# Patient Record
Sex: MALE | Race: WHITE | HISPANIC OR LATINO | Employment: FULL TIME | URBAN - METROPOLITAN AREA
[De-identification: names, ages, dates, MRNs, and addresses within clinical notes are randomized per-mention and may not be internally consistent; named-entity substitution may affect disease eponyms.]

---

## 2018-11-07 ENCOUNTER — NON-PROVIDER VISIT (OUTPATIENT)
Dept: URGENT CARE | Facility: PHYSICIAN GROUP | Age: 53
End: 2018-11-07

## 2018-11-07 DIAGNOSIS — Z02.1 PRE-EMPLOYMENT DRUG SCREENING: ICD-10-CM

## 2018-11-07 LAB
AMP AMPHETAMINE: NORMAL
COC COCAINE: NORMAL
INT CON NEG: NEGATIVE
INT CON POS: POSITIVE
MET METHAMPHETAMINES: NORMAL
OPI OPIATES: NORMAL
PCP PHENCYCLIDINE: NORMAL
POC DRUG COMMENT 753798-OCCUPATIONAL HEALTH: NEGATIVE
THC: NORMAL

## 2018-11-07 PROCEDURE — 80305 DRUG TEST PRSMV DIR OPT OBS: CPT | Performed by: PHYSICIAN ASSISTANT

## 2020-03-09 ENCOUNTER — HOSPITAL ENCOUNTER (EMERGENCY)
Facility: MEDICAL CENTER | Age: 55
End: 2020-03-10
Attending: EMERGENCY MEDICINE
Payer: COMMERCIAL

## 2020-03-09 ENCOUNTER — APPOINTMENT (OUTPATIENT)
Dept: RADIOLOGY | Facility: IMAGING CENTER | Age: 55
End: 2020-03-09
Attending: FAMILY MEDICINE
Payer: COMMERCIAL

## 2020-03-09 ENCOUNTER — OFFICE VISIT (OUTPATIENT)
Dept: URGENT CARE | Facility: CLINIC | Age: 55
End: 2020-03-09
Payer: COMMERCIAL

## 2020-03-09 ENCOUNTER — HOSPITAL ENCOUNTER (OUTPATIENT)
Facility: MEDICAL CENTER | Age: 55
End: 2020-03-09
Attending: FAMILY MEDICINE
Payer: COMMERCIAL

## 2020-03-09 VITALS — OXYGEN SATURATION: 95 % | TEMPERATURE: 104 F | HEART RATE: 120 BPM | RESPIRATION RATE: 15 BRPM

## 2020-03-09 DIAGNOSIS — J18.9 ATYPICAL PNEUMONIA: ICD-10-CM

## 2020-03-09 DIAGNOSIS — R50.9 FEVER AND CHILLS: ICD-10-CM

## 2020-03-09 DIAGNOSIS — E86.0 DEHYDRATION: ICD-10-CM

## 2020-03-09 LAB
APPEARANCE UR: CLEAR
BILIRUB UR STRIP-MCNC: NORMAL MG/DL
COLOR UR AUTO: YELLOW
FLUAV+FLUBV AG SPEC QL IA: NEGATIVE
GLUCOSE UR STRIP.AUTO-MCNC: NORMAL MG/DL
INT CON NEG: NEGATIVE
INT CON NEG: NEGATIVE
INT CON POS: POSITIVE
INT CON POS: POSITIVE
KETONES UR STRIP.AUTO-MCNC: NORMAL MG/DL
LEUKOCYTE ESTERASE UR QL STRIP.AUTO: NORMAL
NITRITE UR QL STRIP.AUTO: NORMAL
PH UR STRIP.AUTO: 5.5 [PH] (ref 5–8)
PROT UR QL STRIP: NORMAL MG/DL
RBC UR QL AUTO: NORMAL
S PYO AG THROAT QL: NEGATIVE
SP GR UR STRIP.AUTO: 1.02
UROBILINOGEN UR STRIP-MCNC: 0.2 MG/DL

## 2020-03-09 PROCEDURE — 87502 INFLUENZA DNA AMP PROBE: CPT

## 2020-03-09 PROCEDURE — 81002 URINALYSIS NONAUTO W/O SCOPE: CPT | Performed by: FAMILY MEDICINE

## 2020-03-09 PROCEDURE — 96374 THER/PROPH/DIAG INJ IV PUSH: CPT

## 2020-03-09 PROCEDURE — 87651 STREP A DNA AMP PROBE: CPT

## 2020-03-09 PROCEDURE — 87486 CHLMYD PNEUM DNA AMP PROBE: CPT

## 2020-03-09 PROCEDURE — 87581 M.PNEUMON DNA AMP PROBE: CPT

## 2020-03-09 PROCEDURE — 87633 RESP VIRUS 12-25 TARGETS: CPT

## 2020-03-09 PROCEDURE — 87804 INFLUENZA ASSAY W/OPTIC: CPT | Performed by: FAMILY MEDICINE

## 2020-03-09 PROCEDURE — 99205 OFFICE O/P NEW HI 60 MIN: CPT | Performed by: FAMILY MEDICINE

## 2020-03-09 PROCEDURE — 87086 URINE CULTURE/COLONY COUNT: CPT

## 2020-03-09 PROCEDURE — 99285 EMERGENCY DEPT VISIT HI MDM: CPT

## 2020-03-09 PROCEDURE — 87880 STREP A ASSAY W/OPTIC: CPT | Performed by: FAMILY MEDICINE

## 2020-03-09 PROCEDURE — 71046 X-RAY EXAM CHEST 2 VIEWS: CPT | Mod: TC | Performed by: FAMILY MEDICINE

## 2020-03-10 ENCOUNTER — HOSPITAL ENCOUNTER (INPATIENT)
Facility: MEDICAL CENTER | Age: 55
LOS: 7 days | DRG: 871 | End: 2020-03-17
Attending: EMERGENCY MEDICINE | Admitting: HOSPITALIST
Payer: COMMERCIAL

## 2020-03-10 ENCOUNTER — APPOINTMENT (OUTPATIENT)
Dept: RADIOLOGY | Facility: MEDICAL CENTER | Age: 55
DRG: 871 | End: 2020-03-10
Attending: EMERGENCY MEDICINE
Payer: COMMERCIAL

## 2020-03-10 ENCOUNTER — APPOINTMENT (OUTPATIENT)
Dept: CARDIOLOGY | Facility: MEDICAL CENTER | Age: 55
DRG: 871 | End: 2020-03-10
Attending: HOSPITALIST
Payer: COMMERCIAL

## 2020-03-10 VITALS
OXYGEN SATURATION: 95 % | WEIGHT: 203 LBS | HEIGHT: 69 IN | SYSTOLIC BLOOD PRESSURE: 132 MMHG | TEMPERATURE: 100.4 F | RESPIRATION RATE: 24 BRPM | HEART RATE: 107 BPM | DIASTOLIC BLOOD PRESSURE: 61 MMHG | BODY MASS INDEX: 30.07 KG/M2

## 2020-03-10 DIAGNOSIS — R50.9 FEVER AND CHILLS: ICD-10-CM

## 2020-03-10 DIAGNOSIS — I35.0 NONRHEUMATIC AORTIC VALVE STENOSIS: ICD-10-CM

## 2020-03-10 DIAGNOSIS — R78.81 BACTEREMIA: ICD-10-CM

## 2020-03-10 DIAGNOSIS — A41.9 SEPSIS WITHOUT ACUTE ORGAN DYSFUNCTION, DUE TO UNSPECIFIED ORGANISM (HCC): ICD-10-CM

## 2020-03-10 PROBLEM — I10 HYPERTENSION: Status: ACTIVE | Noted: 2020-03-10

## 2020-03-10 PROBLEM — K02.9 DENTAL CARIES: Status: ACTIVE | Noted: 2020-03-10

## 2020-03-10 PROBLEM — M10.9 GOUT: Status: ACTIVE | Noted: 2020-03-10

## 2020-03-10 PROBLEM — I33.0 ACUTE BACTERIAL ENDOCARDITIS: Status: ACTIVE | Noted: 2020-03-10

## 2020-03-10 LAB
ALBUMIN SERPL BCP-MCNC: 3.7 G/DL (ref 3.2–4.9)
ALBUMIN SERPL BCP-MCNC: 3.9 G/DL (ref 3.2–4.9)
ALBUMIN/GLOB SERPL: 1 G/DL
ALBUMIN/GLOB SERPL: 1.1 G/DL
ALP SERPL-CCNC: 126 U/L (ref 30–99)
ALP SERPL-CCNC: 131 U/L (ref 30–99)
ALT SERPL-CCNC: 43 U/L (ref 2–50)
ALT SERPL-CCNC: 50 U/L (ref 2–50)
ANION GAP SERPL CALC-SCNC: 9 MMOL/L (ref 0–11.9)
ANION GAP SERPL CALC-SCNC: 9 MMOL/L (ref 0–11.9)
APPEARANCE UR: CLEAR
APPEARANCE UR: CLEAR
AST SERPL-CCNC: 38 U/L (ref 12–45)
AST SERPL-CCNC: 49 U/L (ref 12–45)
BASOPHILS # BLD AUTO: 0.4 % (ref 0–1.8)
BASOPHILS # BLD AUTO: 0.5 % (ref 0–1.8)
BASOPHILS # BLD: 0.05 K/UL (ref 0–0.12)
BASOPHILS # BLD: 0.06 K/UL (ref 0–0.12)
BILIRUB SERPL-MCNC: 0.5 MG/DL (ref 0.1–1.5)
BILIRUB SERPL-MCNC: 0.5 MG/DL (ref 0.1–1.5)
BILIRUB UR QL STRIP.AUTO: NEGATIVE
BILIRUB UR QL STRIP.AUTO: NEGATIVE
BUN SERPL-MCNC: 15 MG/DL (ref 8–22)
BUN SERPL-MCNC: 16 MG/DL (ref 8–22)
C PNEUM DNA SPEC QL NAA+PROBE: NOT DETECTED
CALCIUM SERPL-MCNC: 9.3 MG/DL (ref 8.5–10.5)
CALCIUM SERPL-MCNC: 9.5 MG/DL (ref 8.5–10.5)
CHLORIDE SERPL-SCNC: 107 MMOL/L (ref 96–112)
CHLORIDE SERPL-SCNC: 107 MMOL/L (ref 96–112)
CO2 SERPL-SCNC: 20 MMOL/L (ref 20–33)
CO2 SERPL-SCNC: 21 MMOL/L (ref 20–33)
COLOR UR: YELLOW
COLOR UR: YELLOW
CREAT SERPL-MCNC: 0.83 MG/DL (ref 0.5–1.4)
CREAT SERPL-MCNC: 0.84 MG/DL (ref 0.5–1.4)
EKG IMPRESSION: NORMAL
EOSINOPHIL # BLD AUTO: 0.14 K/UL (ref 0–0.51)
EOSINOPHIL # BLD AUTO: 0.25 K/UL (ref 0–0.51)
EOSINOPHIL NFR BLD: 1.2 % (ref 0–6.9)
EOSINOPHIL NFR BLD: 2.2 % (ref 0–6.9)
ERYTHROCYTE [DISTWIDTH] IN BLOOD BY AUTOMATED COUNT: 40.8 FL (ref 35.9–50)
ERYTHROCYTE [DISTWIDTH] IN BLOOD BY AUTOMATED COUNT: 42 FL (ref 35.9–50)
FLUAV H1 2009 PAND RNA SPEC QL NAA+PROBE: NOT DETECTED
FLUAV H1 RNA SPEC QL NAA+PROBE: NOT DETECTED
FLUAV H3 RNA SPEC QL NAA+PROBE: NOT DETECTED
FLUAV RNA SPEC QL NAA+PROBE: NEGATIVE
FLUAV RNA SPEC QL NAA+PROBE: NOT DETECTED
FLUBV RNA SPEC QL NAA+PROBE: NEGATIVE
FLUBV RNA SPEC QL NAA+PROBE: NOT DETECTED
GLOBULIN SER CALC-MCNC: 3.5 G/DL (ref 1.9–3.5)
GLOBULIN SER CALC-MCNC: 3.6 G/DL (ref 1.9–3.5)
GLUCOSE SERPL-MCNC: 116 MG/DL (ref 65–99)
GLUCOSE SERPL-MCNC: 117 MG/DL (ref 65–99)
GLUCOSE UR STRIP.AUTO-MCNC: NEGATIVE MG/DL
GLUCOSE UR STRIP.AUTO-MCNC: NEGATIVE MG/DL
HADV DNA SPEC QL NAA+PROBE: NOT DETECTED
HCOV RNA SPEC QL NAA+PROBE: NOT DETECTED
HCT VFR BLD AUTO: 35 % (ref 42–52)
HCT VFR BLD AUTO: 36.3 % (ref 42–52)
HGB BLD-MCNC: 12.3 G/DL (ref 14–18)
HGB BLD-MCNC: 12.6 G/DL (ref 14–18)
HMPV RNA SPEC QL NAA+PROBE: NOT DETECTED
HPIV1 RNA SPEC QL NAA+PROBE: NOT DETECTED
HPIV2 RNA SPEC QL NAA+PROBE: NOT DETECTED
HPIV3 RNA SPEC QL NAA+PROBE: NOT DETECTED
HPIV4 RNA SPEC QL NAA+PROBE: NOT DETECTED
IMM GRANULOCYTES # BLD AUTO: 0.04 K/UL (ref 0–0.11)
IMM GRANULOCYTES # BLD AUTO: 0.04 K/UL (ref 0–0.11)
IMM GRANULOCYTES NFR BLD AUTO: 0.3 % (ref 0–0.9)
IMM GRANULOCYTES NFR BLD AUTO: 0.3 % (ref 0–0.9)
INR PPP: 1.13 (ref 0.87–1.13)
KETONES UR STRIP.AUTO-MCNC: NEGATIVE MG/DL
KETONES UR STRIP.AUTO-MCNC: NEGATIVE MG/DL
LACTATE BLD-SCNC: 0.8 MMOL/L (ref 0.5–2)
LACTATE BLD-SCNC: 1.4 MMOL/L (ref 0.5–2)
LEUKOCYTE ESTERASE UR QL STRIP.AUTO: NEGATIVE
LEUKOCYTE ESTERASE UR QL STRIP.AUTO: NEGATIVE
LV EJECT FRACT  99904: 70
LV EJECT FRACT MOD 2C 99903: 63.67
LV EJECT FRACT MOD 4C 99902: 76.87
LV EJECT FRACT MOD BP 99901: 70.56
LYMPHOCYTES # BLD AUTO: 1.66 K/UL (ref 1–4.8)
LYMPHOCYTES # BLD AUTO: 1.71 K/UL (ref 1–4.8)
LYMPHOCYTES NFR BLD: 14.4 % (ref 22–41)
LYMPHOCYTES NFR BLD: 14.8 % (ref 22–41)
M PNEUMO DNA SPEC QL NAA+PROBE: NOT DETECTED
MAGNESIUM SERPL-MCNC: 1.8 MG/DL (ref 1.5–2.5)
MCH RBC QN AUTO: 32.4 PG (ref 27–33)
MCH RBC QN AUTO: 32.8 PG (ref 27–33)
MCHC RBC AUTO-ENTMCNC: 34.7 G/DL (ref 33.7–35.3)
MCHC RBC AUTO-ENTMCNC: 35.1 G/DL (ref 33.7–35.3)
MCV RBC AUTO: 92.1 FL (ref 81.4–97.8)
MCV RBC AUTO: 94.5 FL (ref 81.4–97.8)
MICRO URNS: NORMAL
MICRO URNS: NORMAL
MONOCYTES # BLD AUTO: 1.17 K/UL (ref 0–0.85)
MONOCYTES # BLD AUTO: 1.22 K/UL (ref 0–0.85)
MONOCYTES NFR BLD AUTO: 10.1 % (ref 0–13.4)
MONOCYTES NFR BLD AUTO: 10.6 % (ref 0–13.4)
NEUTROPHILS # BLD AUTO: 8.32 K/UL (ref 1.82–7.42)
NEUTROPHILS # BLD AUTO: 8.46 K/UL (ref 1.82–7.42)
NEUTROPHILS NFR BLD: 72 % (ref 44–72)
NEUTROPHILS NFR BLD: 73.2 % (ref 44–72)
NITRITE UR QL STRIP.AUTO: NEGATIVE
NITRITE UR QL STRIP.AUTO: NEGATIVE
NRBC # BLD AUTO: 0 K/UL
NRBC # BLD AUTO: 0 K/UL
NRBC BLD-RTO: 0 /100 WBC
NRBC BLD-RTO: 0 /100 WBC
PH UR STRIP.AUTO: 5 [PH] (ref 5–8)
PH UR STRIP.AUTO: 7 [PH] (ref 5–8)
PLATELET # BLD AUTO: 234 K/UL (ref 164–446)
PLATELET # BLD AUTO: 246 K/UL (ref 164–446)
PMV BLD AUTO: 9.3 FL (ref 9–12.9)
PMV BLD AUTO: 9.4 FL (ref 9–12.9)
POTASSIUM SERPL-SCNC: 4.2 MMOL/L (ref 3.6–5.5)
POTASSIUM SERPL-SCNC: 4.4 MMOL/L (ref 3.6–5.5)
PROT SERPL-MCNC: 7.3 G/DL (ref 6–8.2)
PROT SERPL-MCNC: 7.4 G/DL (ref 6–8.2)
PROT UR QL STRIP: NEGATIVE MG/DL
PROT UR QL STRIP: NEGATIVE MG/DL
PROTHROMBIN TIME: 14.7 SEC (ref 12–14.6)
RBC # BLD AUTO: 3.8 M/UL (ref 4.7–6.1)
RBC # BLD AUTO: 3.84 M/UL (ref 4.7–6.1)
RBC UR QL AUTO: NEGATIVE
RBC UR QL AUTO: NEGATIVE
RSV A RNA SPEC QL NAA+PROBE: NOT DETECTED
RSV B RNA SPEC QL NAA+PROBE: NOT DETECTED
RV+EV RNA SPEC QL NAA+PROBE: NOT DETECTED
S PYO DNA SPEC NAA+PROBE: NOT DETECTED
SODIUM SERPL-SCNC: 136 MMOL/L (ref 135–145)
SODIUM SERPL-SCNC: 137 MMOL/L (ref 135–145)
SP GR UR STRIP.AUTO: 1.01
SP GR UR STRIP.AUTO: 1.01
TROPONIN T SERPL-MCNC: 16 NG/L (ref 6–19)
UROBILINOGEN UR STRIP.AUTO-MCNC: 0.2 MG/DL
UROBILINOGEN UR STRIP.AUTO-MCNC: 1 MG/DL
WBC # BLD AUTO: 11.6 K/UL (ref 4.8–10.8)
WBC # BLD AUTO: 11.6 K/UL (ref 4.8–10.8)

## 2020-03-10 PROCEDURE — 85025 COMPLETE CBC W/AUTO DIFF WBC: CPT

## 2020-03-10 PROCEDURE — 700111 HCHG RX REV CODE 636 W/ 250 OVERRIDE (IP): Performed by: EMERGENCY MEDICINE

## 2020-03-10 PROCEDURE — 93005 ELECTROCARDIOGRAM TRACING: CPT

## 2020-03-10 PROCEDURE — 84484 ASSAY OF TROPONIN QUANT: CPT

## 2020-03-10 PROCEDURE — 93005 ELECTROCARDIOGRAM TRACING: CPT | Performed by: EMERGENCY MEDICINE

## 2020-03-10 PROCEDURE — 700102 HCHG RX REV CODE 250 W/ 637 OVERRIDE(OP): Performed by: EMERGENCY MEDICINE

## 2020-03-10 PROCEDURE — 93306 TTE W/DOPPLER COMPLETE: CPT | Mod: 26 | Performed by: INTERNAL MEDICINE

## 2020-03-10 PROCEDURE — 700105 HCHG RX REV CODE 258: Performed by: EMERGENCY MEDICINE

## 2020-03-10 PROCEDURE — 87040 BLOOD CULTURE FOR BACTERIA: CPT | Mod: 91

## 2020-03-10 PROCEDURE — 80053 COMPREHEN METABOLIC PANEL: CPT

## 2020-03-10 PROCEDURE — 81003 URINALYSIS AUTO W/O SCOPE: CPT

## 2020-03-10 PROCEDURE — 87181 SC STD AGAR DILUTION PER AGT: CPT

## 2020-03-10 PROCEDURE — 85610 PROTHROMBIN TIME: CPT

## 2020-03-10 PROCEDURE — 80053 COMPREHEN METABOLIC PANEL: CPT | Mod: 91

## 2020-03-10 PROCEDURE — 87040 BLOOD CULTURE FOR BACTERIA: CPT

## 2020-03-10 PROCEDURE — 81003 URINALYSIS AUTO W/O SCOPE: CPT | Mod: 91

## 2020-03-10 PROCEDURE — 770020 HCHG ROOM/CARE - TELE (206)

## 2020-03-10 PROCEDURE — 83605 ASSAY OF LACTIC ACID: CPT

## 2020-03-10 PROCEDURE — 83605 ASSAY OF LACTIC ACID: CPT | Mod: 91

## 2020-03-10 PROCEDURE — 83735 ASSAY OF MAGNESIUM: CPT

## 2020-03-10 PROCEDURE — 93306 TTE W/DOPPLER COMPLETE: CPT

## 2020-03-10 PROCEDURE — 85025 COMPLETE CBC W/AUTO DIFF WBC: CPT | Mod: 91

## 2020-03-10 PROCEDURE — A9270 NON-COVERED ITEM OR SERVICE: HCPCS | Performed by: EMERGENCY MEDICINE

## 2020-03-10 PROCEDURE — 71045 X-RAY EXAM CHEST 1 VIEW: CPT

## 2020-03-10 PROCEDURE — 99223 1ST HOSP IP/OBS HIGH 75: CPT | Performed by: HOSPITALIST

## 2020-03-10 PROCEDURE — 87077 CULTURE AEROBIC IDENTIFY: CPT

## 2020-03-10 PROCEDURE — 87086 URINE CULTURE/COLONY COUNT: CPT

## 2020-03-10 PROCEDURE — 96365 THER/PROPH/DIAG IV INF INIT: CPT

## 2020-03-10 PROCEDURE — 99285 EMERGENCY DEPT VISIT HI MDM: CPT

## 2020-03-10 RX ORDER — POLYETHYLENE GLYCOL 3350 17 G/17G
1 POWDER, FOR SOLUTION ORAL
Status: DISCONTINUED | OUTPATIENT
Start: 2020-03-10 | End: 2020-03-17 | Stop reason: HOSPADM

## 2020-03-10 RX ORDER — LISINOPRIL 10 MG/1
10 TABLET ORAL
Status: DISCONTINUED | OUTPATIENT
Start: 2020-03-11 | End: 2020-03-15

## 2020-03-10 RX ORDER — PROMETHAZINE HYDROCHLORIDE 25 MG/1
12.5-25 SUPPOSITORY RECTAL EVERY 4 HOURS PRN
Status: DISCONTINUED | OUTPATIENT
Start: 2020-03-10 | End: 2020-03-17 | Stop reason: HOSPADM

## 2020-03-10 RX ORDER — SODIUM CHLORIDE, SODIUM LACTATE, POTASSIUM CHLORIDE, AND CALCIUM CHLORIDE .6; .31; .03; .02 G/100ML; G/100ML; G/100ML; G/100ML
30 INJECTION, SOLUTION INTRAVENOUS
Status: DISCONTINUED | OUTPATIENT
Start: 2020-03-10 | End: 2020-03-17 | Stop reason: HOSPADM

## 2020-03-10 RX ORDER — OXYCODONE HYDROCHLORIDE 5 MG/1
5 TABLET ORAL
Status: DISCONTINUED | OUTPATIENT
Start: 2020-03-10 | End: 2020-03-13

## 2020-03-10 RX ORDER — PROCHLORPERAZINE EDISYLATE 5 MG/ML
5-10 INJECTION INTRAMUSCULAR; INTRAVENOUS EVERY 4 HOURS PRN
Status: DISCONTINUED | OUTPATIENT
Start: 2020-03-10 | End: 2020-03-17 | Stop reason: HOSPADM

## 2020-03-10 RX ORDER — OXYCODONE HYDROCHLORIDE 10 MG/1
10 TABLET ORAL
Status: DISCONTINUED | OUTPATIENT
Start: 2020-03-10 | End: 2020-03-13

## 2020-03-10 RX ORDER — ACETAMINOPHEN 500 MG
1000 TABLET ORAL EVERY 6 HOURS PRN
Status: DISCONTINUED | OUTPATIENT
Start: 2020-03-10 | End: 2020-03-17 | Stop reason: HOSPADM

## 2020-03-10 RX ORDER — AZITHROMYCIN 500 MG/1
250 TABLET, FILM COATED ORAL DAILY
Qty: 4 TAB | Refills: 0 | Status: ON HOLD | OUTPATIENT
Start: 2020-03-10 | End: 2020-03-16

## 2020-03-10 RX ORDER — ONDANSETRON 4 MG/1
4 TABLET, ORALLY DISINTEGRATING ORAL EVERY 4 HOURS PRN
Status: DISCONTINUED | OUTPATIENT
Start: 2020-03-10 | End: 2020-03-17 | Stop reason: HOSPADM

## 2020-03-10 RX ORDER — ONDANSETRON 2 MG/ML
4 INJECTION INTRAMUSCULAR; INTRAVENOUS EVERY 4 HOURS PRN
Status: DISCONTINUED | OUTPATIENT
Start: 2020-03-10 | End: 2020-03-17 | Stop reason: HOSPADM

## 2020-03-10 RX ORDER — AMOXICILLIN 250 MG
2 CAPSULE ORAL 2 TIMES DAILY
Status: DISCONTINUED | OUTPATIENT
Start: 2020-03-10 | End: 2020-03-17 | Stop reason: HOSPADM

## 2020-03-10 RX ORDER — SODIUM CHLORIDE, SODIUM LACTATE, POTASSIUM CHLORIDE, CALCIUM CHLORIDE 600; 310; 30; 20 MG/100ML; MG/100ML; MG/100ML; MG/100ML
INJECTION, SOLUTION INTRAVENOUS CONTINUOUS
Status: DISCONTINUED | OUTPATIENT
Start: 2020-03-10 | End: 2020-03-13

## 2020-03-10 RX ORDER — SODIUM CHLORIDE 9 MG/ML
1000 INJECTION, SOLUTION INTRAVENOUS ONCE
Status: COMPLETED | OUTPATIENT
Start: 2020-03-10 | End: 2020-03-10

## 2020-03-10 RX ORDER — LISINOPRIL 10 MG/1
10 TABLET ORAL
Status: ON HOLD | COMMUNITY
Start: 2020-01-22 | End: 2020-03-16

## 2020-03-10 RX ORDER — ALLOPURINOL 100 MG/1
200 TABLET ORAL 2 TIMES DAILY
COMMUNITY

## 2020-03-10 RX ORDER — HYDROMORPHONE HYDROCHLORIDE 1 MG/ML
0.5 INJECTION, SOLUTION INTRAMUSCULAR; INTRAVENOUS; SUBCUTANEOUS
Status: DISCONTINUED | OUTPATIENT
Start: 2020-03-10 | End: 2020-03-10 | Stop reason: HOSPADM

## 2020-03-10 RX ORDER — CHLORAL HYDRATE 500 MG
1000 CAPSULE ORAL DAILY
Status: ON HOLD | COMMUNITY
End: 2020-05-07

## 2020-03-10 RX ORDER — SODIUM CHLORIDE, SODIUM LACTATE, POTASSIUM CHLORIDE, AND CALCIUM CHLORIDE .6; .31; .03; .02 G/100ML; G/100ML; G/100ML; G/100ML
1000 INJECTION, SOLUTION INTRAVENOUS
Status: DISCONTINUED | OUTPATIENT
Start: 2020-03-10 | End: 2020-03-17 | Stop reason: HOSPADM

## 2020-03-10 RX ORDER — PROMETHAZINE HYDROCHLORIDE 25 MG/1
12.5-25 TABLET ORAL EVERY 4 HOURS PRN
Status: DISCONTINUED | OUTPATIENT
Start: 2020-03-10 | End: 2020-03-17 | Stop reason: HOSPADM

## 2020-03-10 RX ORDER — BISACODYL 10 MG
10 SUPPOSITORY, RECTAL RECTAL
Status: DISCONTINUED | OUTPATIENT
Start: 2020-03-10 | End: 2020-03-17 | Stop reason: HOSPADM

## 2020-03-10 RX ORDER — ALLOPURINOL 100 MG/1
200 TABLET ORAL 2 TIMES DAILY
Status: DISCONTINUED | OUTPATIENT
Start: 2020-03-10 | End: 2020-03-17 | Stop reason: HOSPADM

## 2020-03-10 RX ORDER — AZITHROMYCIN 250 MG/1
500 TABLET, FILM COATED ORAL ONCE
Status: COMPLETED | OUTPATIENT
Start: 2020-03-10 | End: 2020-03-10

## 2020-03-10 RX ORDER — HYDROMORPHONE HYDROCHLORIDE 1 MG/ML
0.5 INJECTION, SOLUTION INTRAMUSCULAR; INTRAVENOUS; SUBCUTANEOUS
Status: DISCONTINUED | OUTPATIENT
Start: 2020-03-10 | End: 2020-03-13

## 2020-03-10 RX ORDER — IBUPROFEN 200 MG
800 TABLET ORAL
Status: ON HOLD | COMMUNITY
End: 2020-05-14

## 2020-03-10 RX ADMIN — AZITHROMYCIN MONOHYDRATE 500 MG: 250 TABLET ORAL at 03:09

## 2020-03-10 RX ADMIN — SODIUM CHLORIDE 1000 ML: 9 INJECTION, SOLUTION INTRAVENOUS at 00:59

## 2020-03-10 RX ADMIN — HYDROMORPHONE HYDROCHLORIDE 0.5 MG: 1 INJECTION, SOLUTION INTRAMUSCULAR; INTRAVENOUS; SUBCUTANEOUS at 00:59

## 2020-03-10 RX ADMIN — CEFTRIAXONE SODIUM 2 G: 2 INJECTION, POWDER, FOR SOLUTION INTRAMUSCULAR; INTRAVENOUS at 19:18

## 2020-03-10 SDOH — HEALTH STABILITY: MENTAL HEALTH: HOW OFTEN DO YOU HAVE A DRINK CONTAINING ALCOHOL?: NEVER

## 2020-03-10 ASSESSMENT — ENCOUNTER SYMPTOMS
MYALGIAS: 0
BRUISES/BLEEDS EASILY: 0
CHILLS: 1
NAUSEA: 0
DEPRESSION: 0
COUGH: 0
DIZZINESS: 0
NERVOUS/ANXIOUS: 0
TINGLING: 0
GASTROINTESTINAL NEGATIVE: 1
WEAKNESS: 0
SENSORY CHANGE: 0
SHORTNESS OF BREATH: 0
RESPIRATORY NEGATIVE: 1
CARDIOVASCULAR NEGATIVE: 1
WEIGHT LOSS: 0
PSYCHIATRIC NEGATIVE: 1
VOMITING: 0
LOSS OF CONSCIOUSNESS: 0
FEVER: 1
BACK PAIN: 1
FLANK PAIN: 0
ABDOMINAL PAIN: 0
NEUROLOGICAL NEGATIVE: 1

## 2020-03-10 ASSESSMENT — PATIENT HEALTH QUESTIONNAIRE - PHQ9
1. LITTLE INTEREST OR PLEASURE IN DOING THINGS: NOT AT ALL
2. FEELING DOWN, DEPRESSED, IRRITABLE, OR HOPELESS: NOT AT ALL
SUM OF ALL RESPONSES TO PHQ9 QUESTIONS 1 AND 2: 0

## 2020-03-10 ASSESSMENT — PAIN DESCRIPTION - DESCRIPTORS: DESCRIPTORS: PRESSURE

## 2020-03-10 ASSESSMENT — FIBROSIS 4 INDEX
FIB4 SCORE: 1.31
FIB4 SCORE: 1.49

## 2020-03-10 NOTE — ED NOTES
Pt resting in bed.  Pt appears comfortable in room.  No visible signs or symptoms of distress noted at this time.  Pt denies needs or concerns at this time.   Rounding ongoing.   PT given a hospital phone to call his wife. Pt does not have cell reception in room

## 2020-03-10 NOTE — ED TRIAGE NOTES
"Chief Complaint   Patient presents with   • Flu Like Symptoms    /79   Pulse (!) 121   Temp (!) 39.4 °C (103 °F) (Oral)   Resp (!) 24   Ht 1.753 m (5' 9\")   Wt 92.1 kg (203 lb)   SpO2 92%     Pt is a transfer from urgent care. Pt p[resents w flu -like symptoms. Pt had recent travel to [facility]. Pt anxious and aggravated in triage. Pt alert and oriented.    "

## 2020-03-10 NOTE — DISCHARGE INSTRUCTIONS
Please go to the CDC website regarding the COVID19 virus home isolation protocol.  Return if your symptoms worsen or change in any way.

## 2020-03-10 NOTE — ED NOTES
Assist RN: Spoke with pt's wife in lobby regarding no visitor policy per [facility] and ER Manager.  Provided with department contact information.

## 2020-03-10 NOTE — PROGRESS NOTES
CC:  cough        ** 2/21-23 - was in [place], [place]         he c/o cough, intermittent fevers and chills, joint pains, myalgias, fatigue and slight cough x 4 wks.   States that the joint and muscle pains are better with motrin.   He also feels that he has been voiding more frequently over past couple of weeks      Pertinent negatives include no   headaches, nausea, vomiting, diarrhea,  , weight loss or wheezing. Nothing aggravates the symptoms.   There is no history of asthma.        Past Medical History:   Diagnosis Date   • Hyperlipidemia    • Hypertension      Family history was reviewed and not pertinent           Social History     Tobacco Use   • Smoking status: Never Smoker   Substance Use Topics   • Alcohol use: Not on file   • Drug use: Not on file         Current Outpatient Medications on File Prior to Visit   Medication Sig Dispense Refill   • diphenoxylate-atropine (LOMOTIL) 2.5-0.025 MG TABS Take 2 Tabs by mouth 4 times a day as needed for Diarrhea. 30 Tab 0   • ranitidine (ZANTAC) 300 MG tablet Take 1 Tab by mouth every day. 30 Each 0   • promethazine-codeine (PHENERGAN-CODEINE) 6.25-10 MG/5ML SYRP Take 5 mL by mouth 3 times a day as needed for Cough (take with food). 120 mL 0     No current facility-administered medications on file prior to visit.                 Review of Systems   Constitutional: + for fever, chills and malaise/fatigue.   Eyes: Negative for vision changes, d/c.    Respiratory: + for cough .   Denies sputum production.    Cardiovascular: Negative for chest pain and palpitations.   Gastrointestinal: Negative for nausea, vomiting, abdominal pain, diarrhea and constipation.   Genitourinary: Negative for dysuria.   +  urgency and frequency.   Skin: Negative for rash or  itching.   Neurological: Negative for dizziness and tingling.    Psychiatric/Behavioral: Negative for depression.   Hematologic/lymphatic - denies bruising or excessive bleeding  All other systems reviewed and are  negative.             Objective:      Pulse (!) 120   Temp (!) 40 °C (104 °F)   Resp 15   SpO2 95%     Physical Exam   Constitutional: patient is oriented to person, place, and time. Patient appears well-developed and well-nourished. No distress.   HENT:   Head: Normocephalic and atraumatic.   Right Ear: External ear normal.   Left Ear: External ear normal.   Nose: Mucosal edema  present. Right sinus exhibits no maxillary sinus tenderness. Left sinus exhibits no maxillary sinus tenderness.   Mouth/Throat: Mucous membranes are normal. No oral lesions.  No posterior pharyngeal erythema.  No oropharyngeal exudate or posterior oropharyngeal edema.   Eyes: Conjunctivae and EOM are normal. Pupils are equal, round, and reactive to light. Right eye exhibits no discharge. Left eye exhibits no discharge. No scleral icterus.   Neck: Normal range of motion. Neck supple. No tracheal deviation present.   Cardiovascular: Normal rate, regular rhythm and normal heart sounds.  Exam reveals no friction rub.    Pulmonary/Chest: Effort normal. No respiratory distress. Patient has no wheezes or rhonchi. Patient has no rales.    Musculoskeletal:  exhibits no edema.   Lymphadenopathy:     Patient has no cervical adenopathy.      Neurological: patient is alert and oriented to person, place, and time.   Skin: Skin is warm and dry. No rash noted. No erythema.   Psychiatric: patient  has a normal mood and affect.  behavior is normal.   Nursing note and vitals reviewed.         Reading Physician Reading Date Result Priority   Jackie Richardson M.D.  [phone] 3/9/2020 Urgent Care      Narrative & Impression       3/9/2020 10:57 PM     HISTORY/REASON FOR EXAM: COUGH; Cough     TECHNIQUE/EXAM DESCRIPTION:  PA and lateral views of the chest.     COMPARISON: None     FINDINGS:     The cardiac silhouette appears within normal limits.     The mediastinal contour appears within normal limits.  The central pulmonary vasculature appears  normal.     The lungs appear well expanded bilaterally.  Bilateral lungs are clear.     No significant pleural effusions are identified.     The bony structures appear age-appropriate.     IMPRESSION:        1.  No acute cardiopulmonary disease.     No results found for: POCCOLOR, POCAPPEAR, POCLEUKEST, POCNITRITE, POCUROBILIGE, POCPROTEIN, POCURPH, POCBLOOD, POCSPGRV, POCKETONES, POCBILIRUBIN, POCGLUCUA      Chest x-ray was personally interpreted and reviewed. No acute cardiopulmonary findings. No pulmonary infiltrates or densities. Cardiac silhouette is normal. No hemidiaphragm elevation. No bony abnormalities.    Assessment/Plan:        1. Fever and chills    cxr was unremarkable.     Rapid strep, influenza were negative.         UA was unremarkable and was also sent for culture      Temp 104, temporally and was given 500mg tylenol    He will require higher level of care.  Differential is broad at this point.    will go to ED via private vehicle.     Currently PUI for COVID-19 - reported to [facility]    Report given to ERP at [facility]    - DX-CHEST-2 VIEWS; Future  - Influenza A/B By PCR (Adult - Flu Only); Future  - Group A Strep by PCR; Future  - POCT Rapid Strep A  - POCT Influenza A/B  - POCT Urinalysis  - URINE CULTURE(NEW); Future

## 2020-03-10 NOTE — ED NOTES
Break RN: antibiotic given. Patient discharged with prescription and instruction. [facility] will contact him tomorrow/ [facility] phone number provided. Mask applied prior to leave the room.

## 2020-03-10 NOTE — ED NOTES
Patient given a urinal to obtain a Urine Specimen. Pt was given instruction on proper collection- Pt verbalized understanding.   UA collected and sent to lab per protocol

## 2020-03-10 NOTE — ED PROVIDER NOTES
ED Provider Note    CHIEF COMPLAINT  Chief Complaint   Patient presents with   • Flu Like Symptoms       HPI  Sanjeev Malagon is a 54 y.o. male who presents for evaluation of fever which has been present for several weeks and associated with waxing and waning body aches, cough which has been dry and nonproductive, but no shortness of breath or difficulty breathing.  Patient notes that he has had intermittent flank pain as well and urinary frequency with no dysuria or hematuria.  He notes he has been taking anywhere from 9 to 12 tablets of 200 mg Motrin per day for at least the past week to control the symptoms.  He has not had any hemoptysis, sore throat, or runny nose.  Patient notes he has been having diffuse muscle and joint aches and also notes that he has gout.  He has no active flareups at this time however.    REVIEW OF SYSTEMS  Constitutional: Fevers, chills, fatigue  Skin: No rashes abrasions, lacerations, or pruritus  HEENT: No ear pain, ringing in ears, or decreased hearing. No sore throat, runny nose, sores, trouble swallowing, trouble speaking.  Neck: No neck pain, stiffness, or masses.  Chest: No pain or rashes  Pulm: No shortness of breath, wheezing, stridor, or pain with inspiration/expiration  Gastrointestinal: No nausea, vomiting, diarrhea, constipation, bloating, melena, hematochezia or pain.  Genitourinary: No dysuria or hematuria  Musculoskeletal: No recent trauma,swelling, weakness.  Diffuse muscle and joint aches.    Neurologic: No sensory or motor changes. No confusion or disorientation.  Heme: No bleeding or bruising problems.   Immuno: No hx of recurrent infections      PAST MEDICAL HISTORY   has a past medical history of Hyperlipidemia and Hypertension.    SOCIAL HISTORY  Social History     Tobacco Use   • Smoking status: Never Smoker   • Smokeless tobacco: Never Used   Substance and Sexual Activity   • Alcohol use: Never     Frequency: Never   • Drug use: Never   • Sexual activity: Not on  "file       SURGICAL HISTORY  patient denies any surgical history    CURRENT MEDICATIONS  Home Medications     Reviewed by Keny Reynolds R.N. (Registered Nurse) on 03/10/20 at 0001  Med List Status: Partial   Medication Last Dose Status   diphenoxylate-atropine (LOMOTIL) 2.5-0.025 MG TABS  Active   promethazine-codeine (PHENERGAN-CODEINE) 6.25-10 MG/5ML SYRP  Active   ranitidine (ZANTAC) 300 MG tablet  Active                ALLERGIES  No Known Allergies    PHYSICAL EXAM  VITAL SIGNS: /79   Pulse (!) 121   Temp (!) 39.4 °C (103 °F) (Oral)   Resp (!) 24   Ht 1.753 m (5' 9\")   Wt 92.1 kg (203 lb)   SpO2 92%   BMI 29.98 kg/m²    Gen: Alert in no apparent distress.  Calm, conversant  HEENT: No signs of trauma, Bilateral external ears normal, Nose normal. Conjunctiva normal, Non-icteric.   Neck:  No tenderness, Supple, No masses  Lymphatic: No cervical lymphadenopathy noted.   Cardiovascular: Mildly tachycardic, regular rhythm.  Prominent systolic murmur.  Capillary refill less than 3 seconds all extremities, 2+ distal pulses  Thorax & Lungs: Normal breath sounds, No respiratory distress, No wheezing bilateral chest rise  Abdomen: Bowel sounds normal, Soft, No tenderness, No masses, No pulsatile masses. No Guarding or rebound  Skin: Warm, Dry, No erythema, No rash.   Extremities: Intact distal pulses, No edema  Neurologic: Alert , no facial droop, grossly normal coordination and strength  Psychiatric: Affect normal, Judgment normal, Mood normal.     LABS  Results for orders placed or performed during the hospital encounter of 03/09/20   CBC WITH DIFFERENTIAL   Result Value Ref Range    WBC 11.6 (H) 4.8 - 10.8 K/uL    RBC 3.84 (L) 4.70 - 6.10 M/uL    Hemoglobin 12.6 (L) 14.0 - 18.0 g/dL    Hematocrit 36.3 (L) 42.0 - 52.0 %    MCV 94.5 81.4 - 97.8 fL    MCH 32.8 27.0 - 33.0 pg    MCHC 34.7 33.7 - 35.3 g/dL    RDW 42.0 35.9 - 50.0 fL    Platelet Count 234 164 - 446 K/uL    MPV 9.4 9.0 - 12.9 fL    " Neutrophils-Polys 73.20 (H) 44.00 - 72.00 %    Lymphocytes 14.80 (L) 22.00 - 41.00 %    Monocytes 10.10 0.00 - 13.40 %    Eosinophils 1.20 0.00 - 6.90 %    Basophils 0.40 0.00 - 1.80 %    Immature Granulocytes 0.30 0.00 - 0.90 %    Nucleated RBC 0.00 /100 WBC    Neutrophils (Absolute) 8.46 (H) 1.82 - 7.42 K/uL    Lymphs (Absolute) 1.71 1.00 - 4.80 K/uL    Monos (Absolute) 1.17 (H) 0.00 - 0.85 K/uL    Eos (Absolute) 0.14 0.00 - 0.51 K/uL    Baso (Absolute) 0.05 0.00 - 0.12 K/uL    Immature Granulocytes (abs) 0.04 0.00 - 0.11 K/uL    NRBC (Absolute) 0.00 K/uL   COMP METABOLIC PANEL   Result Value Ref Range    Sodium 136 135 - 145 mmol/L    Potassium 4.2 3.6 - 5.5 mmol/L    Chloride 107 96 - 112 mmol/L    Co2 20 20 - 33 mmol/L    Anion Gap 9.0 0.0 - 11.9    Glucose 117 (H) 65 - 99 mg/dL    Bun 16 8 - 22 mg/dL    Creatinine 0.84 0.50 - 1.40 mg/dL    Calcium 9.3 8.5 - 10.5 mg/dL    AST(SGOT) 38 12 - 45 U/L    ALT(SGPT) 43 2 - 50 U/L    Alkaline Phosphatase 126 (H) 30 - 99 U/L    Total Bilirubin 0.5 0.1 - 1.5 mg/dL    Albumin 3.7 3.2 - 4.9 g/dL    Total Protein 7.3 6.0 - 8.2 g/dL    Globulin 3.6 (H) 1.9 - 3.5 g/dL    A-G Ratio 1.0 g/dL   LACTIC ACID   Result Value Ref Range    Lactic Acid 0.8 0.5 - 2.0 mmol/L   MAGNESIUM   Result Value Ref Range    Magnesium 1.8 1.5 - 2.5 mg/dL   ESTIMATED GFR   Result Value Ref Range    GFR If African American >60 >60 mL/min/1.73 m 2    GFR If Non African American >60 >60 mL/min/1.73 m 2       RADIOLOGY  No orders to display     In [place] 2/21-2/23      HYDRATION: Based on the patient's presentation of Dehydration the patient was given IV fluids. IV Hydration was used because oral hydration was not adequate alone. Upon recheck following hydration, the patient was stable, improved.    COURSE & MEDICAL DECISION MAKING  Patient arrives from urgent care for possible exposure to COVID19 virus and the possible need for inpatient treatment.  Patient arrives somewhat tachycardic and febrile.   He otherwise has no respiratory symptoms and appears nontoxic.  I feel he is likely dehydrated from the fever although it is notable he has had the fever for almost a month.  This is curious as most viral processes do not last this long.  It is possible he is been having back to back infective processes.  It was notable that patient was in [place] February 21-23 playing softball.  Will initiate laboratory evaluation however it is already notable that the influenza and urinalysis are normal.  He does not have any clinical signs of strep pharyngitis.  I suspect that if the patient's tachycardia resolves to an acceptable degree and he does not experience any deterioration in his hemodynamics or respiratory status he will be dischargeable with watchful waiting and follow-up with the [facility] per protocol.    2:27 AM  Patient in no distress, heart rate 101, regular rhythm.  He has no chest pain or shortness of breath.  States understanding of the findings however I feel that as the cough is been present for the better part of a month, he may be suffering from an atypical type of pneumonia and I feel empiric treatment with azithromycin is reasonable while awaiting follow-up with the [facility] department as an outpatient.  I do not feel he requires inpatient admission at this point despite his mild tachycardia.  I suspect that his tachycardia is related to dehydration and the fact that he is febrile.  I do not suspect early or impending sepsis/cardiorespiratory failure.  Patient has no comorbid respiratory conditions and is otherwise healthy.  Per the CDC guidelines I feel he is safe for outpatient self-isolation until the health department follows up with him.  He will return if his symptoms worsen or change in any way.  In the meantime I feel it is reasonable to continue empiric azithromycin for community-acquired atypical pneumonia such as chlamydia and mycoplasma pneumonias.  Patient is understanding  of this and will be directed to the CDC website for guidelines of home care.  Of note, I did not order respiratory viral panel as I did not feel  as an outpatient.    FINAL IMPRESSION  1. Atypical pneumonia    2. Dehydration        Electronically signed by: Jose G Willoughby M.D., 3/10/2020 12:31 AM

## 2020-03-11 ENCOUNTER — APPOINTMENT (OUTPATIENT)
Dept: RADIOLOGY | Facility: MEDICAL CENTER | Age: 55
DRG: 871 | End: 2020-03-11
Attending: HOSPITALIST
Payer: COMMERCIAL

## 2020-03-11 LAB
ANION GAP SERPL CALC-SCNC: 7 MMOL/L (ref 0–11.9)
BASOPHILS # BLD AUTO: 0.3 % (ref 0–1.8)
BASOPHILS # BLD: 0.03 K/UL (ref 0–0.12)
BUN SERPL-MCNC: 12 MG/DL (ref 8–22)
CALCIUM SERPL-MCNC: 9.1 MG/DL (ref 8.5–10.5)
CHLORIDE SERPL-SCNC: 103 MMOL/L (ref 96–112)
CO2 SERPL-SCNC: 24 MMOL/L (ref 20–33)
CREAT SERPL-MCNC: 0.8 MG/DL (ref 0.5–1.4)
EOSINOPHIL # BLD AUTO: 0.17 K/UL (ref 0–0.51)
EOSINOPHIL NFR BLD: 1.5 % (ref 0–6.9)
ERYTHROCYTE [DISTWIDTH] IN BLOOD BY AUTOMATED COUNT: 41.3 FL (ref 35.9–50)
GLUCOSE SERPL-MCNC: 117 MG/DL (ref 65–99)
HCT VFR BLD AUTO: 35.5 % (ref 42–52)
HGB BLD-MCNC: 12.5 G/DL (ref 14–18)
IMM GRANULOCYTES # BLD AUTO: 0.04 K/UL (ref 0–0.11)
IMM GRANULOCYTES NFR BLD AUTO: 0.3 % (ref 0–0.9)
LACTATE BLD-SCNC: 0.8 MMOL/L (ref 0.5–2)
LACTATE BLD-SCNC: 1 MMOL/L (ref 0.5–2)
LYMPHOCYTES # BLD AUTO: 1.51 K/UL (ref 1–4.8)
LYMPHOCYTES NFR BLD: 13.1 % (ref 22–41)
MCH RBC QN AUTO: 32.7 PG (ref 27–33)
MCHC RBC AUTO-ENTMCNC: 35.2 G/DL (ref 33.7–35.3)
MCV RBC AUTO: 92.9 FL (ref 81.4–97.8)
MONOCYTES # BLD AUTO: 0.86 K/UL (ref 0–0.85)
MONOCYTES NFR BLD AUTO: 7.5 % (ref 0–13.4)
NEUTROPHILS # BLD AUTO: 8.9 K/UL (ref 1.82–7.42)
NEUTROPHILS NFR BLD: 77.3 % (ref 44–72)
NRBC # BLD AUTO: 0 K/UL
NRBC BLD-RTO: 0 /100 WBC
PLATELET # BLD AUTO: 232 K/UL (ref 164–446)
PMV BLD AUTO: 9.5 FL (ref 9–12.9)
POTASSIUM SERPL-SCNC: 4.3 MMOL/L (ref 3.6–5.5)
RBC # BLD AUTO: 3.82 M/UL (ref 4.7–6.1)
SODIUM SERPL-SCNC: 134 MMOL/L (ref 135–145)
WBC # BLD AUTO: 11.5 K/UL (ref 4.8–10.8)

## 2020-03-11 PROCEDURE — 99222 1ST HOSP IP/OBS MODERATE 55: CPT | Performed by: INTERNAL MEDICINE

## 2020-03-11 PROCEDURE — 99233 SBSQ HOSP IP/OBS HIGH 50: CPT | Performed by: HOSPITALIST

## 2020-03-11 PROCEDURE — 72157 MRI CHEST SPINE W/O & W/DYE: CPT

## 2020-03-11 PROCEDURE — 700111 HCHG RX REV CODE 636 W/ 250 OVERRIDE (IP): Performed by: HOSPITALIST

## 2020-03-11 PROCEDURE — 700102 HCHG RX REV CODE 250 W/ 637 OVERRIDE(OP): Performed by: HOSPITALIST

## 2020-03-11 PROCEDURE — 700117 HCHG RX CONTRAST REV CODE 255: Performed by: HOSPITALIST

## 2020-03-11 PROCEDURE — 72158 MRI LUMBAR SPINE W/O & W/DYE: CPT

## 2020-03-11 PROCEDURE — 700105 HCHG RX REV CODE 258: Performed by: HOSPITALIST

## 2020-03-11 PROCEDURE — 770020 HCHG ROOM/CARE - TELE (206)

## 2020-03-11 PROCEDURE — 85025 COMPLETE CBC W/AUTO DIFF WBC: CPT

## 2020-03-11 PROCEDURE — A9270 NON-COVERED ITEM OR SERVICE: HCPCS | Performed by: HOSPITALIST

## 2020-03-11 PROCEDURE — 36415 COLL VENOUS BLD VENIPUNCTURE: CPT

## 2020-03-11 PROCEDURE — 80048 BASIC METABOLIC PNL TOTAL CA: CPT

## 2020-03-11 PROCEDURE — A9576 INJ PROHANCE MULTIPACK: HCPCS | Performed by: HOSPITALIST

## 2020-03-11 PROCEDURE — 83605 ASSAY OF LACTIC ACID: CPT | Mod: 91,QW

## 2020-03-11 RX ADMIN — OXYCODONE HYDROCHLORIDE 10 MG: 10 TABLET ORAL at 17:05

## 2020-03-11 RX ADMIN — ACETAMINOPHEN 1000 MG: 500 TABLET ORAL at 13:50

## 2020-03-11 RX ADMIN — HYDROMORPHONE HYDROCHLORIDE 0.5 MG: 1 INJECTION, SOLUTION INTRAMUSCULAR; INTRAVENOUS; SUBCUTANEOUS at 04:34

## 2020-03-11 RX ADMIN — CEFTRIAXONE SODIUM 2 G: 2 INJECTION, POWDER, FOR SOLUTION INTRAMUSCULAR; INTRAVENOUS at 16:55

## 2020-03-11 RX ADMIN — HYDROMORPHONE HYDROCHLORIDE 0.5 MG: 1 INJECTION, SOLUTION INTRAMUSCULAR; INTRAVENOUS; SUBCUTANEOUS at 01:27

## 2020-03-11 RX ADMIN — SODIUM CHLORIDE, POTASSIUM CHLORIDE, SODIUM LACTATE AND CALCIUM CHLORIDE: 600; 310; 30; 20 INJECTION, SOLUTION INTRAVENOUS at 23:02

## 2020-03-11 RX ADMIN — VANCOMYCIN HYDROCHLORIDE 1500 MG: 500 INJECTION, POWDER, LYOPHILIZED, FOR SOLUTION INTRAVENOUS at 11:03

## 2020-03-11 RX ADMIN — HYDROMORPHONE HYDROCHLORIDE 0.5 MG: 1 INJECTION, SOLUTION INTRAMUSCULAR; INTRAVENOUS; SUBCUTANEOUS at 09:16

## 2020-03-11 RX ADMIN — GADOTERIDOL 20 ML: 279.3 INJECTION, SOLUTION INTRAVENOUS at 20:06

## 2020-03-11 RX ADMIN — OXYCODONE HYDROCHLORIDE 10 MG: 10 TABLET ORAL at 13:50

## 2020-03-11 RX ADMIN — SENNOSIDES AND DOCUSATE SODIUM 2 TABLET: 8.6; 5 TABLET ORAL at 16:54

## 2020-03-11 RX ADMIN — OXYCODONE HYDROCHLORIDE 10 MG: 10 TABLET ORAL at 20:35

## 2020-03-11 RX ADMIN — ALLOPURINOL 200 MG: 100 TABLET ORAL at 05:39

## 2020-03-11 RX ADMIN — ACETAMINOPHEN 1000 MG: 500 TABLET ORAL at 04:35

## 2020-03-11 RX ADMIN — ACETAMINOPHEN 1000 MG: 500 TABLET ORAL at 20:36

## 2020-03-11 RX ADMIN — ALLOPURINOL 200 MG: 100 TABLET ORAL at 16:54

## 2020-03-11 RX ADMIN — LISINOPRIL 10 MG: 10 TABLET ORAL at 05:39

## 2020-03-11 RX ADMIN — ALLOPURINOL 200 MG: 100 TABLET ORAL at 00:20

## 2020-03-11 RX ADMIN — SODIUM CHLORIDE, POTASSIUM CHLORIDE, SODIUM LACTATE AND CALCIUM CHLORIDE: 600; 310; 30; 20 INJECTION, SOLUTION INTRAVENOUS at 09:16

## 2020-03-11 RX ADMIN — VANCOMYCIN HYDROCHLORIDE 1500 MG: 500 INJECTION, POWDER, LYOPHILIZED, FOR SOLUTION INTRAVENOUS at 23:31

## 2020-03-11 RX ADMIN — SODIUM CHLORIDE, POTASSIUM CHLORIDE, SODIUM LACTATE AND CALCIUM CHLORIDE: 600; 310; 30; 20 INJECTION, SOLUTION INTRAVENOUS at 00:24

## 2020-03-11 RX ADMIN — OXYCODONE HYDROCHLORIDE 10 MG: 10 TABLET ORAL at 23:30

## 2020-03-11 RX ADMIN — VANCOMYCIN HYDROCHLORIDE 2300 MG: 500 INJECTION, POWDER, LYOPHILIZED, FOR SOLUTION INTRAVENOUS at 00:41

## 2020-03-11 RX ADMIN — OXYCODONE HYDROCHLORIDE 5 MG: 5 TABLET ORAL at 00:20

## 2020-03-11 ASSESSMENT — ENCOUNTER SYMPTOMS
FOCAL WEAKNESS: 0
EYE REDNESS: 0
ABDOMINAL PAIN: 0
WHEEZING: 0
SHORTNESS OF BREATH: 0
SPEECH CHANGE: 0
EYE DISCHARGE: 0
SHORTNESS OF BREATH: 1
NECK PAIN: 0
STRIDOR: 0
COUGH: 0
CHILLS: 1
DIZZINESS: 0
FLANK PAIN: 1
TREMORS: 0
WEAKNESS: 0
NAUSEA: 0
BACK PAIN: 1
PALPITATIONS: 0
VOMITING: 0
DIAPHORESIS: 0
HEADACHES: 0
SENSORY CHANGE: 0
DIARRHEA: 0
HALLUCINATIONS: 0
FEVER: 1

## 2020-03-11 ASSESSMENT — COGNITIVE AND FUNCTIONAL STATUS - GENERAL
DAILY ACTIVITIY SCORE: 24
SUGGESTED CMS G CODE MODIFIER MOBILITY: CH
MOBILITY SCORE: 24
SUGGESTED CMS G CODE MODIFIER DAILY ACTIVITY: CH

## 2020-03-11 ASSESSMENT — LIFESTYLE VARIABLES
ON A TYPICAL DAY WHEN YOU DRINK ALCOHOL HOW MANY DRINKS DO YOU HAVE: 1
TOTAL SCORE: 0
EVER FELT BAD OR GUILTY ABOUT YOUR DRINKING: NO
DOES PATIENT WANT TO STOP DRINKING: NO
HAVE PEOPLE ANNOYED YOU BY CRITICIZING YOUR DRINKING: NO
TOTAL SCORE: 0
CONSUMPTION TOTAL: NEGATIVE
HOW MANY TIMES IN THE PAST YEAR HAVE YOU HAD 5 OR MORE DRINKS IN A DAY: 0
AVERAGE NUMBER OF DAYS PER WEEK YOU HAVE A DRINK CONTAINING ALCOHOL: 1
SUBSTANCE_ABUSE: 0
TOTAL SCORE: 0
EVER HAD A DRINK FIRST THING IN THE MORNING TO STEADY YOUR NERVES TO GET RID OF A HANGOVER: NO
EVER_SMOKED: NEVER
HAVE YOU EVER FELT YOU SHOULD CUT DOWN ON YOUR DRINKING: NO
ALCOHOL_USE: YES

## 2020-03-11 ASSESSMENT — FIBROSIS 4 INDEX: FIB4 SCORE: 1.58

## 2020-03-11 NOTE — CONSULTS
INFECTIOUS DISEASES INPATIENT CONSULT NOTE     Date of Service: 3/11/2020    Consult Requested By: Eben Mendieta M.D.    Reason for Consultation: Gram-positive bacteremia    History of Present Illness:   Sanjeev Malagon is a 54 y.o. man with history of hyperlipidemia, gout, known murmur and hypertension admitted 3/10/2020 for fevers.  Patient was in usual state of health until approximately 1 month prior to admission when he started to experience fevers.  His fevers have gotten progressively worse.  He has also noted subjective chills and night sweats.  No unintentional weight loss.  Over the last week he is also noted worsening lower back pain.  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  However with his fevers, he noted increasing back pain associated with diffuse joint pain.  He denies any cough, but endorsed chest tightness and shortness of breath prior to admission.  He denies any recent procedures.  No skin lesions or skin breakdown.  Approximately 8 to 9 months ago, he underwent upper teeth dental extractions and had upper dentures placed.  His dentist at that time recommended that his lower teeth be extracted secondary to significant infection and dental caries however the patient refused.  He has not noted any abnormal taste in his mouth or oral drainage.  He recently had multiple teeth extracted and dentures placed.  On 3/9, he had presented to urgent care where he was noted to have a fever.  As such she was referred to the ED for further evaluation and management.  Patient was felt to have a viral syndrome, he was also noted to have a mildly elevated white count of 11.6 and a normal lactic acid.  Blood cultures were obtained at that time.  He was discharged home.  Blood cultures however returned positive for possible Streptococcus species in both sets and as a result, he was contacted and asked to return to the hospital for  further evaluation and management.  On presentation, he was initially afebrile however he did spike a low-grade temperature of 100.1.  Patient had a leukocytosis of 11.6. Patient is currently on ceftriaxone and vancomycin.  TTE reveals a mobile vegetation on the mitral valve.  MRI of the thoracic and lumbar spine have been ordered secondary to back pain.  Infectious disease service consulted for further antibiotic recommendations and management.    Review of Systems   Constitutional: Positive for chills and fever.   Respiratory: Positive for shortness of breath. Negative for cough.    Cardiovascular: Positive for chest pain.        Chest tightness   Gastrointestinal: Negative for abdominal pain, diarrhea, nausea and vomiting.   Genitourinary: Negative for dysuria.   Musculoskeletal: Positive for back pain and joint pain.   Neurological: Negative for dizziness and headaches.   All other systems reviewed and are negative.      PMH:   Past Medical History:   Diagnosis Date   • Hyperlipidemia    • Hypertension    Gout    PSH:  History reviewed. No pertinent surgical history.    FAMILY HX:  Sister with breast cancer    SOCIAL HX:  Social History     Socioeconomic History   • Marital status:      Spouse name: Not on file   • Number of children: Not on file   • Years of education: Not on file   • Highest education level: Not on file   Occupational History   • Not on file   Social Needs   • Financial resource strain: Not on file   • Food insecurity     Worry: Not on file     Inability: Not on file   • Transportation needs     Medical: Not on file     Non-medical: Not on file   Tobacco Use   • Smoking status: Never Smoker   • Smokeless tobacco: Never Used   Substance and Sexual Activity   • Alcohol use: Never     Frequency: Never   • Drug use: Never   • Sexual activity: Not on file   Lifestyle   • Physical activity     Days per week: Not on file     Minutes per session: Not on file   • Stress: Not on file    Relationships   • Social connections     Talks on phone: Not on file     Gets together: Not on file     Attends [employer] service: Not on file     Active member of club or organization: Not on file     Attends meetings of clubs or organizations: Not on file     Relationship status: Not on file   • Intimate partner violence     Fear of current or ex partner: Not on file     Emotionally abused: Not on file     Physically abused: Not on file     Forced sexual activity: Not on file   Other Topics Concern   • Not on file   Social History Narrative   • Not on file     Social History     Tobacco Use   Smoking Status Never Smoker   Smokeless Tobacco Never Used     Social History     Substance and Sexual Activity   Alcohol Use Never   • Frequency: Never       Allergies/Intolerances:  Allergies   Allergen Reactions   • Morphine Hives       History reviewed with the patient     Other Current Medications:    Current Facility-Administered Medications:   •  allopurinol (ZYLOPRIM) tablet 200 mg, 200 mg, Oral, BID, Dorota Rob M.D., 200 mg at 03/11/20 0539  •  lisinopril (PRINIVIL) tablet 10 mg, 10 mg, Oral, QDAY, Dorota Rob M.D., 10 mg at 03/11/20 0539  •  senna-docusate (PERICOLACE or SENOKOT S) 8.6-50 MG per tablet 2 Tab, 2 Tab, Oral, BID **AND** polyethylene glycol/lytes (MIRALAX) PACKET 1 Packet, 1 Packet, Oral, QDAY PRN **AND** magnesium hydroxide (MILK OF MAGNESIA) suspension 30 mL, 30 mL, Oral, QDAY PRN **AND** bisacodyl (DULCOLAX) suppository 10 mg, 10 mg, Rectal, QDAY PRN, Dorota Rob M.D.  •  lactated ringers infusion (BOLUS), 1,000 mL, Intravenous, Once PRNDorota M.D.  •  lactated ringers infusion, , Intravenous, Continuous, Dorota Rob M.D., Last Rate: 125 mL/hr at 03/11/20 0024  •  lactated ringers infusion (BOLUS): BMI less than or equal to 30, 30 mL/kg, Intravenous, Once PRNDorota M.D.  •  acetaminophen (TYLENOL) tablet 1,000 mg, 1,000 mg, Oral, Q6HRS PRN, Dorota Rob M.D.,  "1,000 mg at 20 0435  •  ondansetron (ZOFRAN) syringe/vial injection 4 mg, 4 mg, Intravenous, Q4HRS PRN, Dorota Rob M.D.  •  ondansetron (ZOFRAN ODT) dispertab 4 mg, 4 mg, Oral, Q4HRS PRN, Dorota Rob M.D.  •  promethazine (PHENERGAN) tablet 12.5-25 mg, 12.5-25 mg, Oral, Q4HRS PRNDorota M.D.  •  promethazine (PHENERGAN) suppository 12.5-25 mg, 12.5-25 mg, Rectal, Q4HRS PRN, Dorota Rob M.D.  •  prochlorperazine (COMPAZINE) injection 5-10 mg, 5-10 mg, Intravenous, Q4HRS PRN, Dorota Rob M.D.  •  Notify provider if pain remains uncontrolled, , , CONTINUOUS **AND** Use the numeric rating scale (NRS-11) on regular floors and Critical-Care Pain Observation Tool (CPOT) on ICUs/Trauma to assess pain, , , CONTINUOUS **AND** Pulse Ox (Oximetry), , , CONTINUOUS **AND** Pharmacy Consult Request ...Pain Management Review 1 Each, 1 Each, Other, PHARMACY TO DOSE **AND** If patient difficult to arouse and/or has respiratory depression, stop any opiates that are currently infusing and call a Rapid Response., , , CONTINUOUS **AND** oxyCODONE immediate-release (ROXICODONE) tablet 5 mg, 5 mg, Oral, Q3HRS PRN, 5 mg at 20 0020 **AND** oxyCODONE immediate release (ROXICODONE) tablet 10 mg, 10 mg, Oral, Q3HRS PRN **AND** HYDROmorphone pf (DILAUDID) injection 0.5 mg, 0.5 mg, Intravenous, Q3HRS PRN, Dorota Rob M.D., 0.5 mg at 20 0434  •  cefTRIAXone (ROCEPHIN) 2 g in  mL IVPB, 2 g, Intravenous, Q24HRS, Dorota Rob M.D.  •  MD ALERT... vancomycin per MD, , Other, Physician to Dose, Dorota Rob M.D.  •  vancomycin (VANCOCIN) 1,500 mg in  mL IVPB, 1,500 mg, Intravenous, Q12HR, Dorota Rob M.D.  [unfilled]    Most Recent Vital Signs:  /80   Pulse (!) 108   Temp 37.8 °C (100.1 °F) (Oral) Comment: RN notifiee  Resp 20   Ht 1.753 m (5' 9\")   Wt 90.6 kg (199 lb 11.8 oz)   SpO2 94%   BMI 29.50 kg/m²   Temp  Av.2 °C (98.9 °F)  Min: 36.8 °C (98.2 °F)  Max: 37.8 " °C (100.1 °F)    Physical Exam:  General: well nourished, no diaphoresis, well-appearing, no acute distress, nontoxic  HEENT: sclera anicteric, PERRL, extraocular muscles intact, mucous membranes moist, oropharynx clear and moist, no oral lesions or exudate.  Upper dentures.  Dental caries and lower teeth  Neck: supple, no lymphadenopathy  Chest: CTAB, no rales, rhonchi or wheezes, normal work of breathing.  Cardiac: regular rate, normal S1 S2, holosystolic murmur, but no rubs or gallops  Abdomen: + bowel sounds, soft, non-tender, non-distended, no hepatosplenomegaly  Back: Bilateral lumbar paraspinal tenderness on palpation  Extremities: WWP, no edema, 2+ pedal pulses  Skin: warm and dry, no rashes or worrisome lesions  Neuro: Alert and oriented times 3, non-focal exam, speech fluent, full range of motion to bilateral upper and lower extremities  Psych: normal mood and behavior, pleasant; memory intact, normal judgement    Pertinent Lab Results:  Recent Labs     03/10/20  0100 03/10/20  1904 03/11/20  0516   WBC 11.6* 11.6* 11.5*      Recent Labs     03/10/20  0100 03/10/20  1904 03/11/20  0516   HEMOGLOBIN 12.6* 12.3* 12.5*   HEMATOCRIT 36.3* 35.0* 35.5*   MCV 94.5 92.1 92.9   MCH 32.8 32.4 32.7   PLATELETCT 234 246 232         Recent Labs     03/10/20  0100 03/10/20  1904 03/11/20  0516   SODIUM 136 137 134*   POTASSIUM 4.2 4.4 4.3   CHLORIDE 107 107 103   CO2 20 21 24   CREATININE 0.84 0.83 0.80        Recent Labs     03/10/20  0100 03/10/20  1904   ALBUMIN 3.7 3.9        Pertinent Micro:  Results     Procedure Component Value Units Date/Time    BLOOD CULTURE [[id number]] Collected:  03/10/20 1904    Order Status:  Completed Specimen:  Blood from Peripheral Updated:  03/11/20 0737     Significant Indicator NEG     Source BLD     Site PERIPHERAL     Culture Result No Growth  Note: Blood cultures are incubated for 5 days and  are monitored continuously.Positive blood cultures  are called to the RN and reported as  "soon as  they are identified.      Narrative:       Per Hospital Policy: Only change Specimen Src: to \"Line\" if  specified by physician order.  Right Forearm/Arm    Blood Culture [[id number]]     Order Status:  Sent Specimen:  Blood from Peripheral     Blood Culture [[id number]]     Order Status:  Sent Specimen:  Blood from Peripheral     Urinalysis [[id number]]     Order Status:  Sent Specimen:  Urine     URINALYSIS [[id number]] Collected:  03/10/20 1904    Order Status:  Completed Specimen:  Urine Updated:  03/10/20 2025     Color Yellow     Character Clear     Specific Gravity 1.014     Ph 7.0     Glucose Negative mg/dL      Ketones Negative mg/dL      Protein Negative mg/dL      Bilirubin Negative     Urobilinogen, Urine 1.0     Nitrite Negative     Leukocyte Esterase Negative     Occult Blood Negative     Micro Urine Req see below     Comment: Microscopic examination not performed when specimen is clear  and chemically negative for protein, blood, leukocyte esterase  and nitrite.         Narrative:       Indication for culture:->Septic Shock: Persistent  hypotension,  Lactic acid > 4, vasopressors/inotropes started    URINE CULTURE(NEW) [[id number]] Collected:  03/10/20 1904    Order Status:  Completed Specimen:  Urine Updated:  03/10/20 2020    Narrative:       Indication for culture:->Septic Shock: Persistent  hypotension,  Lactic acid > 4, vasopressors/inotropes started    BLOOD CULTURE [[id number]]     Order Status:  Sent Specimen:  Blood from Peripheral         No results found for: BLOODCULTU, BLDCULT, BCHOLD     Studies:  Dx-chest-2 Views    Result Date: 3/9/2020    3/9/2020 10:57 PM HISTORY/REASON FOR EXAM: COUGH; Cough TECHNIQUE/EXAM DESCRIPTION:  PA and lateral views of the chest. COMPARISON: None FINDINGS: The cardiac silhouette appears within normal limits. The mediastinal contour appears within normal limits.  The central pulmonary vasculature appears normal. The lungs appear well expanded bilaterally.  " Bilateral lungs are clear. No significant pleural effusions are identified. The bony structures appear age-appropriate.     1.  No acute cardiopulmonary disease.    Dx-chest-portable (1 View)    Result Date: 3/10/2020  3/10/2020 6:45 PM HISTORY/REASON FOR EXAM:  Sepsis and shortness of breath TECHNIQUE/EXAM DESCRIPTION AND NUMBER OF VIEWS: Single portable view of the chest. COMPARISON: 2020 FINDINGS: Heart size is within normal limits. No focal infiltrates or consolidations are identified in the lungs. No pleural fluid collections are identified. No pneumothorax is appreciated.     No acute cardiac or pulmonary abnormality is identified.    Ec-echocardiogram Complete W/o Cont    Result Date: 3/10/2020  Transthoracic Echo Report Echocardiography Laboratory CONCLUSIONS No prior study is available for comparison. Normal left ventricular chamber size. Moderate concentric left ventricular hypertrophy. Left ventricular ejection fraction is visually estimated to be 70%. Mobile vegetation seen on the anterior mitral valve leaflet. Mild mitral regurgitation. Severe aortic stenosis. Transvalvular gradients are - Peak: 104 mmHg, Mean: 59 mmHg. DEBBIE NIEVES Exam Date:         03/10/2020                    20:57 Exam Location:     Inpatient Priority:          Stat Ordering Physician:        JEFF DAVEY Referring Physician:       [id number] Sonographer:               Ana Ho RDCS Age:    54     Gender:    M MRN:    [id number] :    1965 BSA:    2.07   Ht (in):    69     Wt (lb):    202 Exam Type:     Complete Indications:     Acute rheumatic endocarditis, Endocarditis ICD Codes:       I01.1  421 CPT Codes:       00714 BP:   105    /   65     HR:   97 Technical Quality:       Fair MEASUREMENTS  (Male / Female) Normal Values 2D ECHO LV Diastolic Diameter PLAX        3.5 cm                4.2 - 5.9 / 3.9 - 5.3 cm LV Systolic Diameter PLAX         1.7 cm                2.1 - 4.0 cm IVS Diastolic  Thickness           1.4 cm                LVPW Diastolic Thickness          1.5 cm                LVOT Diameter                     2.1 cm                Estimated LV Ejection Fraction    70 %                  LV Ejection Fraction MOD BP       70.6 %                >= 55  % LV Ejection Fraction MOD 4C       76.9 %                LV Ejection Fraction MOD 2C       63.7 %                DOPPLER AV Peak Velocity                  4.8 m/s               AV Peak Gradient                  93.6 mmHg             AV Mean Gradient                  52.8 mmHg             LVOT Peak Velocity                1.3 m/s               AV Area Cont Eq vti               1 cm2                 Mitral E Point Velocity           1.1 m/s               Mitral E to A Ratio               1.2                   MV Pressure Half Time             56 ms                 MV Area PHT                       3.9 cm2               MV Deceleration Time              193 ms                MR ERO PISA                       0.045 cm2             MR Regurgitant Volume PISA        6.2 cm3               TR Peak Velocity                  292 cm/s              PV Peak Velocity                  1.4 m/s               PV Peak Gradient                  7.3 mmHg              RVOT Peak Velocity                0.76 m/s              * Indicates values subject to auto-interpretation LV EF:  70    % FINDINGS Left Ventricle Normal left ventricular chamber size. Moderate concentric left ventricular hypertrophy. Normal left ventricular systolic function. Left ventricular ejection fraction is visually estimated to be 70%. Normal diastolic function. Right Ventricle The right ventricle was normal in size and function. Right Atrium The right atrium is normal in size.  Normal inferior vena cava size and inspiratory collapse. Left Atrium The left atrium is normal in size.  Left atrial volume index is 32 mL/sq m. Mitral Valve Mobile vegetation seen on the anterior mitral valve leaflet. Mild  mitral regurgitation. No mitral stenosis. Aortic Valve Severe aortic stenosis. Vmax is 5.0 m/s. Transvalvular gradients are - Peak: 104 mmHg, Mean: 59 mmHg. Tricuspid Valve Structurally normal tricuspid valve without significant stenosis. Mild tricuspid regurgitation. Estimated right ventricular systolic pressure  is 38 mmHg. Right atrial pressure is estimated to be 3 mmHg. Pulmonic Valve Structurally normal pulmonic valve without significant stenosis or regurgitation. Pericardium Normal pericardium without effusion. Aorta The aortic root is normal. Sebastian Capps M.D. (Electronically Signed) Final Date:     10 March 2020                 22:25      IMPRESSION:   1.  Gram-positive bacteremia    2.  Mitral valve infective endocarditis  3.  Leukocytosis      PLAN:   Sanjeev Malagon is a 54 y.o. man with a history of gout, known heart murmur, and hypertension admitted for positive blood cultures in the setting of fevers for 1 month.  Patient with gram-positive bacteremia with possible Streptococcus species and now infective endocarditis with a vegetation on his mitral valve.  Source of his bacteremia is likely from his mouth given known dental caries.  His dentist did advise lower teeth extraction many months ago however the patient refused at that time.  Agree with continuing ceftriaxone and vancomycin (to cover for possible enterococcus)  pending further speciation.  Monitor renal function and Vanco trough.  Repeat blood cultures have been ordered for tomorrow morning.  Agree with MRI of the lumbar and thoracic spine given acute worsening of lower back pain.  Patient will ultimately require a PICC line and 6 weeks of IV antibiotics from the date of the first negative blood culture.  Further recommendations per hospital course and culture results.      Plan of care discussed with FELICITY Mendieta M.D.. Will continue to follow    Katia Talavera M.D.      Please note that this dictation was created using voice  recognition software. I have worked with technical experts from [facility] to optimize the interface.  I have made every reasonable attempt to correct obvious errors, but there may be errors of grammar and possibly content that I did not discover before finalizing the note.

## 2020-03-11 NOTE — ASSESSMENT & PLAN NOTE
This is Sepsis Present on admission  SIRS criteria identified on my evaluation include: Fever, with temperature greater than 101 deg F, Tachycardia, with heart rate greater than 90 BPM and Leukocyosis, with WBC greater than 12,000  Source is endocarditis.  Sepsis protocol initiated  Fluid resuscitation ordered per protocol  IV antibiotics as appropriate for source of sepsis  While organ dysfunction may be noted elsewhere in this problem list or in the chart, degree of organ dysfunction does not meet CMS criteria for severe sepsis  S Viridans bacteremia-  Suspect odontogenic source.  Continue IV Rocephin . Midline placed.  Will arrange for home infusion.   Prn tylenol for fevers  Additional workup of MV lesion with BEATRIZ tomorrow.  WBC increased- will monitor.   ID input.

## 2020-03-11 NOTE — ED NOTES
Med rec completed per pt and wife at bedside  Allergies reviewed  Pt was given a 4 day course of azithromycin but did not start yet due to being called back into ER

## 2020-03-11 NOTE — ED NOTES
Assumed care from KEILA Harris  Patient resting in bed comfortably  No visible signs of distress  Patient updated on plan of care   Awaiting results

## 2020-03-11 NOTE — CARE PLAN
Problem: Safety  Goal: Will remain free from injury  Outcome: PROGRESSING AS EXPECTED  Goal: Will remain free from falls  Outcome: PROGRESSING AS EXPECTED     Fall precautions in place. Treaded socks on pt. Appropriate signs on doorway. IV pole on same side as bathroom. Bedrails up. Bed in lowest position and locked.  Call light and phone within reach. Patient educated on importance of calling nurses before getting out of bed, verbalizes understanding.    Problem: Infection  Goal: Will remain free from infection  Outcome: PROGRESSING AS EXPECTED     Assess for signs and symptoms of infection. Monitor vital signs and lab values. Implement standard precautions and hand washing before and after pt contact.

## 2020-03-11 NOTE — PROGRESS NOTES
"Pharmacy Kinetics 54 y.o. male on vancomycin day # 1 3/10/2020    Received loading dose: vancomycin 2300 mg iv once  Provider specified end date: none    Indication for Treatment: bacteremia    Pertinent history per medical record: Admitted on 3/10/2020 after being contacted with positive blood cultures. The patient was here in the emergency department yesterday, has been complaining of fever intermittently for a month but really ramping up over the past 2 weeks.  Pt went to urgent care yesterday, negative flu, negative strep and negative viral respiratory panel, contacted health department and was deemed not a candidate for coronavirus testing.  Here in the emergency department yesterday had a minimally elevated WBC, had the blood cultures drawn which both came back positive for gram-positive cocci likely strep.    Other antibiotics: ceftriaxone 2 g q24h    Allergies: Morphine     Concerns for renal function:  obesity    Pertinent cultures to date:   3/10: blood cultures x2 - gram positive cocci, possible strep sp.      Recent Labs     03/10/20  0100 03/10/20  1904   WBC 11.6* 11.6*   NEUTSPOLYS 73.20* 72.00     Recent Labs     03/10/20  0100 03/10/20  1904   BUN 16 15   CREATININE 0.84 0.83   ALBUMIN 3.7 3.9       /78   Pulse 98   Temp 36.9 °C (98.4 °F) (Temporal)   Resp 18   Ht 1.753 m (5' 9\")   Wt 90.6 kg (199 lb 11.8 oz)   SpO2 94%  Temp (24hrs), Av.8 °C (98.3 °F), Min:36.8 °C (98.2 °F), Max:36.9 °C (98.4 °F)    A/P   1. Vancomycin dose change: 1500 mg q12h (17 mg/kg)  2. Next vancomycin level: draw trough prior to 4th dose (not yet ordered)  3. Goal trough: 12-16 mcg/ml  4. Comments: some concern for accumulation with obesity, renal function appears to be at baseline currently. No historical vancomycin dosing for reference. Will continue to follow for final blood cultures and de-escalation as appropriate.    Radha Lemon, PharmD      "

## 2020-03-11 NOTE — ASSESSMENT & PLAN NOTE
Likely source of bacteremia.  Advised patient he needs to have the remainder of his teeth removed.

## 2020-03-11 NOTE — ED NOTES
Pt reports recently being ruled out and released from quarantine for COVID-19 today by the [facility]. Pt denies any respiratory compromise/symptoms

## 2020-03-11 NOTE — PROGRESS NOTES
2 RN Skin Check    2 RN skin check complete.   Devices in place: Patient has telemetry box in place .  Skin assessed under devices: YES  Confirmed pressure ulcers found on: No, pressure ulcers were found on patient.  New potential pressure ulcers noted on: patient is not at risk for skin breakdown.    Patient skin was assessed from head to toe. I assessed behind both ears, elbows, and heels and soles of both feet. I also assessed patient back and sacrum and all skin areas are intact.

## 2020-03-11 NOTE — ED NOTES
Pt reports CP x15 min that started while walking to the ED from parking lot. described as pressure like in nature, non-radiating, 9/10 intensity. Hx of HTN and gout.

## 2020-03-11 NOTE — ED TRIAGE NOTES
Patient seen here yesterday had multiple tests including flu and co-vid 19, called today and told to come in for positive blood cultures, fevers up to 104.6 last night, patient is tachy currently.  No noted cough, no recent travel.  Charge nurse notified, scored sepsis of 3.

## 2020-03-11 NOTE — PROGRESS NOTES
Patient is a new admission from the ER. Patient is stable and Ax4, R.A. Patient is ambulatory and steady on feet.  Bed in lowest position. Bedside table within reach. Call bell at bedside.

## 2020-03-11 NOTE — ED NOTES
"ED Positive Culture Follow-up/Notification Note:    Date: 3/10/20     Patient seen in the ED on 3/9/2020 for fever that has been present for several weeks, waxing and waning body aches, intermittent flank pain, urinary frequency, and a nonproductive dry cough. The pt denied any SOB, difficulty breathing, hemoptysis, sore throat, dysuria, hematuria, or runny nose. The pt was found to have a prominent systolic murmur on physical exam.     1. Atypical pneumonia    2. Dehydration       Discharge Medication List as of 3/10/2020  2:52 AM      START taking these medications    Details   azithromycin (ZITHROMAX) 500 MG tablet Take 0.5 Tabs by mouth every day for 4 days.First dose of 500 mg given at 2:30 AM on 10 March.Disp-4 Tab, R-0, Print Rx Paper           Medications given in the ED:  -Azithromycin 500mg PO once  -NS 1L IV    Allergies: Patient has no known allergies.     Vitals:    03/10/20 0130 03/10/20 0201 03/10/20 0231 03/10/20 0300   BP: (!) 97/62 138/72 109/61 132/61   Pulse: (!) 108 (!) 111 (!) 107    Resp:       Temp:    38 °C (100.4 °F)   TempSrc:       SpO2: 92% 95% 95%    Weight:       Height:           Final cultures:   Results     Procedure Component Value Units Date/Time    BLOOD CULTURE [[id number]]  (Abnormal) Collected:  03/10/20 0100    Order Status:  Completed Specimen:  Blood from Peripheral Updated:  03/10/20 1539     Significant Indicator POS     Source BLD     Site PERIPHERAL     Culture Result Growth detected by Bactec instrument. 03/10/2020  15:39  Gram Stain: Gram positive cocci: Possible Streptococcus sp.      Narrative:       Airborne,Droplet  Per Hospital Policy: Only change Specimen Src: to \"Line\" if  specified by physician order.  Right Hand    BLOOD CULTURE [[id number]]  (Abnormal) Collected:  03/10/20 0105    Order Status:  Completed Specimen:  Blood from Peripheral Updated:  03/10/20 1539     Significant Indicator POS     Source BLD     Site PERIPHERAL     Culture Result Growth detected " "by Bactec instrument. 03/10/2020  15:38  Gram Stain: Gram positive cocci: Possible Streptococcus sp.      Narrative:       Airborne,Droplet  Per Hospital Policy: Only change Specimen Src: to \"Line\" if  specified by physician order.  Right AC    URINALYSIS CULTURE, IF INDICATED [[id number]] Collected:  03/10/20 0213    Order Status:  Completed Specimen:  Urine Updated:  03/10/20 0250     Color Yellow     Character Clear     Specific Gravity 1.013     Ph 5.0     Glucose Negative mg/dL      Ketones Negative mg/dL      Protein Negative mg/dL      Bilirubin Negative     Urobilinogen, Urine 0.2     Nitrite Negative     Leukocyte Esterase Negative     Occult Blood Negative     Micro Urine Req see below     Comment: Microscopic examination not performed when specimen is clear  and chemically negative for protein, blood, leukocyte esterase  and nitrite.         Narrative:       Airborne,Droplet          Plan:   The pt was discharged with azithromycin for possible atypical pneumonia, and the health department was notified given negative respiratory panel, negative flu, and negative pharyngitis tests.     I called and informed the pt of the results. He stated that he spoke to the [facility] department who \"lifted the self-quarantine\", and that he had a fever up to 104 degrees last night. I recommended to the pt that he return to the ED ASAP given two positive blood cultures and persistent high fevers. The pt stated that he would return right away.    Given two positive blood cultures with possible strep sp. and murmur heard on physical exam, the pt should be worked up for possible endocarditis. He denied any prosthetic valves, but stated that he had most of his top row of teeth removed d/t infections and poor dentition ~1 year ago. He also stated that he has poor dentition on his bottom row of teeth and was advised that they should be removed, however the pt does not want to have them removed. The pt's chest X-ray in the ED " was negative for any consolidations.     When the pt returns, I recommend working him up for endocarditis and starting him on IV ampicillin 2 grams Q4H until final culture results are obtained (will cover for strep and enterococcus). Given that the source is most likely oral, ceftriaxone 2 grams Q24H would be a reasonable option as well given that it will provide good coverage for oral strep sp (however will not cover enterococcus).    ED charge RN and ED PharmD notified       Tereso Ayers, PharmD

## 2020-03-11 NOTE — ASSESSMENT & PLAN NOTE
Blood cultures growing strep species. Anterior Mitral valve vegetation.  S. Virdans from dental infection.   Patient with back pain however MRI of the thoracic and lumbar spine without acute inflammatory changes or signs of infection.  Recommend removal of the remainder of his teeth as an outpatient.  Monitor for signs of acute heart failure.  Telemetry monitoring  Follow-up repeat blood cultures negative - midline placed.  Given persistent fevers- concern for complicated MV lesion, abscess- plan for BEATRIZ today

## 2020-03-11 NOTE — PROGRESS NOTES
[facility] Medicine Daily Progress Note    Date of Service  3/11/2020    Chief Complaint  54 y.o. male admitted 3/10/2020 with fevers.     Hospital Course    Patient with history of upper teeth extractions with dentures placed, lower tooth dental decay with future dental work planned admitted with fever over a month.  Patient was seen in ER on 3/9/2020 and thought to be viral illness.  Admitted following positive blood cultures and findings consistent with endocarditis.    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  Blood cultures positive Streptococcus species 3/3 bottles.  States sometimes feeling chills with medications wear off.  Also mid to lower back pain.     Consultants/Specialty  Dr Talavera ID    Code Status  Full     Disposition    Plan continue antibiotics, to be determined.  Will need prolonged IV antibiotics    Review of Systems  Review of Systems   Constitutional: Positive for chills, fever and malaise/fatigue. Negative for diaphoresis.   HENT: Negative for congestion.    Eyes: Negative for discharge and redness.   Respiratory: Negative for cough, shortness of breath, wheezing and stridor.    Cardiovascular: Negative for chest pain, palpitations and leg swelling.   Gastrointestinal: Negative for abdominal pain, diarrhea and vomiting.   Genitourinary: Positive for flank pain. Negative for hematuria.   Musculoskeletal: Positive for back pain. Negative for joint pain and neck pain.   Neurological: Negative for tremors, sensory change, speech change, focal weakness and weakness.   Psychiatric/Behavioral: Negative for hallucinations and substance abuse.        Physical Exam  Temp:  [36.8 °C (98.2 °F)-37.8 °C (100.1 °F)] 37.4 °C (99.3 °F)  Pulse:  [] 115  Resp:  [18-22] 18  BP: ()/(59-89) 92/59  SpO2:  [92 %-97 %] 92 %    Physical Exam  Constitutional:       General: He is not in acute distress.     Appearance: He is  obese. He is not diaphoretic.   HENT:      Head: Normocephalic and atraumatic.      Right Ear: External ear normal.      Left Ear: External ear normal.      Nose: Nose normal.   Eyes:      General: No scleral icterus.     Extraocular Movements: Extraocular movements intact.      Conjunctiva/sclera: Conjunctivae normal.   Neck:      Musculoskeletal: Normal range of motion. No neck rigidity.   Cardiovascular:      Rate and Rhythm: Normal rate and regular rhythm.      Heart sounds: Murmur (3/6 high-pitched systolic ejection murmur) present.   Pulmonary:      Breath sounds: No stridor. No wheezing, rhonchi or rales.   Abdominal:      General: There is no distension.      Palpations: Abdomen is soft.      Tenderness: There is no abdominal tenderness.   Musculoskeletal:         General: No swelling.   Skin:     General: Skin is dry.   Neurological:      General: No focal deficit present.      Mental Status: He is alert and oriented to person, place, and time.   Psychiatric:         Mood and Affect: Mood normal.         Behavior: Behavior normal.         Fluids    Intake/Output Summary (Last 24 hours) at 3/11/2020 1655  Last data filed at 3/11/2020 0800  Gross per 24 hour   Intake 340 ml   Output 1050 ml   Net -710 ml       Laboratory  Recent Labs     03/10/20  0100 03/10/20  1904 03/11/20  0516   WBC 11.6* 11.6* 11.5*   RBC 3.84* 3.80* 3.82*   HEMOGLOBIN 12.6* 12.3* 12.5*   HEMATOCRIT 36.3* 35.0* 35.5*   MCV 94.5 92.1 92.9   MCH 32.8 32.4 32.7   MCHC 34.7 35.1 35.2   RDW 42.0 40.8 41.3   PLATELETCT 234 246 232   MPV 9.4 9.3 9.5     Recent Labs     03/10/20  0100 03/10/20  1904 03/11/20  0516   SODIUM 136 137 134*   POTASSIUM 4.2 4.4 4.3   CHLORIDE 107 107 103   CO2 20 21 24   GLUCOSE 117* 116* 117*   BUN 16 15 12   CREATININE 0.84 0.83 0.80   CALCIUM 9.3 9.5 9.1     Recent Labs     03/10/20  1904   INR 1.13               Imaging  EC-ECHOCARDIOGRAM COMPLETE W/O CONT   Final Result      DX-CHEST-PORTABLE (1 VIEW)   Final  Result         No acute cardiac or pulmonary abnormality is identified.      MR-THORACIC SPINE-WITH    (Results Pending)   MR-LUMBAR SPINE-WITH    (Results Pending)        Assessment/Plan  * Acute bacterial endocarditis- (present on admission)  Assessment & Plan  Blood cultures growing strep species. Anterior  Mitral valve vegetation. Very likely S. Virdans from dental infection. Cxs pending.   Continue IV ceftriaxone and vancomycin  Patient is also having back pain, MRI with contrast of spine to evaluate for septic emboli/discitis/abscess  Recommend removal of the remainder of his teeth as an outpatient.  Monitor for signs of acute heart failure.  Telemetry monitoring  Further assessment with ? BEATRIZ  I discussed case with infectious disease, Dr. Talavera.    Sepsis (HCC)- (present on admission)  Assessment & Plan  This is Sepsis Present on admission  SIRS criteria identified on my evaluation include: Fever, with temperature greater than 101 deg F, Tachycardia, with heart rate greater than 90 BPM and Leukocyosis, with WBC greater than 12,000  Source is endocarditis.  Sepsis protocol initiated  Fluid resuscitation ordered per protocol  IV antibiotics as appropriate for source of sepsis  While organ dysfunction may be noted elsewhere in this problem list or in the chart, degree of organ dysfunction does not meet CMS criteria for severe sepsis  Fu final culture ID  Prn tylenol for fevers.   Decrease IVFs  Plan repeat BCx in am           Dental caries  Assessment & Plan  Likely source of bacteremia.  Advised patient he needs to have the remainder of his teeth removed.    Gout- (present on admission)  Assessment & Plan  Continue allopurinol.    Hypertension- (present on admission)  Assessment & Plan  Continue lisinopril.       VTE prophylaxis: SCDs

## 2020-03-11 NOTE — PROGRESS NOTES
Bedside report received, pt care assumed, tele box on.  Pt is asleep in bed, does not appear to be in distress, breathing s even and unlabored. Bed in lowest position and call light within reach.

## 2020-03-12 PROBLEM — M54.9 BACK PAIN: Status: ACTIVE | Noted: 2020-03-12

## 2020-03-12 LAB
ANION GAP SERPL CALC-SCNC: 16 MMOL/L (ref 7–16)
BACTERIA BLD CULT: ABNORMAL
BACTERIA UR CULT: NORMAL
BASOPHILS # BLD AUTO: 0.4 % (ref 0–1.8)
BASOPHILS # BLD: 0.05 K/UL (ref 0–0.12)
BUN SERPL-MCNC: 12 MG/DL (ref 8–22)
CALCIUM SERPL-MCNC: 8.7 MG/DL (ref 8.4–10.2)
CHLORIDE SERPL-SCNC: 102 MMOL/L (ref 96–112)
CO2 SERPL-SCNC: 21 MMOL/L (ref 20–33)
CREAT SERPL-MCNC: 0.84 MG/DL (ref 0.5–1.4)
EOSINOPHIL # BLD AUTO: 0.19 K/UL (ref 0–0.51)
EOSINOPHIL NFR BLD: 1.6 % (ref 0–6.9)
ERYTHROCYTE [DISTWIDTH] IN BLOOD BY AUTOMATED COUNT: 41.6 FL (ref 35.9–50)
ETEST SENSITIVITY ETEST: NORMAL
GLUCOSE SERPL-MCNC: 108 MG/DL (ref 65–99)
HCT VFR BLD AUTO: 35 % (ref 42–52)
HGB BLD-MCNC: 12 G/DL (ref 14–18)
IMM GRANULOCYTES # BLD AUTO: 0.04 K/UL (ref 0–0.11)
IMM GRANULOCYTES NFR BLD AUTO: 0.3 % (ref 0–0.9)
LYMPHOCYTES # BLD AUTO: 1.99 K/UL (ref 1–4.8)
LYMPHOCYTES NFR BLD: 17.1 % (ref 22–41)
MCH RBC QN AUTO: 32.3 PG (ref 27–33)
MCHC RBC AUTO-ENTMCNC: 34.3 G/DL (ref 33.7–35.3)
MCV RBC AUTO: 94.1 FL (ref 81.4–97.8)
MONOCYTES # BLD AUTO: 1.17 K/UL (ref 0–0.85)
MONOCYTES NFR BLD AUTO: 10.1 % (ref 0–13.4)
NEUTROPHILS # BLD AUTO: 8.2 K/UL (ref 1.82–7.42)
NEUTROPHILS NFR BLD: 70.5 % (ref 44–72)
NRBC # BLD AUTO: 0 K/UL
NRBC BLD-RTO: 0 /100 WBC
PLATELET # BLD AUTO: 262 K/UL (ref 164–446)
PMV BLD AUTO: 9.4 FL (ref 9–12.9)
POTASSIUM SERPL-SCNC: 4.4 MMOL/L (ref 3.6–5.5)
RBC # BLD AUTO: 3.72 M/UL (ref 4.7–6.1)
SIGNIFICANT IND 70042: ABNORMAL
SIGNIFICANT IND 70042: NORMAL
SITE SITE: ABNORMAL
SITE SITE: NORMAL
SODIUM SERPL-SCNC: 139 MMOL/L (ref 135–145)
SOURCE SOURCE: ABNORMAL
SOURCE SOURCE: NORMAL
WBC # BLD AUTO: 11.6 K/UL (ref 4.8–10.8)

## 2020-03-12 PROCEDURE — 700101 HCHG RX REV CODE 250: Performed by: HOSPITALIST

## 2020-03-12 PROCEDURE — 99233 SBSQ HOSP IP/OBS HIGH 50: CPT | Performed by: INTERNAL MEDICINE

## 2020-03-12 PROCEDURE — 700102 HCHG RX REV CODE 250 W/ 637 OVERRIDE(OP): Performed by: HOSPITALIST

## 2020-03-12 PROCEDURE — 87040 BLOOD CULTURE FOR BACTERIA: CPT | Mod: 91

## 2020-03-12 PROCEDURE — 80048 BASIC METABOLIC PNL TOTAL CA: CPT

## 2020-03-12 PROCEDURE — 700105 HCHG RX REV CODE 258: Performed by: HOSPITALIST

## 2020-03-12 PROCEDURE — 85025 COMPLETE CBC W/AUTO DIFF WBC: CPT

## 2020-03-12 PROCEDURE — A9270 NON-COVERED ITEM OR SERVICE: HCPCS | Performed by: HOSPITALIST

## 2020-03-12 PROCEDURE — 36415 COLL VENOUS BLD VENIPUNCTURE: CPT

## 2020-03-12 PROCEDURE — 99233 SBSQ HOSP IP/OBS HIGH 50: CPT | Performed by: HOSPITALIST

## 2020-03-12 PROCEDURE — 700111 HCHG RX REV CODE 636 W/ 250 OVERRIDE (IP): Performed by: HOSPITALIST

## 2020-03-12 PROCEDURE — 770020 HCHG ROOM/CARE - TELE (206)

## 2020-03-12 RX ORDER — LIDOCAINE 50 MG/G
1-2 PATCH TOPICAL EVERY 24 HOURS
Status: DISCONTINUED | OUTPATIENT
Start: 2020-03-12 | End: 2020-03-17 | Stop reason: HOSPADM

## 2020-03-12 RX ORDER — METHOCARBAMOL 500 MG/1
500 TABLET, FILM COATED ORAL 3 TIMES DAILY
Status: DISCONTINUED | OUTPATIENT
Start: 2020-03-12 | End: 2020-03-15

## 2020-03-12 RX ADMIN — CEFTRIAXONE SODIUM 2 G: 2 INJECTION, POWDER, FOR SOLUTION INTRAMUSCULAR; INTRAVENOUS at 17:15

## 2020-03-12 RX ADMIN — ACETAMINOPHEN 1000 MG: 500 TABLET ORAL at 19:24

## 2020-03-12 RX ADMIN — ALLOPURINOL 200 MG: 100 TABLET ORAL at 05:53

## 2020-03-12 RX ADMIN — LISINOPRIL 10 MG: 10 TABLET ORAL at 05:53

## 2020-03-12 RX ADMIN — ACETAMINOPHEN 1000 MG: 500 TABLET ORAL at 11:50

## 2020-03-12 RX ADMIN — SODIUM CHLORIDE, POTASSIUM CHLORIDE, SODIUM LACTATE AND CALCIUM CHLORIDE: 600; 310; 30; 20 INJECTION, SOLUTION INTRAVENOUS at 11:50

## 2020-03-12 RX ADMIN — OXYCODONE HYDROCHLORIDE 10 MG: 10 TABLET ORAL at 16:21

## 2020-03-12 RX ADMIN — OXYCODONE HYDROCHLORIDE 10 MG: 10 TABLET ORAL at 05:53

## 2020-03-12 RX ADMIN — ONDANSETRON 4 MG: 2 INJECTION INTRAMUSCULAR; INTRAVENOUS at 08:13

## 2020-03-12 RX ADMIN — OXYCODONE HYDROCHLORIDE 10 MG: 10 TABLET ORAL at 11:47

## 2020-03-12 RX ADMIN — ALLOPURINOL 200 MG: 100 TABLET ORAL at 17:15

## 2020-03-12 RX ADMIN — ACETAMINOPHEN 1000 MG: 500 TABLET ORAL at 05:53

## 2020-03-12 RX ADMIN — LIDOCAINE 2 PATCH: 50 PATCH TOPICAL at 19:25

## 2020-03-12 RX ADMIN — SENNOSIDES AND DOCUSATE SODIUM 2 TABLET: 8.6; 5 TABLET ORAL at 05:53

## 2020-03-12 RX ADMIN — POLYETHYLENE GLYCOL 3350 1 PACKET: 17 POWDER, FOR SOLUTION ORAL at 17:22

## 2020-03-12 RX ADMIN — METHOCARBAMOL TABLETS 500 MG: 500 TABLET, COATED ORAL at 17:14

## 2020-03-12 ASSESSMENT — ENCOUNTER SYMPTOMS
VOMITING: 0
HEADACHES: 0
BACK PAIN: 1
COUGH: 0
SPEECH CHANGE: 0
WEAKNESS: 0
DIAPHORESIS: 0
FEVER: 1
DIARRHEA: 0
EYE REDNESS: 0
DIZZINESS: 0
ABDOMINAL PAIN: 0
NAUSEA: 0
FLANK PAIN: 1
EYE DISCHARGE: 0
HALLUCINATIONS: 0
WHEEZING: 0
NECK PAIN: 0
SHORTNESS OF BREATH: 0
CONSTIPATION: 1
CHILLS: 1
PALPITATIONS: 0
FOCAL WEAKNESS: 0
STRIDOR: 0

## 2020-03-12 ASSESSMENT — LIFESTYLE VARIABLES: SUBSTANCE_ABUSE: 0

## 2020-03-12 ASSESSMENT — FIBROSIS 4 INDEX: FIB4 SCORE: 1.4

## 2020-03-12 NOTE — PROGRESS NOTES
"Pharmacy Kinetics 54 y.o. male on vancomycin day # 1 3/11/2020    Currently on Vancomycin 1500 mg iv q12hr  Provider specified end date: TBD    Indication for Treatment: Endocarditis/bacteremia    Pertinent history per medical record: Admitted on 3/10/2020 for fevers. Patient has recent history of teeth removal secondary to dental caries - had presented to ED and then discharged, subsequently asked to return due to positive blood cultures. Empiric antibiotic therapy initiated and ID consulted for management.    Other antibiotics: Ceftriaxone 2 g IV q24h    Allergies: Morphine     List concerns for renal function: None    Pertinent cultures to date:     3/9/20 Urine    NGTD  3/10/20 PBC x 2   Preliminary - Streptococcus species  3/10/20 PBC x 2   Preliminary - possible Streptococcus species      Recent Labs     03/10/20  0100 03/10/20  19020  0516   WBC 11.6* 11.6* 11.5*   NEUTSPOLYS 73.20* 72.00 77.30*     Recent Labs     03/10/20  0100 03/10/20  1904 03/11/20  0516   BUN 16 15 12   CREATININE 0.84 0.83 0.80   ALBUMIN 3.7 3.9  --        Intake/Output Summary (Last 24 hours) at 3/11/2020 1748  Last data filed at 3/11/2020 1725  Gross per 24 hour   Intake 840 ml   Output 1050 ml   Net -210 ml      BP (!) 92/59   Pulse (!) 115   Temp 37.4 °C (99.3 °F) (Temporal)   Resp 18   Ht 1.753 m (5' 9\")   Wt 90.6 kg (199 lb 11.8 oz)   SpO2 92%  Temp (24hrs), Av.3 °C (99.1 °F), Min:36.8 °C (98.2 °F), Max:37.8 °C (100.1 °F)      A/P   1. Vancomycin dose change: Continue 1500 mg IV q12h scheduled dosing  2. Next vancomycin level: Trough tomorrow at 1130   3. Goal trough: 16-20 mcg/mL  4. Comments: Therapy with vancomycin continues empirically for bacteremia/endocarditis per ID recommendations. Scheduled dosing started overnight due to limited concerns for accumulation. Renal indices stable. Will continue current scheduled dosing and follow up on trough level when patient closer to steady state tomorrow to ensure " appropriate clearance and drug levels. Pharmacy will continue to follow.     Wellington SantoyoD, BCPS

## 2020-03-12 NOTE — PROGRESS NOTES
Bedside report received, pt care assumed, tele box on.  Pt is A&Ox4, resting in bed, complaining of mild nausea, PRN medication given. Pt denies any additional needs at this time. Bed in lowest position, pt educated on fall risk and verbalized understanding, call light within reach.

## 2020-03-12 NOTE — PROGRESS NOTES
Infectious Disease Progress Note    Author: Katia Talavera M.D. Date & Time of service: 3/12/2020  8:47 AM    Chief Complaint:  Follow-up for gram-positive bacteremia/infective endocarditis    Interval History:  3/12 T-max 101.9 WBC 11. 6 repeat blood cultures pending.  Blood cultures on 3/10 growing Streptococcus.  Patient feels better today.  He states his back pain is also improved.  MRI of the thoracic and lumbar spine without any evidence of infection.      Labs Reviewed, Medications Reviewed and Radiology Reviewed.    Review of Systems:  Review of Systems   Constitutional: Positive for fever.   Respiratory: Negative for cough and shortness of breath.    Gastrointestinal: Positive for constipation. Negative for abdominal pain, diarrhea, nausea and vomiting.   Musculoskeletal: Positive for back pain.        Decreased   Neurological: Negative for dizziness and headaches.   All other systems reviewed and are negative.      Hemodynamics:  Temp (24hrs), Av.7 °C (99.9 °F), Min:36.8 °C (98.3 °F), Max:38.8 °C (101.9 °F)  Temperature: (!) 38.8 °C (101.9 °F)(rn notified)  Pulse  Av.9  Min: 90  Max: 121   Blood Pressure: 119/59       Physical Exam:  Physical Exam  Constitutional:       Appearance: Normal appearance. He is not ill-appearing.   HENT:      Mouth/Throat:      Mouth: Mucous membranes are moist.      Pharynx: No oropharyngeal exudate.      Comments: Upper dentures    Lower teeth dental caries  Eyes:      Extraocular Movements: Extraocular movements intact.      Conjunctiva/sclera: Conjunctivae normal.      Pupils: Pupils are equal, round, and reactive to light.   Neck:      Musculoskeletal: Normal range of motion and neck supple.   Cardiovascular:      Rate and Rhythm: Normal rate.      Heart sounds: Murmur present.   Abdominal:      Palpations: Abdomen is soft.      Tenderness: There is no abdominal tenderness.   Musculoskeletal: Normal range of motion.         General: No swelling.      Right  lower leg: No edema.      Left lower leg: No edema.   Skin:     General: Skin is warm and dry.   Neurological:      General: No focal deficit present.      Mental Status: He is alert and oriented to person, place, and time.      Cranial Nerves: No cranial nerve deficit.   Psychiatric:         Mood and Affect: Mood normal.         Behavior: Behavior normal.      Comments: Pleasant         Meds:    Current Facility-Administered Medications:   •  MD Alert...Vancomycin per Pharmacy  •  allopurinol  •  lisinopril  •  senna-docusate **AND** polyethylene glycol/lytes **AND** magnesium hydroxide **AND** bisacodyl  •  LR  •  LR  •  LR  •  acetaminophen  •  ondansetron  •  ondansetron  •  promethazine  •  promethazine  •  prochlorperazine  •  Notify provider if pain remains uncontrolled **AND** Use the numeric rating scale (NRS-11) on regular floors and Critical-Care Pain Observation Tool (CPOT) on ICUs/Trauma to assess pain **AND** Pulse Ox (Oximetry) **AND** Pharmacy Consult Request **AND** If patient difficult to arouse and/or has respiratory depression, stop any opiates that are currently infusing and call a Rapid Response. **AND** oxyCODONE immediate-release **AND** oxyCODONE immediate-release **AND** HYDROmorphone  •  cefTRIAXone (ROCEPHIN) IV  •  vancomycin    Labs:  Recent Labs     03/10/20  1904 03/11/20  0516 03/12/20  0424   WBC 11.6* 11.5* 11.6*   RBC 3.80* 3.82* 3.72*   HEMOGLOBIN 12.3* 12.5* 12.0*   HEMATOCRIT 35.0* 35.5* 35.0*   MCV 92.1 92.9 94.1   MCH 32.4 32.7 32.3   RDW 40.8 41.3 41.6   PLATELETCT 246 232 262   MPV 9.3 9.5 9.4   NEUTSPOLYS 72.00 77.30* 70.50   LYMPHOCYTES 14.40* 13.10* 17.10*   MONOCYTES 10.60 7.50 10.10   EOSINOPHILS 2.20 1.50 1.60   BASOPHILS 0.50 0.30 0.40     Recent Labs     03/10/20  0100 03/10/20  1904 03/11/20  0516   SODIUM 136 137 134*   POTASSIUM 4.2 4.4 4.3   CHLORIDE 107 107 103   CO2 20 21 24   GLUCOSE 117* 116* 117*   BUN 16 15 12     Recent Labs     03/10/20  0100  03/10/20  1904 03/11/20  0516   ALBUMIN 3.7 3.9  --    TBILIRUBIN 0.5 0.5  --    ALKPHOSPHAT 126* 131*  --    TOTPROTEIN 7.3 7.4  --    ALTSGPT 43 50  --    ASTSGOT 38 49*  --    CREATININE 0.84 0.83 0.80       Imaging:  Dx-chest-2 Views    Result Date: 3/9/2020    3/9/2020 10:57 PM HISTORY/REASON FOR EXAM: COUGH; Cough TECHNIQUE/EXAM DESCRIPTION:  PA and lateral views of the chest. COMPARISON: None FINDINGS: The cardiac silhouette appears within normal limits. The mediastinal contour appears within normal limits.  The central pulmonary vasculature appears normal. The lungs appear well expanded bilaterally.  Bilateral lungs are clear. No significant pleural effusions are identified. The bony structures appear age-appropriate.     1.  No acute cardiopulmonary disease.    Dx-chest-portable (1 View)    Result Date: 3/10/2020  3/10/2020 6:45 PM HISTORY/REASON FOR EXAM:  Sepsis and shortness of breath TECHNIQUE/EXAM DESCRIPTION AND NUMBER OF VIEWS: Single portable view of the chest. COMPARISON: 03/09/2020 FINDINGS: Heart size is within normal limits. No focal infiltrates or consolidations are identified in the lungs. No pleural fluid collections are identified. No pneumothorax is appreciated.     No acute cardiac or pulmonary abnormality is identified.    Mr-lumbar Spine-with & W/o    Result Date: 3/11/2020  3/11/2020 7:41 PM HISTORY/REASON FOR EXAM:  septic emboli. Low back pain. Fevers. Diffuse joint pain. TECHNIQUE/EXAM DESCRIPTION: MRI of the lumbar spine without and with contrast. The study was performed on a [employer] 1.5 Martha MRI scanner. T1 sagittal, T2 fast spin-echo sagittal, and T2 axial images were obtained of the lumbar spine. T1 post-contrast fat-suppressed sagittal images were obtained. Optional T2 fat-suppressed sagittal and T1 post-contrast axial images may be obtained. 20 mL ProHance gadolinium contrast was administered intravenously. COMPARISON:  MRI of thoracic spine from today's date FINDINGS:  Alignment in the lumbar spine shows grade 1-2 spondylolisthesis of L5 on S1. There is bilateral L5 spondylolysis. There is mild degenerative retrolisthesis of L4 on L5. Marrow signal the vertebral bodies shows chronic fatty signal discogenic endplate marrow changes associated with Schmorl's node endplate irregularities at L4-5 and L5-S1. There is no pathologic osseous enhancement. There is no evidence of discitis or osteomyelitis. No evidence of epidural phlegmon or epidural abscess. There is mild degenerative disc space narrowing at L4-5 primarily at the posterior disc space. There is advanced degenerative disc space narrowing at L5-S1. The prevertebral and paraspinous soft tissues are unremarkable. The conus is normal in position and signal with its tip at the L1 level.  There is no abnormal cord enhancement. There is no abnormal intradural extra medullary enhancement. At T12-L1, no abnormality. At L1-2, no abnormality. At L2-3, no significant disc bulge or protrusion. No central or foraminal stenosis. Mild facet prominence. At L3-4, no significant disc bulge or protrusion. No central or foraminal stenosis. At L4-5, minimal disc bulging. Moderate hypertrophic facet arthropathy. No central stenosis. Moderate bilateral foraminal stenosis. At L5-S1, mild pseudo-disc bulging associated with spondylolisthesis. Severe bilateral foraminal stenosis with horizontal deformity of the neural foramina due to spondylolisthesis. Marked flattening of the exiting L5 nerve roots within the neural foramina.     1.  L5-S1 grade 1-2 spondylolisthesis with bilateral L5 spondylolysis. L4-5 slight degenerative retrolisthesis. 2.  L4-5 minimal disc bulging. Moderate hypertrophic facet arthropathy. Moderate bilateral foraminal stenosis. 3.  L5-S1 mild pseudo-disc bulging associated with spondylolisthesis. Severe bilateral foraminal stenosis due to foraminal deformity associated with spondylolisthesis. 4.  No evidence of discitis,  osteomyelitis, or epidural phlegmon/epidural abscess.    Mr-thoracic Spine-with & W/o    Result Date: 3/11/2020  3/11/2020 7:40 PM HISTORY/REASON FOR EXAM:  septic emboli. Worsening low back pain. Fevers. Diffuse joint pain. TECHNIQUE/EXAM DESCRIPTION: MRI of the thoracic spine without and with contrast. The study was performed on a [employer] 1.5 Martha MRI scanner. T1 sagittal, T2 fast spin-echo sagittal, and T2 axial images were obtained of the thoracic spine. T1 post-contrast fat suppressed sagittal images were obtained. Optional T1 post-contrast axial images may be obtained. 20 mL ProHance gadolinium contrast was administered intravenously. COMPARISON:  MRI lumbar spine from today's date FINDINGS: Alignment in the thoracic spine is normal. Marrow signal in the vertebral bodies is normal. There is no abnormal osseous enhancement. No evidence of discitis or osteomyelitis or epidural phlegmon/abscess. The thoracic spinal cord is normal in caliber and signal throughout its course. There is no abnormal cord enhancement. There is no abnormal intradural extra medullary enhancement. At T9-T10, there is a small central disc protrusion with a slight component of cephalad extrusion (T2 axial image 4, series 11, T2 sagittal image 9, series 10). Partial effacement of ventral subarachnoid space without cord deformity or javon central stenosis. Dorsal subarachnoid space remains generous. The neural foramina are widely patent at all thoracic levels.     1.  T9-T10 small central disc protrusion with slight component of cephalad extrusion. No javon central stenosis. 2.  No evidence of discitis, osteomyelitis, or epidural phlegmon/abscess.    Ec-echocardiogram Complete W/o Cont    Result Date: 3/10/2020  Transthoracic Echo Report Echocardiography Laboratory CONCLUSIONS No prior study is available for comparison. Normal left ventricular chamber size. Moderate concentric left ventricular hypertrophy. Left ventricular ejection  fraction is visually estimated to be 70%. Mobile vegetation seen on the anterior mitral valve leaflet. Mild mitral regurgitation. Severe aortic stenosis. Transvalvular gradients are - Peak: 104 mmHg, Mean: 59 mmHg. DEBBIE NIEVES Exam Date:         03/10/2020                    20:57 Exam Location:     Inpatient Priority:          Stat Ordering Physician:        JEFF DAVEY Referring Physician:       [id number] Hasnen Sonographer:               Ana Ho RDCS Age:    54     Gender:    M MRN:    [id number] :    1965 BSA:    2.07   Ht (in):    69     Wt (lb):    202 Exam Type:     Complete Indications:     Acute rheumatic endocarditis, Endocarditis ICD Codes:       I01.1  421 CPT Codes:       94301 BP:   105    /   65     HR:   97 Technical Quality:       Fair MEASUREMENTS  (Male / Female) Normal Values 2D ECHO LV Diastolic Diameter PLAX        3.5 cm                4.2 - 5.9 / 3.9 - 5.3 cm LV Systolic Diameter PLAX         1.7 cm                2.1 - 4.0 cm IVS Diastolic Thickness           1.4 cm                LVPW Diastolic Thickness          1.5 cm                LVOT Diameter                     2.1 cm                Estimated LV Ejection Fraction    70 %                  LV Ejection Fraction MOD BP       70.6 %                >= 55  % LV Ejection Fraction MOD 4C       76.9 %                LV Ejection Fraction MOD 2C       63.7 %                DOPPLER AV Peak Velocity                  4.8 m/s               AV Peak Gradient                  93.6 mmHg             AV Mean Gradient                  52.8 mmHg             LVOT Peak Velocity                1.3 m/s               AV Area Cont Eq vti               1 cm2                 Mitral E Point Velocity           1.1 m/s               Mitral E to A Ratio               1.2                   MV Pressure Half Time             56 ms                 MV Area PHT                       3.9 cm2               MV Deceleration Time              193 ms           "      MR ERO PISA                       0.045 cm2             MR Regurgitant Volume PISA        6.2 cm3               TR Peak Velocity                  292 cm/s              PV Peak Velocity                  1.4 m/s               PV Peak Gradient                  7.3 mmHg              RVOT Peak Velocity                0.76 m/s              * Indicates values subject to auto-interpretation LV EF:  70    % FINDINGS Left Ventricle Normal left ventricular chamber size. Moderate concentric left ventricular hypertrophy. Normal left ventricular systolic function. Left ventricular ejection fraction is visually estimated to be 70%. Normal diastolic function. Right Ventricle The right ventricle was normal in size and function. Right Atrium The right atrium is normal in size.  Normal inferior vena cava size and inspiratory collapse. Left Atrium The left atrium is normal in size.  Left atrial volume index is 32 mL/sq m. Mitral Valve Mobile vegetation seen on the anterior mitral valve leaflet. Mild mitral regurgitation. No mitral stenosis. Aortic Valve Severe aortic stenosis. Vmax is 5.0 m/s. Transvalvular gradients are - Peak: 104 mmHg, Mean: 59 mmHg. Tricuspid Valve Structurally normal tricuspid valve without significant stenosis. Mild tricuspid regurgitation. Estimated right ventricular systolic pressure  is 38 mmHg. Right atrial pressure is estimated to be 3 mmHg. Pulmonic Valve Structurally normal pulmonic valve without significant stenosis or regurgitation. Pericardium Normal pericardium without effusion. Aorta The aortic root is normal. Sebastian Capps M.D. (Electronically Signed) Final Date:     10 March 2020                 22:25      Micro:  Results     Procedure Component Value Units Date/Time    BLOOD CULTURE [[id number]] Collected:  03/12/20 0424    Order Status:  Completed Specimen:  Blood from Peripheral Updated:  03/12/20 0444    Narrative:       Per Hospital Policy: Only change Specimen Src: to \"Line\" " "if  specified by physician order.    BLOOD CULTURE [[id number]] Collected:  03/12/20 0424    Order Status:  Completed Specimen:  Blood from Peripheral Updated:  03/12/20 0444    Narrative:       Per Hospital Policy: Only change Specimen Src: to \"Line\" if  specified by physician order.    BLOOD CULTURE [[id number]]  (Abnormal) Collected:  03/10/20 1904    Order Status:  Completed Specimen:  Blood from Peripheral Updated:  03/11/20 1000     Significant Indicator POS     Source BLD     Site PERIPHERAL     Culture Result Growth detected by Bactec instrument.  03/11/2020  09:55  Gram Stain: Gram positive cocci: Possible Streptococcus sp.      Narrative:       Per Hospital Policy: Only change Specimen Src: to \"Line\" if  specified by physician order.  Right Forearm/Arm    Blood Culture [[id number]]     Order Status:  Sent Specimen:  Blood from Peripheral     Blood Culture [[id number]]     Order Status:  Sent Specimen:  Blood from Peripheral     Urinalysis [[id number]]     Order Status:  Sent Specimen:  Urine     URINALYSIS [[id number]] Collected:  03/10/20 1904    Order Status:  Completed Specimen:  Urine Updated:  03/10/20 2025     Color Yellow     Character Clear     Specific Gravity 1.014     Ph 7.0     Glucose Negative mg/dL      Ketones Negative mg/dL      Protein Negative mg/dL      Bilirubin Negative     Urobilinogen, Urine 1.0     Nitrite Negative     Leukocyte Esterase Negative     Occult Blood Negative     Micro Urine Req see below     Comment: Microscopic examination not performed when specimen is clear  and chemically negative for protein, blood, leukocyte esterase  and nitrite.         Narrative:       Indication for culture:->Septic Shock: Persistent  hypotension,  Lactic acid > 4, vasopressors/inotropes started    URINE CULTURE(NEW) [[id number]] Collected:  03/10/20 1904    Order Status:  Completed Specimen:  Urine Updated:  03/10/20 2020    Narrative:       Indication for culture:->Septic Shock: " Persistent  hypotension,  Lactic acid > 4, vasopressors/inotropes started    BLOOD CULTURE [[id number]]     Order Status:  Sent Specimen:  Blood from Peripheral           Assessment:  Active Hospital Problems    Diagnosis   • *Acute bacterial endocarditis [I33.0]   • Sepsis (HCC) [A41.9]   • Hypertension [I10]   • Gout [M10.9]   • Dental caries [K02.9]       Plan:  Sepsis  Secondary to below  Remains febrile  Leukocytosis    Viridans streptococcal bacteremia-remains bacteremic  Mitral valve infective endocarditis  Oral source  TTE + veg on MV  Blood cultures on 3/10 - Viridans Streptococcus (penicillin intermediate)  Repeat blood cultures today  Discontinue vancomycin today  Continue ceftriaxone 2 g daily  Anticipate a 4-week course of IV antibiotics from date of first negative blood culture  Midline after repeat blood cultures are negative for 48 hours    Back pain, improved  MRI of the thoracic and lumbar spine-negative for infection    Dental caries  Upper teeth extraction 8 to 9 months prior to admission  Patient known to have lower teeth dental caries, however refused extraction at that time  Recommend dental extraction while patient is on antibiotic therapy-however it can be done in outpatient setting    Disposition: Patient prefers home IV antibiotics if feasible    Follow-up in ID clinic    Discussed with internal medicine/Dr Mendieta

## 2020-03-12 NOTE — ASSESSMENT & PLAN NOTE
MRI thoracic and lumbar spine with variable spondylolisthesis, disc protrusion, foraminal stenosis.  No signs of acute infection.  Continue PRN oxycodone, IV Dilaudid  Neuro stable. - Multimodality pain control-cw Lidoderm patch, Duloxetine, Robaxin.   Wean narcotics as corine

## 2020-03-12 NOTE — CARE PLAN
Problem: Safety  Goal: Will remain free from falls  Outcome: PROGRESSING AS EXPECTED  Intervention: Implement fall precautions  Flowsheets (Taken 3/11/2020 2035)  Environmental Precautions:   Treaded Slipper Socks on Patient   Personal Belongings, Wastebasket, Call Bell etc. in Easy Reach   Transferred to Stronger Side   Report Given to Other Health Care Providers Regarding Fall Risk   Bed in Low Position   Communication Sign for Patients & Families   Mobility Assessed & Appropriate Sign Placed     Problem: Infection  Goal: Will remain free from infection  Outcome: PROGRESSING AS EXPECTED  Intervention: Implement standard precautions and perform hand washing before and after patient contact  Note: Hand hygiene performed before and after all patient care.

## 2020-03-12 NOTE — PROGRESS NOTES
[facility] Medicine Daily Progress Note    Date of Service  3/12/2020    Chief Complaint  54 y.o. male admitted 3/10/2020 with fevers.     Hospital Course    Patient with history of upper teeth extractions with dentures placed, lower tooth dental decay with future dental work planned admitted with fever over a month.  Patient was seen in ER on 3/9/2020 and thought to be viral illness.  Admitted following positive blood cultures and findings consistent with endocarditis.    Interval Problem Update    Patient with recurrent fever, .9 .  Has been tachycardic.  Repeat blood cultures pending.  MRI of the thoracic and lumbar spine no acute inflammatory changes.  However, he does have multilevel spondylolisthesis, foraminal stenosis and disc protrusions.    Consultants/Specialty  Dr Talavera ID    Code Status  Full     Disposition    Plan continue antibiotics, to be determined.  Will need prolonged IV antibiotics    Review of Systems  Review of Systems   Constitutional: Positive for chills and fever. Negative for diaphoresis and malaise/fatigue.   HENT: Negative for congestion.    Eyes: Negative for discharge and redness.   Respiratory: Negative for cough, shortness of breath, wheezing and stridor.    Cardiovascular: Negative for chest pain and palpitations.   Gastrointestinal: Negative for abdominal pain, diarrhea and vomiting.   Genitourinary: Positive for flank pain. Negative for hematuria.   Musculoskeletal: Positive for back pain. Negative for joint pain and neck pain.   Neurological: Negative for speech change, focal weakness and weakness.   Psychiatric/Behavioral: Negative for hallucinations and substance abuse.        Physical Exam  Temp:  [36.7 °C (98 °F)-38.8 °C (101.9 °F)] 36.7 °C (98 °F)  Pulse:  [] 104  Resp:  [16-18] 17  BP: (106-141)/(59-80) 111/77  SpO2:  [91 %-95 %] 92 %    Physical Exam  Constitutional:       General: He is not in acute distress.     Appearance: He is obese. He is not diaphoretic.    HENT:      Head: Normocephalic and atraumatic.      Right Ear: External ear normal.      Left Ear: External ear normal.      Nose: Nose normal.   Eyes:      General: No scleral icterus.     Extraocular Movements: Extraocular movements intact.      Conjunctiva/sclera: Conjunctivae normal.   Neck:      Musculoskeletal: Normal range of motion. No neck rigidity.   Cardiovascular:      Rate and Rhythm: Normal rate and regular rhythm.      Heart sounds: Murmur (3/6 high-pitched systolic ejection murmur) present.   Pulmonary:      Breath sounds: No stridor. No wheezing, rhonchi or rales.   Abdominal:      General: There is no distension.      Palpations: Abdomen is soft.      Tenderness: There is no abdominal tenderness.   Musculoskeletal:         General: Tenderness (No tenderness to palpation spinal/paraspinal/flank region) present. No swelling.   Skin:     General: Skin is dry.   Neurological:      General: No focal deficit present.      Mental Status: He is alert and oriented to person, place, and time. Mental status is at baseline.   Psychiatric:         Mood and Affect: Mood normal.         Behavior: Behavior normal.         Fluids    Intake/Output Summary (Last 24 hours) at 3/12/2020 1653  Last data filed at 3/12/2020 1400  Gross per 24 hour   Intake 980 ml   Output --   Net 980 ml       Laboratory  Recent Labs     03/10/20  1904 03/11/20  0516 03/12/20  0424   WBC 11.6* 11.5* 11.6*   RBC 3.80* 3.82* 3.72*   HEMOGLOBIN 12.3* 12.5* 12.0*   HEMATOCRIT 35.0* 35.5* 35.0*   MCV 92.1 92.9 94.1   MCH 32.4 32.7 32.3   MCHC 35.1 35.2 34.3   RDW 40.8 41.3 41.6   PLATELETCT 246 232 262   MPV 9.3 9.5 9.4     Recent Labs     03/10/20  1904 03/11/20  0516 03/12/20  0424   SODIUM 137 134* 139   POTASSIUM 4.4 4.3 4.4   CHLORIDE 107 103 102   CO2 21 24 21   GLUCOSE 116* 117* 108*   BUN 15 12 12   CREATININE 0.83 0.80 0.84   CALCIUM 9.5 9.1 8.7     Recent Labs     03/10/20  1904   INR 1.13               Imaging  MR-LUMBAR  SPINE-WITH & W/O   Final Result      1.  L5-S1 grade 1-2 spondylolisthesis with bilateral L5 spondylolysis. L4-5 slight degenerative retrolisthesis.   2.  L4-5 minimal disc bulging. Moderate hypertrophic facet arthropathy. Moderate bilateral foraminal stenosis.   3.  L5-S1 mild pseudo-disc bulging associated with spondylolisthesis. Severe bilateral foraminal stenosis due to foraminal deformity associated with spondylolisthesis.   4.  No evidence of discitis, osteomyelitis, or epidural phlegmon/epidural abscess.      MR-THORACIC SPINE-WITH & W/O   Final Result      1.  T9-T10 small central disc protrusion with slight component of cephalad extrusion. No javon central stenosis.   2.  No evidence of discitis, osteomyelitis, or epidural phlegmon/abscess.      EC-ECHOCARDIOGRAM COMPLETE W/O CONT   Final Result      DX-CHEST-PORTABLE (1 VIEW)   Final Result         No acute cardiac or pulmonary abnormality is identified.           Assessment/Plan  * Acute bacterial endocarditis- (present on admission)  Assessment & Plan  Blood cultures growing strep species. Anterior  Mitral valve vegetation.  S. Virdans from dental infection.   Patient with back pain however MRI of the thoracic and lumbar spine without acute inflammatory changes or signs of infection.  Recommend removal of the remainder of his teeth as an outpatient.  Monitor for signs of acute heart failure.  Telemetry monitoring  Antibiotics de-escalate to IV Rocephin for streptococcal coverage.  Case discussed with ID .   Follow-up repeat blood cultures.  If remain negative plan for PICC line placement.  I have updated  regarding need for outpatient IV antibiotics set up    Sepsis (HCC)- (present on admission)  Assessment & Plan  This is Sepsis Present on admission  SIRS criteria identified on my evaluation include: Fever, with temperature greater than 101 deg F, Tachycardia, with heart rate greater than 90 BPM and Leukocyosis, with WBC greater  than 12,000  Source is endocarditis.  Sepsis protocol initiated  Fluid resuscitation ordered per protocol  IV antibiotics as appropriate for source of sepsis  While organ dysfunction may be noted elsewhere in this problem list or in the chart, degree of organ dysfunction does not meet CMS criteria for severe sepsis  Streptococcal infection.  Suspect odontogenic source.  Continue with Prn tylenol for fevers.   Follow-up repeat blood cultures.          Back pain  Assessment & Plan  MRI thoracic and lumbar spine with variable spondylolisthesis, disc protrusion, foraminal stenosis.  No signs of acute infection.  Continue PRN oxycodone, IV Dilaudid  Multimodality pain control-add Lidoderm patch, scheduled Robaxin, PRN NSAIDs.  Try to reduce reliance on opiates    Dental caries  Assessment & Plan  Likely source of bacteremia.  Advised patient he needs to have the remainder of his teeth removed.    Gout- (present on admission)  Assessment & Plan  Continue allopurinol.    Hypertension- (present on admission)  Assessment & Plan  Continue lisinopril.       VTE prophylaxis: SCDs    I discussed with multidisciplinary team plan of care.

## 2020-03-13 LAB
BACTERIA UR CULT: NORMAL
BASOPHILS # BLD AUTO: 0.6 % (ref 0–1.8)
BASOPHILS # BLD: 0.07 K/UL (ref 0–0.12)
EOSINOPHIL # BLD AUTO: 0.35 K/UL (ref 0–0.51)
EOSINOPHIL NFR BLD: 3 % (ref 0–6.9)
ERYTHROCYTE [DISTWIDTH] IN BLOOD BY AUTOMATED COUNT: 41.9 FL (ref 35.9–50)
HCT VFR BLD AUTO: 33.4 % (ref 42–52)
HGB BLD-MCNC: 11.2 G/DL (ref 14–18)
IMM GRANULOCYTES # BLD AUTO: 0.06 K/UL (ref 0–0.11)
IMM GRANULOCYTES NFR BLD AUTO: 0.5 % (ref 0–0.9)
LYMPHOCYTES # BLD AUTO: 2.33 K/UL (ref 1–4.8)
LYMPHOCYTES NFR BLD: 19.6 % (ref 22–41)
MCH RBC QN AUTO: 32.4 PG (ref 27–33)
MCHC RBC AUTO-ENTMCNC: 33.5 G/DL (ref 33.7–35.3)
MCV RBC AUTO: 96.5 FL (ref 81.4–97.8)
MONOCYTES # BLD AUTO: 1.15 K/UL (ref 0–0.85)
MONOCYTES NFR BLD AUTO: 9.7 % (ref 0–13.4)
NEUTROPHILS # BLD AUTO: 7.9 K/UL (ref 1.82–7.42)
NEUTROPHILS NFR BLD: 66.6 % (ref 44–72)
NRBC # BLD AUTO: 0 K/UL
NRBC BLD-RTO: 0 /100 WBC
PLATELET # BLD AUTO: 262 K/UL (ref 164–446)
PMV BLD AUTO: 9.4 FL (ref 9–12.9)
RBC # BLD AUTO: 3.46 M/UL (ref 4.7–6.1)
SIGNIFICANT IND 70042: NORMAL
SITE SITE: NORMAL
SOURCE SOURCE: NORMAL
WBC # BLD AUTO: 11.9 K/UL (ref 4.8–10.8)

## 2020-03-13 PROCEDURE — 85025 COMPLETE CBC W/AUTO DIFF WBC: CPT

## 2020-03-13 PROCEDURE — A9270 NON-COVERED ITEM OR SERVICE: HCPCS | Performed by: HOSPITALIST

## 2020-03-13 PROCEDURE — 700102 HCHG RX REV CODE 250 W/ 637 OVERRIDE(OP): Performed by: HOSPITALIST

## 2020-03-13 PROCEDURE — 99232 SBSQ HOSP IP/OBS MODERATE 35: CPT | Performed by: HOSPITALIST

## 2020-03-13 PROCEDURE — 700105 HCHG RX REV CODE 258: Performed by: HOSPITALIST

## 2020-03-13 PROCEDURE — 99233 SBSQ HOSP IP/OBS HIGH 50: CPT | Performed by: INTERNAL MEDICINE

## 2020-03-13 PROCEDURE — 770020 HCHG ROOM/CARE - TELE (206)

## 2020-03-13 PROCEDURE — 700111 HCHG RX REV CODE 636 W/ 250 OVERRIDE (IP): Performed by: HOSPITALIST

## 2020-03-13 PROCEDURE — 36415 COLL VENOUS BLD VENIPUNCTURE: CPT

## 2020-03-13 PROCEDURE — 700101 HCHG RX REV CODE 250: Performed by: HOSPITALIST

## 2020-03-13 RX ORDER — OXYCODONE HYDROCHLORIDE 5 MG/1
5-10 TABLET ORAL EVERY 6 HOURS PRN
Status: DISCONTINUED | OUTPATIENT
Start: 2020-03-13 | End: 2020-03-17 | Stop reason: HOSPADM

## 2020-03-13 RX ORDER — 0.9 % SODIUM CHLORIDE 0.9 %
10-20 VIAL (ML) INJECTION PRN
Status: CANCELLED | OUTPATIENT
Start: 2020-03-13

## 2020-03-13 RX ORDER — OXYCODONE HYDROCHLORIDE 5 MG/1
5-10 TABLET ORAL EVERY 6 HOURS PRN
Status: DISCONTINUED | OUTPATIENT
Start: 2020-03-13 | End: 2020-03-13

## 2020-03-13 RX ORDER — 0.9 % SODIUM CHLORIDE 0.9 %
5 VIAL (ML) INJECTION PRN
Status: CANCELLED | OUTPATIENT
Start: 2020-03-13

## 2020-03-13 RX ORDER — SODIUM CHLORIDE 9 MG/ML
INJECTION, SOLUTION INTRAVENOUS
Status: ACTIVE
Start: 2020-03-13 | End: 2020-03-14

## 2020-03-13 RX ADMIN — METHOCARBAMOL TABLETS 500 MG: 500 TABLET, COATED ORAL at 17:01

## 2020-03-13 RX ADMIN — ACETAMINOPHEN 1000 MG: 500 TABLET ORAL at 17:16

## 2020-03-13 RX ADMIN — CEFTRIAXONE SODIUM 2 G: 2 INJECTION, POWDER, FOR SOLUTION INTRAMUSCULAR; INTRAVENOUS at 17:01

## 2020-03-13 RX ADMIN — SENNOSIDES AND DOCUSATE SODIUM 2 TABLET: 8.6; 5 TABLET ORAL at 06:12

## 2020-03-13 RX ADMIN — OXYCODONE HYDROCHLORIDE 10 MG: 10 TABLET ORAL at 04:10

## 2020-03-13 RX ADMIN — OXYCODONE HYDROCHLORIDE 10 MG: 10 TABLET ORAL at 16:07

## 2020-03-13 RX ADMIN — OXYCODONE HYDROCHLORIDE 10 MG: 5 TABLET ORAL at 23:15

## 2020-03-13 RX ADMIN — OXYCODONE HYDROCHLORIDE 10 MG: 10 TABLET ORAL at 10:49

## 2020-03-13 RX ADMIN — SENNOSIDES AND DOCUSATE SODIUM 2 TABLET: 8.6; 5 TABLET ORAL at 17:01

## 2020-03-13 RX ADMIN — ALLOPURINOL 200 MG: 100 TABLET ORAL at 06:12

## 2020-03-13 RX ADMIN — LIDOCAINE 2 PATCH: 50 PATCH TOPICAL at 17:01

## 2020-03-13 RX ADMIN — ACETAMINOPHEN 1000 MG: 500 TABLET ORAL at 23:15

## 2020-03-13 RX ADMIN — ACETAMINOPHEN 1000 MG: 500 TABLET ORAL at 04:09

## 2020-03-13 RX ADMIN — SODIUM CHLORIDE, POTASSIUM CHLORIDE, SODIUM LACTATE AND CALCIUM CHLORIDE: 600; 310; 30; 20 INJECTION, SOLUTION INTRAVENOUS at 04:10

## 2020-03-13 RX ADMIN — ALLOPURINOL 200 MG: 100 TABLET ORAL at 17:01

## 2020-03-13 RX ADMIN — METHOCARBAMOL TABLETS 500 MG: 500 TABLET, COATED ORAL at 10:49

## 2020-03-13 RX ADMIN — SODIUM CHLORIDE, POTASSIUM CHLORIDE, SODIUM LACTATE AND CALCIUM CHLORIDE: 600; 310; 30; 20 INJECTION, SOLUTION INTRAVENOUS at 00:16

## 2020-03-13 RX ADMIN — POLYETHYLENE GLYCOL 3350 1 PACKET: 17 POWDER, FOR SOLUTION ORAL at 06:13

## 2020-03-13 RX ADMIN — MAGNESIUM HYDROXIDE 30 ML: 400 SUSPENSION ORAL at 10:49

## 2020-03-13 RX ADMIN — METHOCARBAMOL TABLETS 500 MG: 500 TABLET, COATED ORAL at 06:12

## 2020-03-13 RX ADMIN — ONDANSETRON 4 MG: 2 INJECTION INTRAMUSCULAR; INTRAVENOUS at 06:40

## 2020-03-13 ASSESSMENT — ENCOUNTER SYMPTOMS
HEADACHES: 0
CONSTIPATION: 1
PALPITATIONS: 0
ABDOMINAL PAIN: 0
COUGH: 0
DIAPHORESIS: 0
DIARRHEA: 0
DIZZINESS: 0
FOCAL WEAKNESS: 0
SPEECH CHANGE: 0
NAUSEA: 0
EYE REDNESS: 0
SHORTNESS OF BREATH: 0
BACK PAIN: 1
EYE DISCHARGE: 0
NECK PAIN: 0
WEAKNESS: 0
VOMITING: 0
HALLUCINATIONS: 0
FEVER: 1
WHEEZING: 0
STRIDOR: 0
FLANK PAIN: 0
CHILLS: 1
NERVOUS/ANXIOUS: 1

## 2020-03-13 ASSESSMENT — LIFESTYLE VARIABLES: SUBSTANCE_ABUSE: 0

## 2020-03-13 ASSESSMENT — FIBROSIS 4 INDEX: FIB4 SCORE: 1.4

## 2020-03-13 NOTE — CARE PLAN
Problem: Communication  Goal: The ability to communicate needs accurately and effectively will improve  Outcome: PROGRESSING AS EXPECTED     Problem: Knowledge Deficit  Goal: Knowledge of disease process/condition, treatment plan, diagnostic tests, and medications will improve  Outcome: PROGRESSING AS EXPECTED     Problem: Pain Management  Goal: Pain level will decrease to patient's comfort goal  Outcome: PROGRESSING AS EXPECTED

## 2020-03-13 NOTE — PROGRESS NOTES
[facility] Medicine Daily Progress Note    Date of Service  3/13/2020    Chief Complaint  54 y.o. male admitted 3/10/2020 with fevers.     Hospital Course    Patient with history of upper teeth extractions with dentures placed, lower tooth dental decay with future dental work planned admitted with fever over a month.  Patient was seen in ER on 3/9/2020 and thought to be viral illness.  Admitted following positive blood cultures and findings consistent with endocarditis.    Interval Problem Update    Repeat blood cultures from 3-12 NGTD.  Low grade temps Tm 100. Back pain symptoms improved.     Consultants/Specialty  Dr Talavera ID    Code Status  Full     Disposition    Plan continue antibiotics, to be determined.  Will need prolonged IV antibiotics    Review of Systems  Review of Systems   Constitutional: Positive for chills and fever. Negative for diaphoresis and malaise/fatigue.   HENT: Negative for congestion.    Eyes: Negative for discharge and redness.   Respiratory: Negative for cough, shortness of breath, wheezing and stridor.    Cardiovascular: Negative for chest pain and palpitations.   Gastrointestinal: Negative for abdominal pain, diarrhea and vomiting.   Genitourinary: Negative for flank pain and hematuria.   Musculoskeletal: Positive for back pain. Negative for joint pain and neck pain.        Improved    Neurological: Negative for speech change, focal weakness and weakness.   Psychiatric/Behavioral: Negative for hallucinations and substance abuse. The patient is nervous/anxious.         Physical Exam  Temp:  [36.7 °C (98 °F)-37.8 °C (100.1 °F)] 36.8 °C (98.2 °F)  Pulse:  [] 98  Resp:  [16-18] 18  BP: (108-159)/(63-84) 110/63  SpO2:  [90 %-92 %] 92 %    Physical Exam  Constitutional:       General: He is not in acute distress.     Appearance: He is obese. He is not diaphoretic.   HENT:      Head: Normocephalic and atraumatic.      Right Ear: External ear normal.      Left Ear: External ear normal.       Nose: Nose normal.   Eyes:      General: No scleral icterus.     Extraocular Movements: Extraocular movements intact.      Conjunctiva/sclera: Conjunctivae normal.   Neck:      Musculoskeletal: Normal range of motion. No neck rigidity.   Cardiovascular:      Rate and Rhythm: Normal rate and regular rhythm.      Heart sounds: Murmur (3/6 high-pitched systolic ejection murmur) present.   Pulmonary:      Breath sounds: No stridor. No wheezing, rhonchi or rales.   Abdominal:      General: There is no distension.      Palpations: Abdomen is soft.      Tenderness: There is no abdominal tenderness.   Musculoskeletal:         General: Tenderness (No tenderness to palpation spinal/paraspinal/flank region) present. No swelling.   Skin:     General: Skin is dry.   Neurological:      General: No focal deficit present.      Mental Status: He is alert and oriented to person, place, and time. Mental status is at baseline.      Sensory: No sensory deficit.      Comments: Gait nl .   Psychiatric:         Mood and Affect: Mood normal.         Behavior: Behavior normal.         Fluids    Intake/Output Summary (Last 24 hours) at 3/13/2020 0849  Last data filed at 3/12/2020 1800  Gross per 24 hour   Intake 480 ml   Output --   Net 480 ml       Laboratory  Recent Labs     03/11/20  0516 03/12/20  0424 03/13/20  0446   WBC 11.5* 11.6* 11.9*   RBC 3.82* 3.72* 3.46*   HEMOGLOBIN 12.5* 12.0* 11.2*   HEMATOCRIT 35.5* 35.0* 33.4*   MCV 92.9 94.1 96.5   MCH 32.7 32.3 32.4   MCHC 35.2 34.3 33.5*   RDW 41.3 41.6 41.9   PLATELETCT 232 262 262   MPV 9.5 9.4 9.4     Recent Labs     03/10/20  1904 03/11/20  0516 03/12/20  0424   SODIUM 137 134* 139   POTASSIUM 4.4 4.3 4.4   CHLORIDE 107 103 102   CO2 21 24 21   GLUCOSE 116* 117* 108*   BUN 15 12 12   CREATININE 0.83 0.80 0.84   CALCIUM 9.5 9.1 8.7     Recent Labs     03/10/20  1904   INR 1.13               Imaging  MR-LUMBAR SPINE-WITH & W/O   Final Result      1.  L5-S1 grade 1-2 spondylolisthesis  with bilateral L5 spondylolysis. L4-5 slight degenerative retrolisthesis.   2.  L4-5 minimal disc bulging. Moderate hypertrophic facet arthropathy. Moderate bilateral foraminal stenosis.   3.  L5-S1 mild pseudo-disc bulging associated with spondylolisthesis. Severe bilateral foraminal stenosis due to foraminal deformity associated with spondylolisthesis.   4.  No evidence of discitis, osteomyelitis, or epidural phlegmon/epidural abscess.      MR-THORACIC SPINE-WITH & W/O   Final Result      1.  T9-T10 small central disc protrusion with slight component of cephalad extrusion. No javon central stenosis.   2.  No evidence of discitis, osteomyelitis, or epidural phlegmon/abscess.      EC-ECHOCARDIOGRAM COMPLETE W/O CONT   Final Result      DX-CHEST-PORTABLE (1 VIEW)   Final Result         No acute cardiac or pulmonary abnormality is identified.           Assessment/Plan  * Acute bacterial endocarditis- (present on admission)  Assessment & Plan  Blood cultures growing strep species. Anterior  Mitral valve vegetation.  S. Virdans from dental infection.   Patient with back pain however MRI of the thoracic and lumbar spine without acute inflammatory changes or signs of infection.  Recommend removal of the remainder of his teeth as an outpatient.  Monitor for signs of acute heart failure.  Telemetry monitoring  Continue  IV Rocephin for S Viridans coverage. Plan 6 wks course.   Follow-up repeat blood culture final results Plan for PICC if negative  Pursue BEATRIZ if persistent fevers to eval for mitral valve abscess. Discussed with Dr. Talavera, ID    Sepsis ([facility])- (present on admission)  Assessment & Plan  This is Sepsis Present on admission  SIRS criteria identified on my evaluation include: Fever, with temperature greater than 101 deg F, Tachycardia, with heart rate greater than 90 BPM and Leukocyosis, with WBC greater than 12,000  Source is endocarditis.  Sepsis protocol initiated  Fluid resuscitation ordered per protocol  IV  antibiotics as appropriate for source of sepsis  While organ dysfunction may be noted elsewhere in this problem list or in the chart, degree of organ dysfunction does not meet CMS criteria for severe sepsis  S Viridans bacteremia-  Suspect odontogenic source.  Continue with Prn tylenol for fevers.   Follow-up repeat blood cultures.  IV Rocephin - plan PICC if BCx final results negative.             Back pain  Assessment & Plan  MRI thoracic and lumbar spine with variable spondylolisthesis, disc protrusion, foraminal stenosis.  No signs of acute infection.  Continue PRN oxycodone, IV Dilaudid  Multimodality pain control-cw Lidoderm patch, scheduled Robaxin, PRN NSAIDs.  Wean narcotics as corine    Dental caries  Assessment & Plan  Likely source of bacteremia.  Advised patient he needs to have the remainder of his teeth removed.    Gout- (present on admission)  Assessment & Plan  Continue allopurinol.    Hypertension- (present on admission)  Assessment & Plan  Continue lisinopril.       VTE prophylaxis: SCDs    I discussed with multidisciplinary team plan of care.

## 2020-03-13 NOTE — PROGRESS NOTES
Assumed care of patient. Pt is A+O x4 with 2/10 pain, tolerable. POC discussed with pt and verbalizes no questions. Pt denies any additional needs at this time. Bed in lowest position, call light within reach, hourly rounding initiated.

## 2020-03-13 NOTE — PROGRESS NOTES
Infectious Disease Progress Note    Author: Katia Talavera M.D. Date & Time of service: 3/13/2020  8:36 AM    Chief Complaint:  Follow-up for gram-positive bacteremia/infective endocarditis    Interval History:  3/12 T-max 101.9 WBC 11. 6 repeat blood cultures pending.  Blood cultures on 3/10 growing Streptococcus.  Patient feels better today.  He states his back pain is also improved.  MRI of the thoracic and lumbar spine without any evidence of infection.  3/13 T-max 100.1 WBC 11.9 continues to have low-grade fevers.  He did wake up with night sweats.  However he overall feels well with decreased back pain.  Remains constipated    Labs Reviewed, Medications Reviewed and Radiology Reviewed.    Review of Systems:  Review of Systems   Constitutional: Positive for fever.   Respiratory: Negative for cough and shortness of breath.    Gastrointestinal: Positive for constipation. Negative for abdominal pain, diarrhea, nausea and vomiting.   Musculoskeletal: Positive for back pain.        Decreased   Neurological: Negative for dizziness and headaches.   All other systems reviewed and are negative.      Hemodynamics:  Temp (24hrs), Av.2 °C (98.9 °F), Min:36.7 °C (98 °F), Max:37.8 °C (100.1 °F)  Temperature: 36.8 °C (98.2 °F)  Pulse  Av.7  Min: 90  Max: 121   Blood Pressure: 110/63       Physical Exam:  Physical Exam  Constitutional:       Appearance: Normal appearance. He is not ill-appearing.   HENT:      Mouth/Throat:      Mouth: Mucous membranes are moist.      Pharynx: No oropharyngeal exudate.      Comments: Upper dentures    Lower teeth dental caries  Eyes:      Extraocular Movements: Extraocular movements intact.      Conjunctiva/sclera: Conjunctivae normal.      Pupils: Pupils are equal, round, and reactive to light.   Neck:      Musculoskeletal: Normal range of motion and neck supple.   Cardiovascular:      Rate and Rhythm: Normal rate.      Heart sounds: Murmur present.   Abdominal:      Palpations:  Abdomen is soft.      Tenderness: There is no abdominal tenderness.   Musculoskeletal: Normal range of motion.         General: No swelling.      Right lower leg: No edema.      Left lower leg: No edema.   Skin:     General: Skin is warm and dry.   Neurological:      General: No focal deficit present.      Mental Status: He is alert and oriented to person, place, and time.      Cranial Nerves: No cranial nerve deficit.   Psychiatric:         Mood and Affect: Mood normal.         Behavior: Behavior normal.      Comments: Pleasant         Meds:    Current Facility-Administered Medications:   •  methocarbamol  •  lidocaine  •  allopurinol  •  lisinopril  •  senna-docusate **AND** polyethylene glycol/lytes **AND** magnesium hydroxide **AND** bisacodyl  •  LR  •  LR  •  LR  •  acetaminophen  •  ondansetron  •  ondansetron  •  promethazine  •  promethazine  •  prochlorperazine  •  Notify provider if pain remains uncontrolled **AND** Use the numeric rating scale (NRS-11) on regular floors and Critical-Care Pain Observation Tool (CPOT) on ICUs/Trauma to assess pain **AND** Pulse Ox (Oximetry) **AND** Pharmacy Consult Request **AND** If patient difficult to arouse and/or has respiratory depression, stop any opiates that are currently infusing and call a Rapid Response. **AND** oxyCODONE immediate-release **AND** oxyCODONE immediate-release **AND** HYDROmorphone  •  cefTRIAXone (ROCEPHIN) IV    Labs:  Recent Labs     03/11/20  0516 03/12/20  0424 03/13/20  0446   WBC 11.5* 11.6* 11.9*   RBC 3.82* 3.72* 3.46*   HEMOGLOBIN 12.5* 12.0* 11.2*   HEMATOCRIT 35.5* 35.0* 33.4*   MCV 92.9 94.1 96.5   MCH 32.7 32.3 32.4   RDW 41.3 41.6 41.9   PLATELETCT 232 262 262   MPV 9.5 9.4 9.4   NEUTSPOLYS 77.30* 70.50 66.60   LYMPHOCYTES 13.10* 17.10* 19.60*   MONOCYTES 7.50 10.10 9.70   EOSINOPHILS 1.50 1.60 3.00   BASOPHILS 0.30 0.40 0.60     Recent Labs     03/10/20  1904 03/11/20  0516 03/12/20  0424   SODIUM 137 134* 139   POTASSIUM 4.4 4.3  4.4   CHLORIDE 107 103 102   CO2 21 24 21   GLUCOSE 116* 117* 108*   BUN 15 12 12     Recent Labs     03/10/20  1904 03/11/20  0516 03/12/20  0424   ALBUMIN 3.9  --   --    TBILIRUBIN 0.5  --   --    ALKPHOSPHAT 131*  --   --    TOTPROTEIN 7.4  --   --    ALTSGPT 50  --   --    ASTSGOT 49*  --   --    CREATININE 0.83 0.80 0.84       Imaging:  Dx-chest-2 Views    Result Date: 3/9/2020    3/9/2020 10:57 PM HISTORY/REASON FOR EXAM: COUGH; Cough TECHNIQUE/EXAM DESCRIPTION:  PA and lateral views of the chest. COMPARISON: None FINDINGS: The cardiac silhouette appears within normal limits. The mediastinal contour appears within normal limits.  The central pulmonary vasculature appears normal. The lungs appear well expanded bilaterally.  Bilateral lungs are clear. No significant pleural effusions are identified. The bony structures appear age-appropriate.     1.  No acute cardiopulmonary disease.    Dx-chest-portable (1 View)    Result Date: 3/10/2020  3/10/2020 6:45 PM HISTORY/REASON FOR EXAM:  Sepsis and shortness of breath TECHNIQUE/EXAM DESCRIPTION AND NUMBER OF VIEWS: Single portable view of the chest. COMPARISON: 03/09/2020 FINDINGS: Heart size is within normal limits. No focal infiltrates or consolidations are identified in the lungs. No pleural fluid collections are identified. No pneumothorax is appreciated.     No acute cardiac or pulmonary abnormality is identified.    Mr-lumbar Spine-with & W/o    Result Date: 3/11/2020  3/11/2020 7:41 PM HISTORY/REASON FOR EXAM:  septic emboli. Low back pain. Fevers. Diffuse joint pain. TECHNIQUE/EXAM DESCRIPTION: MRI of the lumbar spine without and with contrast. The study was performed on a [employer] 1.5 Martha MRI scanner. T1 sagittal, T2 fast spin-echo sagittal, and T2 axial images were obtained of the lumbar spine. T1 post-contrast fat-suppressed sagittal images were obtained. Optional T2 fat-suppressed sagittal and T1 post-contrast axial images may be obtained. 20 mL  ProHance gadolinium contrast was administered intravenously. COMPARISON:  MRI of thoracic spine from today's date FINDINGS: Alignment in the lumbar spine shows grade 1-2 spondylolisthesis of L5 on S1. There is bilateral L5 spondylolysis. There is mild degenerative retrolisthesis of L4 on L5. Marrow signal the vertebral bodies shows chronic fatty signal discogenic endplate marrow changes associated with Schmorl's node endplate irregularities at L4-5 and L5-S1. There is no pathologic osseous enhancement. There is no evidence of discitis or osteomyelitis. No evidence of epidural phlegmon or epidural abscess. There is mild degenerative disc space narrowing at L4-5 primarily at the posterior disc space. There is advanced degenerative disc space narrowing at L5-S1. The prevertebral and paraspinous soft tissues are unremarkable. The conus is normal in position and signal with its tip at the L1 level.  There is no abnormal cord enhancement. There is no abnormal intradural extra medullary enhancement. At T12-L1, no abnormality. At L1-2, no abnormality. At L2-3, no significant disc bulge or protrusion. No central or foraminal stenosis. Mild facet prominence. At L3-4, no significant disc bulge or protrusion. No central or foraminal stenosis. At L4-5, minimal disc bulging. Moderate hypertrophic facet arthropathy. No central stenosis. Moderate bilateral foraminal stenosis. At L5-S1, mild pseudo-disc bulging associated with spondylolisthesis. Severe bilateral foraminal stenosis with horizontal deformity of the neural foramina due to spondylolisthesis. Marked flattening of the exiting L5 nerve roots within the neural foramina.     1.  L5-S1 grade 1-2 spondylolisthesis with bilateral L5 spondylolysis. L4-5 slight degenerative retrolisthesis. 2.  L4-5 minimal disc bulging. Moderate hypertrophic facet arthropathy. Moderate bilateral foraminal stenosis. 3.  L5-S1 mild pseudo-disc bulging associated with spondylolisthesis. Severe  bilateral foraminal stenosis due to foraminal deformity associated with spondylolisthesis. 4.  No evidence of discitis, osteomyelitis, or epidural phlegmon/epidural abscess.    Mr-thoracic Spine-with & W/o    Result Date: 3/11/2020  3/11/2020 7:40 PM HISTORY/REASON FOR EXAM:  septic emboli. Worsening low back pain. Fevers. Diffuse joint pain. TECHNIQUE/EXAM DESCRIPTION: MRI of the thoracic spine without and with contrast. The study was performed on a [employer] 1.5 Martha MRI scanner. T1 sagittal, T2 fast spin-echo sagittal, and T2 axial images were obtained of the thoracic spine. T1 post-contrast fat suppressed sagittal images were obtained. Optional T1 post-contrast axial images may be obtained. 20 mL ProHance gadolinium contrast was administered intravenously. COMPARISON:  MRI lumbar spine from today's date FINDINGS: Alignment in the thoracic spine is normal. Marrow signal in the vertebral bodies is normal. There is no abnormal osseous enhancement. No evidence of discitis or osteomyelitis or epidural phlegmon/abscess. The thoracic spinal cord is normal in caliber and signal throughout its course. There is no abnormal cord enhancement. There is no abnormal intradural extra medullary enhancement. At T9-T10, there is a small central disc protrusion with a slight component of cephalad extrusion (T2 axial image 4, series 11, T2 sagittal image 9, series 10). Partial effacement of ventral subarachnoid space without cord deformity or javon central stenosis. Dorsal subarachnoid space remains generous. The neural foramina are widely patent at all thoracic levels.     1.  T9-T10 small central disc protrusion with slight component of cephalad extrusion. No javon central stenosis. 2.  No evidence of discitis, osteomyelitis, or epidural phlegmon/abscess.    Ec-echocardiogram Complete W/o Cont    Result Date: 3/10/2020  Transthoracic Echo Report Echocardiography Laboratory CONCLUSIONS No prior study is available for comparison.  Normal left ventricular chamber size. Moderate concentric left ventricular hypertrophy. Left ventricular ejection fraction is visually estimated to be 70%. Mobile vegetation seen on the anterior mitral valve leaflet. Mild mitral regurgitation. Severe aortic stenosis. Transvalvular gradients are - Peak: 104 mmHg, Mean: 59 mmHg. DEBBIE NIEVES Exam Date:         03/10/2020                    20:57 Exam Location:     Inpatient Priority:          Stat Ordering Physician:        EJFF DAVEY Referring Physician:       [id number] Hansen Sonographer:               Ana Ho RDCS Age:    54     Gender:    M MRN:    [id number] :    1965 BSA:    2.07   Ht (in):    69     Wt (lb):    202 Exam Type:     Complete Indications:     Acute rheumatic endocarditis, Endocarditis ICD Codes:       I01.1  421 CPT Codes:       05891 BP:   105    /   65     HR:   97 Technical Quality:       Fair MEASUREMENTS  (Male / Female) Normal Values 2D ECHO LV Diastolic Diameter PLAX        3.5 cm                4.2 - 5.9 / 3.9 - 5.3 cm LV Systolic Diameter PLAX         1.7 cm                2.1 - 4.0 cm IVS Diastolic Thickness           1.4 cm                LVPW Diastolic Thickness          1.5 cm                LVOT Diameter                     2.1 cm                Estimated LV Ejection Fraction    70 %                  LV Ejection Fraction MOD BP       70.6 %                >= 55  % LV Ejection Fraction MOD 4C       76.9 %                LV Ejection Fraction MOD 2C       63.7 %                DOPPLER AV Peak Velocity                  4.8 m/s               AV Peak Gradient                  93.6 mmHg             AV Mean Gradient                  52.8 mmHg             LVOT Peak Velocity                1.3 m/s               AV Area Cont Eq vti               1 cm2                 Mitral E Point Velocity           1.1 m/s               Mitral E to A Ratio               1.2                   MV Pressure Half Time             56 ms                  MV Area PHT                       3.9 cm2               MV Deceleration Time              193 ms                MR ERO PISA                       0.045 cm2             MR Regurgitant Volume PISA        6.2 cm3               TR Peak Velocity                  292 cm/s              PV Peak Velocity                  1.4 m/s               PV Peak Gradient                  7.3 mmHg              RVOT Peak Velocity                0.76 m/s              * Indicates values subject to auto-interpretation LV EF:  70    % FINDINGS Left Ventricle Normal left ventricular chamber size. Moderate concentric left ventricular hypertrophy. Normal left ventricular systolic function. Left ventricular ejection fraction is visually estimated to be 70%. Normal diastolic function. Right Ventricle The right ventricle was normal in size and function. Right Atrium The right atrium is normal in size.  Normal inferior vena cava size and inspiratory collapse. Left Atrium The left atrium is normal in size.  Left atrial volume index is 32 mL/sq m. Mitral Valve Mobile vegetation seen on the anterior mitral valve leaflet. Mild mitral regurgitation. No mitral stenosis. Aortic Valve Severe aortic stenosis. Vmax is 5.0 m/s. Transvalvular gradients are - Peak: 104 mmHg, Mean: 59 mmHg. Tricuspid Valve Structurally normal tricuspid valve without significant stenosis. Mild tricuspid regurgitation. Estimated right ventricular systolic pressure  is 38 mmHg. Right atrial pressure is estimated to be 3 mmHg. Pulmonic Valve Structurally normal pulmonic valve without significant stenosis or regurgitation. Pericardium Normal pericardium without effusion. Aorta The aortic root is normal. Sebastian Capps M.D. (Electronically Signed) Final Date:     10 March 2020                 22:25      Micro:  Results     Procedure Component Value Units Date/Time    BLOOD CULTURE [[id number]] Collected:  03/12/20 0424    Order Status:  Completed Specimen:  Blood from Peripheral  "Updated:  03/13/20 0812     Significant Indicator NEG     Source BLD     Site PERIPHERAL     Culture Result No Growth  Note: Blood cultures are incubated for 5 days and  are monitored continuously.Positive blood cultures  are called to the RN and reported as soon as  they are identified.      Narrative:       Per Hospital Policy: Only change Specimen Src: to \"Line\" if  specified by physician order.  Left Hand    BLOOD CULTURE [[id number]] Collected:  03/12/20 0424    Order Status:  Completed Specimen:  Blood from Peripheral Updated:  03/13/20 0812     Significant Indicator NEG     Source BLD     Site PERIPHERAL     Culture Result No Growth  Note: Blood cultures are incubated for 5 days and  are monitored continuously.Positive blood cultures  are called to the RN and reported as soon as  they are identified.      Narrative:       Per Hospital Policy: Only change Specimen Src: to \"Line\" if  specified by physician order.  Left AC    URINE CULTURE(NEW) [[id number]] Collected:  03/10/20 1904    Order Status:  Completed Specimen:  Urine Updated:  03/13/20 0748     Significant Indicator NEG     Source UR     Site -     Culture Result No growth at 48 hours.    Narrative:       Indication for culture:->Septic Shock: Persistent  hypotension,  Lactic acid > 4, vasopressors/inotropes started  Indication for culture:->Septic Shock: Persistent    BLOOD CULTURE [[id number]]  (Abnormal) Collected:  03/10/20 1904    Order Status:  Completed Specimen:  Blood from Peripheral Updated:  03/12/20 1117     Significant Indicator POS     Source BLD     Site PERIPHERAL     Culture Result Growth detected by Bactec instrument.  03/11/2020  09:55      Viridans Streptococcus  See previous culture for sensitivity report.      Narrative:       Per Hospital Policy: Only change Specimen Src: to \"Line\" if  specified by physician order.  Right Forearm/Arm    Blood Culture [[id number]]     Order Status:  Sent Specimen:  Blood from Peripheral     Blood " Culture [[id number]]     Order Status:  Sent Specimen:  Blood from Peripheral     Urinalysis [[id number]]     Order Status:  Sent Specimen:  Urine     URINALYSIS [[id number]] Collected:  03/10/20 1904    Order Status:  Completed Specimen:  Urine Updated:  03/10/20 2025     Color Yellow     Character Clear     Specific Gravity 1.014     Ph 7.0     Glucose Negative mg/dL      Ketones Negative mg/dL      Protein Negative mg/dL      Bilirubin Negative     Urobilinogen, Urine 1.0     Nitrite Negative     Leukocyte Esterase Negative     Occult Blood Negative     Micro Urine Req see below     Comment: Microscopic examination not performed when specimen is clear  and chemically negative for protein, blood, leukocyte esterase  and nitrite.         Narrative:       Indication for culture:->Septic Shock: Persistent  hypotension,  Lactic acid > 4, vasopressors/inotropes started    BLOOD CULTURE [[id number]]     Order Status:  Sent Specimen:  Blood from Peripheral           Assessment:  Active Hospital Problems    Diagnosis   • *Acute bacterial endocarditis [I33.0]   • Sepsis (HCC) [A41.9]   • Hypertension [I10]   • Gout [M10.9]   • Dental caries [K02.9]       Plan:  Sepsis  Secondary to below  Remains febrile T-max 100.1  Leukocytosis    Viridans streptococcal bacteremia  Mitral valve infective endocarditis  Oral source  TTE + veg on MV  Blood cultures on 3/10 - Viridans Streptococcus (penicillin intermediate)  Repeat blood cultures on 3/12-negative to date  Continue ceftriaxone 2 g daily  Anticipate a 4-week course of IV antibiotics from date of first negative blood culture  Midline after repeat blood cultures are negative for 48 hours  If patient continues to have fevers overnight, recommend BEATRIZ to rule out valvular abscess  Home and [facility] abx orders done today in case patient can be discharged over the weekend and for price comparison    Leukocytosis, persistent.  Increased  Secondary to above  On antibiotics  above  Monitor    Back pain, improved  MRI of the thoracic and lumbar spine-negative for infection    Dental caries  Upper teeth extraction 8 to 9 months prior to admission  Patient known to have lower teeth dental caries, however refused extraction at that time  Recommend dental extraction while patient is on antibiotic therapy-however it can be done in outpatient setting    I have performed a physical exam and reviewed and updated ROS and plan today 3/13/2020.  In review of yesterday's note 3/12/2020, there are no changes except as documented above.      Disposition: Patient prefers home IV antibiotics if feasible    Follow-up in ID clinic    Discussed with internal medicine/Dr Mendieta and JOSE Tabares

## 2020-03-13 NOTE — DISCHARGE PLANNING
Patient is a 54 year old  man who lives with his spouse and 5 children in [place], [place]. Patient works full time as a  for a manufacturing company. Patient has [employer] for insurance.     PCP is Dr. Arndt, no other specialists. No issues with ADLs or iADLs. No home O2. [facility]acy [facility] [address]. No issues with AOD or MH.     Patient is independent has own transportation, is agreeable for [facility] for treatment following D/C.     SW to get patient set up with [facility] per possible Sunday D/C.     SW tt Dr. Talavera for order to [facility].     Care Transition Team Assessment    Information Source  Orientation : Oriented x 4  Information Given By: Patient  Who is responsible for making decisions for patient? : Patient    Readmission Evaluation  Is this a readmission?: No    Elopement Risk  Legal Hold: No  Ambulatory or Self Mobile in Wheelchair: Yes  Disoriented: No  Psychiatric Symptoms: None  History of Wandering: No  Elopement this Admit: No  Vocalizing Wanting to Leave: No  Displays Behaviors, Body Language Wanting to Leave: No-Not at Risk for Elopement  Elopement Risk: Not at Risk for Elopement    Interdisciplinary Discharge Planning  Patient or legal guardian wants to designate a caregiver (see row info): No    Discharge Preparedness  What is your plan after discharge?: Home with help  What are your discharge supports?: Child, Spouse  Prior Functional Level: Ambulatory, Drives Self, Independent with Activities of Daily Living, Independent with Medication Management  Difficulity with ADLs: None  Difficulity with IADLs: None    Functional Assesment  Prior Functional Level: Ambulatory, Drives Self, Independent with Activities of Daily Living, Independent with Medication Management    Finances  Financial Barriers to Discharge: No  Prescription Coverage: Yes    Vision / Hearing Impairment  Vision Impairment : Yes  Right Eye Vision: Impaired, Wears Glasses  Left Eye Vision: Impaired, Wears  Glasses  Hearing Impairment : No         Advance Directive  Advance Directive?: None    Domestic Abuse  Have you ever been the victim of abuse or violence?: No  Physical Abuse or Sexual Abuse: No  Verbal Abuse or Emotional Abuse: No  Possible Abuse Reported to:: Not Applicable    Psychological Assessment  History of Substance Abuse: None  History of Psychiatric Problems: No  Non-compliant with Treatment: No  Newly Diagnosed Illness: Yes    Discharge Risks or Barriers  Discharge risks or barriers?: No  Patient risk factors: Complex medical needs    Anticipated Discharge Information  Anticipated discharge disposition: Home, [facility] (outpatient)  Discharge Address: [address] [place] [id number]  Discharge Contact Phone Number: [phone]

## 2020-03-13 NOTE — DISCHARGE PLANNING
Per colleague, in home infusion may be cheaper with patient's insurance. Will try for in-home Infusion first. JOSE discussed with patient and he is agreeable.     Made choice for [facility], emailed to Dahlia JIMENEZ. Infusion orders are in Media tab per Dr. Talavera. She also sent to [facility].     JOSE called, [facility] [phone] to check cost. Insurance has a copay $750.00 for hospital OP visit. Unsure if he has met his deductible.     JOSE called, [facility]: [phone] to check cost. No answer, not able to get quote.     No scheduled appointment. Will need to wait till Monday to D/C.

## 2020-03-13 NOTE — PROGRESS NOTES
Patient very upset with plan of care, not understanding why blood cultures are not being drawn every day. Patient also very upset because he was told he would not be able to discharge Sunday due to a lack of discharge staff.     Writer explained in detail that the Infectious disease doctor recommended blood cultures negative for 48 hours before PICC line placed and that cultures take 48 hours to result. Writer offered to obtain additional blood culture order as patient was visibly upset with writers response, but patient declined and requested to speak with doctor. Writer explained that doctor on overnight is not the patients primary physician over his care and will unlikely be able to answer questions regarding patients discharge plan or make significant changes to plan of care at this time.     Writer explained that patient can discharge on Sunday but that there are many factors going into discharge, including setting up home care, pending test results, and PICC placement. Writer explained that unfortunately it is too difficult at this point to determine exact discharge date but offered to consult care management in am to come talk to patient about plan of care so that he understands discharge process better. Patient still frustrated with situation. Writer continues to offer reassurance that plan for discharge will continue to be addressed everyday and that more communication will be relayed to patient in am.

## 2020-03-14 LAB
ANION GAP SERPL CALC-SCNC: 7 MMOL/L (ref 7–16)
BASOPHILS # BLD AUTO: 0.7 % (ref 0–1.8)
BASOPHILS # BLD: 0.08 K/UL (ref 0–0.12)
BUN SERPL-MCNC: 12 MG/DL (ref 8–22)
CALCIUM SERPL-MCNC: 9.7 MG/DL (ref 8.5–10.5)
CHLORIDE SERPL-SCNC: 102 MMOL/L (ref 96–112)
CO2 SERPL-SCNC: 24 MMOL/L (ref 20–33)
CREAT SERPL-MCNC: 0.86 MG/DL (ref 0.5–1.4)
EOSINOPHIL # BLD AUTO: 0.59 K/UL (ref 0–0.51)
EOSINOPHIL NFR BLD: 5 % (ref 0–6.9)
ERYTHROCYTE [DISTWIDTH] IN BLOOD BY AUTOMATED COUNT: 41.4 FL (ref 35.9–50)
GLUCOSE SERPL-MCNC: 112 MG/DL (ref 65–99)
HCT VFR BLD AUTO: 35.5 % (ref 42–52)
HGB BLD-MCNC: 12 G/DL (ref 14–18)
IMM GRANULOCYTES # BLD AUTO: 0.04 K/UL (ref 0–0.11)
IMM GRANULOCYTES NFR BLD AUTO: 0.3 % (ref 0–0.9)
LYMPHOCYTES # BLD AUTO: 2.26 K/UL (ref 1–4.8)
LYMPHOCYTES NFR BLD: 19.1 % (ref 22–41)
MCH RBC QN AUTO: 32.3 PG (ref 27–33)
MCHC RBC AUTO-ENTMCNC: 33.8 G/DL (ref 33.7–35.3)
MCV RBC AUTO: 95.4 FL (ref 81.4–97.8)
MONOCYTES # BLD AUTO: 1.04 K/UL (ref 0–0.85)
MONOCYTES NFR BLD AUTO: 8.8 % (ref 0–13.4)
NEUTROPHILS # BLD AUTO: 7.84 K/UL (ref 1.82–7.42)
NEUTROPHILS NFR BLD: 66.1 % (ref 44–72)
NRBC # BLD AUTO: 0 K/UL
NRBC BLD-RTO: 0 /100 WBC
PLATELET # BLD AUTO: 313 K/UL (ref 164–446)
PMV BLD AUTO: 9.4 FL (ref 9–12.9)
POTASSIUM SERPL-SCNC: 4.9 MMOL/L (ref 3.6–5.5)
RBC # BLD AUTO: 3.72 M/UL (ref 4.7–6.1)
SODIUM SERPL-SCNC: 133 MMOL/L (ref 135–145)
WBC # BLD AUTO: 11.9 K/UL (ref 4.8–10.8)

## 2020-03-14 PROCEDURE — 700102 HCHG RX REV CODE 250 W/ 637 OVERRIDE(OP): Performed by: HOSPITALIST

## 2020-03-14 PROCEDURE — 700101 HCHG RX REV CODE 250: Performed by: HOSPITALIST

## 2020-03-14 PROCEDURE — 770020 HCHG ROOM/CARE - TELE (206)

## 2020-03-14 PROCEDURE — 80048 BASIC METABOLIC PNL TOTAL CA: CPT

## 2020-03-14 PROCEDURE — 700105 HCHG RX REV CODE 258: Performed by: HOSPITALIST

## 2020-03-14 PROCEDURE — 99221 1ST HOSP IP/OBS SF/LOW 40: CPT | Performed by: INTERNAL MEDICINE

## 2020-03-14 PROCEDURE — 36415 COLL VENOUS BLD VENIPUNCTURE: CPT

## 2020-03-14 PROCEDURE — A9270 NON-COVERED ITEM OR SERVICE: HCPCS | Performed by: HOSPITALIST

## 2020-03-14 PROCEDURE — 99233 SBSQ HOSP IP/OBS HIGH 50: CPT | Performed by: HOSPITALIST

## 2020-03-14 PROCEDURE — 700111 HCHG RX REV CODE 636 W/ 250 OVERRIDE (IP): Performed by: HOSPITALIST

## 2020-03-14 PROCEDURE — 99233 SBSQ HOSP IP/OBS HIGH 50: CPT | Performed by: INTERNAL MEDICINE

## 2020-03-14 PROCEDURE — 85025 COMPLETE CBC W/AUTO DIFF WBC: CPT

## 2020-03-14 RX ADMIN — OXYCODONE HYDROCHLORIDE 10 MG: 5 TABLET ORAL at 05:50

## 2020-03-14 RX ADMIN — POLYETHYLENE GLYCOL 3350 1 PACKET: 17 POWDER, FOR SOLUTION ORAL at 05:51

## 2020-03-14 RX ADMIN — METHOCARBAMOL TABLETS 500 MG: 500 TABLET, COATED ORAL at 05:51

## 2020-03-14 RX ADMIN — METHOCARBAMOL TABLETS 500 MG: 500 TABLET, COATED ORAL at 17:10

## 2020-03-14 RX ADMIN — SENNOSIDES AND DOCUSATE SODIUM 2 TABLET: 8.6; 5 TABLET ORAL at 05:51

## 2020-03-14 RX ADMIN — CEFTRIAXONE SODIUM 2 G: 2 INJECTION, POWDER, FOR SOLUTION INTRAMUSCULAR; INTRAVENOUS at 17:10

## 2020-03-14 RX ADMIN — OXYCODONE HYDROCHLORIDE 10 MG: 5 TABLET ORAL at 20:24

## 2020-03-14 RX ADMIN — ALLOPURINOL 200 MG: 100 TABLET ORAL at 05:51

## 2020-03-14 RX ADMIN — ALLOPURINOL 200 MG: 100 TABLET ORAL at 17:10

## 2020-03-14 RX ADMIN — ACETAMINOPHEN 1000 MG: 500 TABLET ORAL at 14:16

## 2020-03-14 RX ADMIN — ACETAMINOPHEN 1000 MG: 500 TABLET ORAL at 05:50

## 2020-03-14 RX ADMIN — LIDOCAINE 2 PATCH: 50 PATCH TOPICAL at 17:14

## 2020-03-14 RX ADMIN — METHOCARBAMOL TABLETS 500 MG: 500 TABLET, COATED ORAL at 11:25

## 2020-03-14 RX ADMIN — SENNOSIDES AND DOCUSATE SODIUM 2 TABLET: 8.6; 5 TABLET ORAL at 17:10

## 2020-03-14 ASSESSMENT — ENCOUNTER SYMPTOMS
HEADACHES: 0
ABDOMINAL PAIN: 0
CONSTIPATION: 1
DIAPHORESIS: 0
FEVER: 1
DIARRHEA: 0
VOMITING: 0
FOCAL WEAKNESS: 0
WEAKNESS: 0
NAUSEA: 0
SHORTNESS OF BREATH: 0
DIZZINESS: 0
EYE REDNESS: 0
STRIDOR: 0
COUGH: 0
WHEEZING: 0
BACK PAIN: 1
SPEECH CHANGE: 0
PALPITATIONS: 0
NECK PAIN: 0
HALLUCINATIONS: 0
EYE DISCHARGE: 0
CHILLS: 1
NERVOUS/ANXIOUS: 1

## 2020-03-14 ASSESSMENT — LIFESTYLE VARIABLES: SUBSTANCE_ABUSE: 0

## 2020-03-14 ASSESSMENT — FIBROSIS 4 INDEX: FIB4 SCORE: 1.17

## 2020-03-14 NOTE — CARE PLAN
Problem: Safety  Goal: Will remain free from falls  Outcome: PROGRESSING AS EXPECTED  Intervention: Implement fall precautions  Flowsheets (Taken 3/13/2020 0900 by Taty Sánchez, R.N.)  Environmental Precautions:   Treaded Slipper Socks on Patient   Personal Belongings, Wastebasket, Call Bell etc. in Easy Reach   Transferred to Stronger Side   Communication Sign for Patients & Families   Bed in Low Position   Report Given to Other Health Care Providers Regarding Fall Risk   Mobility Assessed & Appropriate Sign Placed     Problem: Infection  Goal: Will remain free from infection  Outcome: PROGRESSING AS EXPECTED  Intervention: Implement standard precautions and perform hand washing before and after patient contact  Note: Hand hygiene performed before and after all patient care.

## 2020-03-14 NOTE — PROGRESS NOTES
Infectious Disease Progress Note    Author: Katia Talavera M.D. Date & Time of service: 3/14/2020  8:24 AM    Chief Complaint:  Follow-up for gram-positive bacteremia/infective endocarditis    Interval History:  3/12 T-max 101.9 WBC 11. 6 repeat blood cultures pending.  Blood cultures on 3/10 growing Streptococcus.  Patient feels better today.  He states his back pain is also improved.  MRI of the thoracic and lumbar spine without any evidence of infection.  3/13 T-max 100.1 WBC 11.9 continues to have low-grade fevers.  He did wake up with night sweats.  However he overall feels well with decreased back pain.  Remains constipated  3/14 Tmax 100.6 WBC 11.9 remains febrile.  Patient not feeling his fevers.  He is frustrated by his course of hospitalization.  Denies any new symptoms.  No improvement in leukocytosis.  Wife and patient with many questions that were addressed.  Plan of care discussed in detail    Labs Reviewed, Medications Reviewed and Radiology Reviewed.    Review of Systems:  Review of Systems   Constitutional: Positive for fever.   Respiratory: Negative for cough and shortness of breath.    Gastrointestinal: Positive for constipation. Negative for abdominal pain, diarrhea, nausea and vomiting.   Musculoskeletal: Positive for back pain.        Decreased   Neurological: Negative for dizziness and headaches.   All other systems reviewed and are negative.      Hemodynamics:  Temp (24hrs), Av.4 °C (99.4 °F), Min:37.2 °C (98.9 °F), Max:38.1 °C (100.6 °F)  Temperature: 37.6 °C (99.7 °F)  Pulse  Av.8  Min: 90  Max: 121   Blood Pressure: 129/87       Physical Exam:  Physical Exam  Constitutional:       Appearance: Normal appearance. He is not ill-appearing.   HENT:      Mouth/Throat:      Mouth: Mucous membranes are moist.      Pharynx: No oropharyngeal exudate.      Comments: Upper dentures    Lower teeth dental caries  Eyes:      Extraocular Movements: Extraocular movements intact.       Conjunctiva/sclera: Conjunctivae normal.      Pupils: Pupils are equal, round, and reactive to light.   Neck:      Musculoskeletal: Normal range of motion and neck supple.   Cardiovascular:      Rate and Rhythm: Normal rate.      Heart sounds: Murmur present.   Abdominal:      Palpations: Abdomen is soft.      Tenderness: There is no abdominal tenderness.   Musculoskeletal: Normal range of motion.         General: No swelling.      Right lower leg: No edema.      Left lower leg: No edema.   Skin:     General: Skin is warm and dry.   Neurological:      General: No focal deficit present.      Mental Status: He is alert and oriented to person, place, and time.      Cranial Nerves: No cranial nerve deficit.   Psychiatric:         Mood and Affect: Mood normal.         Behavior: Behavior normal.      Comments: Pleasant         Meds:    Current Facility-Administered Medications:   •  Notify provider if pain remains uncontrolled **AND** Use the numeric rating scale (NRS-11) on regular floors and Critical-Care Pain Observation Tool (CPOT) on ICUs/Trauma to assess pain **AND** Pulse Ox (Oximetry) **AND** Pharmacy Consult Request **AND** If patient difficult to arouse and/or has respiratory depression, stop any opiates that are currently infusing and call a Rapid Response. **AND** oxyCODONE immediate-release **AND** [DISCONTINUED] oxyCODONE immediate-release **AND** [DISCONTINUED] HYDROmorphone  •  methocarbamol  •  lidocaine  •  allopurinol  •  lisinopril  •  senna-docusate **AND** polyethylene glycol/lytes **AND** magnesium hydroxide **AND** bisacodyl  •  LR  •  LR  •  acetaminophen  •  ondansetron  •  ondansetron  •  promethazine  •  promethazine  •  prochlorperazine  •  cefTRIAXone (ROCEPHIN) IV    Labs:  Recent Labs     03/12/20  0424 03/13/20  0446 03/14/20  0316   WBC 11.6* 11.9* 11.9*   RBC 3.72* 3.46* 3.72*   HEMOGLOBIN 12.0* 11.2* 12.0*   HEMATOCRIT 35.0* 33.4* 35.5*   MCV 94.1 96.5 95.4   MCH 32.3 32.4 32.3   RDW  41.6 41.9 41.4   PLATELETCT 262 262 313   MPV 9.4 9.4 9.4   NEUTSPOLYS 70.50 66.60 66.10   LYMPHOCYTES 17.10* 19.60* 19.10*   MONOCYTES 10.10 9.70 8.80   EOSINOPHILS 1.60 3.00 5.00   BASOPHILS 0.40 0.60 0.70     Recent Labs     03/12/20  0424 03/14/20  0316   SODIUM 139 133*   POTASSIUM 4.4 4.9   CHLORIDE 102 102   CO2 21 24   GLUCOSE 108* 112*   BUN 12 12     Recent Labs     03/12/20  0424 03/14/20  0316   CREATININE 0.84 0.86       Imaging:  Dx-chest-2 Views    Result Date: 3/9/2020    3/9/2020 10:57 PM HISTORY/REASON FOR EXAM: COUGH; Cough TECHNIQUE/EXAM DESCRIPTION:  PA and lateral views of the chest. COMPARISON: None FINDINGS: The cardiac silhouette appears within normal limits. The mediastinal contour appears within normal limits.  The central pulmonary vasculature appears normal. The lungs appear well expanded bilaterally.  Bilateral lungs are clear. No significant pleural effusions are identified. The bony structures appear age-appropriate.     1.  No acute cardiopulmonary disease.    Dx-chest-portable (1 View)    Result Date: 3/10/2020  3/10/2020 6:45 PM HISTORY/REASON FOR EXAM:  Sepsis and shortness of breath TECHNIQUE/EXAM DESCRIPTION AND NUMBER OF VIEWS: Single portable view of the chest. COMPARISON: 03/09/2020 FINDINGS: Heart size is within normal limits. No focal infiltrates or consolidations are identified in the lungs. No pleural fluid collections are identified. No pneumothorax is appreciated.     No acute cardiac or pulmonary abnormality is identified.    Mr-lumbar Spine-with & W/o    Result Date: 3/11/2020  3/11/2020 7:41 PM HISTORY/REASON FOR EXAM:  septic emboli. Low back pain. Fevers. Diffuse joint pain. TECHNIQUE/EXAM DESCRIPTION: MRI of the lumbar spine without and with contrast. The study was performed on a [employer] 1.5 Martha MRI scanner. T1 sagittal, T2 fast spin-echo sagittal, and T2 axial images were obtained of the lumbar spine. T1 post-contrast fat-suppressed sagittal images were  obtained. Optional T2 fat-suppressed sagittal and T1 post-contrast axial images may be obtained. 20 mL ProHance gadolinium contrast was administered intravenously. COMPARISON:  MRI of thoracic spine from today's date FINDINGS: Alignment in the lumbar spine shows grade 1-2 spondylolisthesis of L5 on S1. There is bilateral L5 spondylolysis. There is mild degenerative retrolisthesis of L4 on L5. Marrow signal the vertebral bodies shows chronic fatty signal discogenic endplate marrow changes associated with Schmorl's node endplate irregularities at L4-5 and L5-S1. There is no pathologic osseous enhancement. There is no evidence of discitis or osteomyelitis. No evidence of epidural phlegmon or epidural abscess. There is mild degenerative disc space narrowing at L4-5 primarily at the posterior disc space. There is advanced degenerative disc space narrowing at L5-S1. The prevertebral and paraspinous soft tissues are unremarkable. The conus is normal in position and signal with its tip at the L1 level.  There is no abnormal cord enhancement. There is no abnormal intradural extra medullary enhancement. At T12-L1, no abnormality. At L1-2, no abnormality. At L2-3, no significant disc bulge or protrusion. No central or foraminal stenosis. Mild facet prominence. At L3-4, no significant disc bulge or protrusion. No central or foraminal stenosis. At L4-5, minimal disc bulging. Moderate hypertrophic facet arthropathy. No central stenosis. Moderate bilateral foraminal stenosis. At L5-S1, mild pseudo-disc bulging associated with spondylolisthesis. Severe bilateral foraminal stenosis with horizontal deformity of the neural foramina due to spondylolisthesis. Marked flattening of the exiting L5 nerve roots within the neural foramina.     1.  L5-S1 grade 1-2 spondylolisthesis with bilateral L5 spondylolysis. L4-5 slight degenerative retrolisthesis. 2.  L4-5 minimal disc bulging. Moderate hypertrophic facet arthropathy. Moderate bilateral  foraminal stenosis. 3.  L5-S1 mild pseudo-disc bulging associated with spondylolisthesis. Severe bilateral foraminal stenosis due to foraminal deformity associated with spondylolisthesis. 4.  No evidence of discitis, osteomyelitis, or epidural phlegmon/epidural abscess.    Mr-thoracic Spine-with & W/o    Result Date: 3/11/2020  3/11/2020 7:40 PM HISTORY/REASON FOR EXAM:  septic emboli. Worsening low back pain. Fevers. Diffuse joint pain. TECHNIQUE/EXAM DESCRIPTION: MRI of the thoracic spine without and with contrast. The study was performed on a [employer] 1.5 Martha MRI scanner. T1 sagittal, T2 fast spin-echo sagittal, and T2 axial images were obtained of the thoracic spine. T1 post-contrast fat suppressed sagittal images were obtained. Optional T1 post-contrast axial images may be obtained. 20 mL ProHance gadolinium contrast was administered intravenously. COMPARISON:  MRI lumbar spine from today's date FINDINGS: Alignment in the thoracic spine is normal. Marrow signal in the vertebral bodies is normal. There is no abnormal osseous enhancement. No evidence of discitis or osteomyelitis or epidural phlegmon/abscess. The thoracic spinal cord is normal in caliber and signal throughout its course. There is no abnormal cord enhancement. There is no abnormal intradural extra medullary enhancement. At T9-T10, there is a small central disc protrusion with a slight component of cephalad extrusion (T2 axial image 4, series 11, T2 sagittal image 9, series 10). Partial effacement of ventral subarachnoid space without cord deformity or javon central stenosis. Dorsal subarachnoid space remains generous. The neural foramina are widely patent at all thoracic levels.     1.  T9-T10 small central disc protrusion with slight component of cephalad extrusion. No javon central stenosis. 2.  No evidence of discitis, osteomyelitis, or epidural phlegmon/abscess.    Ec-echocardiogram Complete W/o Cont    Result Date: 3/10/2020  Transthoracic  Echo Report Echocardiography Laboratory CONCLUSIONS No prior study is available for comparison. Normal left ventricular chamber size. Moderate concentric left ventricular hypertrophy. Left ventricular ejection fraction is visually estimated to be 70%. Mobile vegetation seen on the anterior mitral valve leaflet. Mild mitral regurgitation. Severe aortic stenosis. Transvalvular gradients are - Peak: 104 mmHg, Mean: 59 mmHg. DEBBIE NIEVES Exam Date:         03/10/2020                    20:57 Exam Location:     Inpatient Priority:          Stat Ordering Physician:        JEFF DAVEY Referring Physician:       [id number] Henley Sonographer:               Ana Ho RDCS Age:    54     Gender:    M MRN:    [id number] :    1965 BSA:    2.07   Ht (in):    69     Wt (lb):    202 Exam Type:     Complete Indications:     Acute rheumatic endocarditis, Endocarditis ICD Codes:       I01.1  421 CPT Codes:       60091 BP:   105    /   65     HR:   97 Technical Quality:       Fair MEASUREMENTS  (Male / Female) Normal Values 2D ECHO LV Diastolic Diameter PLAX        3.5 cm                4.2 - 5.9 / 3.9 - 5.3 cm LV Systolic Diameter PLAX         1.7 cm                2.1 - 4.0 cm IVS Diastolic Thickness           1.4 cm                LVPW Diastolic Thickness          1.5 cm                LVOT Diameter                     2.1 cm                Estimated LV Ejection Fraction    70 %                  LV Ejection Fraction MOD BP       70.6 %                >= 55  % LV Ejection Fraction MOD 4C       76.9 %                LV Ejection Fraction MOD 2C       63.7 %                DOPPLER AV Peak Velocity                  4.8 m/s               AV Peak Gradient                  93.6 mmHg             AV Mean Gradient                  52.8 mmHg             LVOT Peak Velocity                1.3 m/s               AV Area Cont Eq vti               1 cm2                 Mitral E Point Velocity           1.1 m/s               Mitral  E to A Ratio               1.2                   MV Pressure Half Time             56 ms                 MV Area PHT                       3.9 cm2               MV Deceleration Time              193 ms                MR ERO PISA                       0.045 cm2             MR Regurgitant Volume PISA        6.2 cm3               TR Peak Velocity                  292 cm/s              PV Peak Velocity                  1.4 m/s               PV Peak Gradient                  7.3 mmHg              RVOT Peak Velocity                0.76 m/s              * Indicates values subject to auto-interpretation LV EF:  70    % FINDINGS Left Ventricle Normal left ventricular chamber size. Moderate concentric left ventricular hypertrophy. Normal left ventricular systolic function. Left ventricular ejection fraction is visually estimated to be 70%. Normal diastolic function. Right Ventricle The right ventricle was normal in size and function. Right Atrium The right atrium is normal in size.  Normal inferior vena cava size and inspiratory collapse. Left Atrium The left atrium is normal in size.  Left atrial volume index is 32 mL/sq m. Mitral Valve Mobile vegetation seen on the anterior mitral valve leaflet. Mild mitral regurgitation. No mitral stenosis. Aortic Valve Severe aortic stenosis. Vmax is 5.0 m/s. Transvalvular gradients are - Peak: 104 mmHg, Mean: 59 mmHg. Tricuspid Valve Structurally normal tricuspid valve without significant stenosis. Mild tricuspid regurgitation. Estimated right ventricular systolic pressure  is 38 mmHg. Right atrial pressure is estimated to be 3 mmHg. Pulmonic Valve Structurally normal pulmonic valve without significant stenosis or regurgitation. Pericardium Normal pericardium without effusion. Aorta The aortic root is normal. Sebastian Capps M.D. (Electronically Signed) Final Date:     10 March 2020                 22:25      Micro:  Results     Procedure Component Value Units Date/Time    BLOOD CULTURE  "[[id number]] Collected:  03/12/20 0424    Order Status:  Completed Specimen:  Blood from Peripheral Updated:  03/13/20 0812     Significant Indicator NEG     Source BLD     Site PERIPHERAL     Culture Result No Growth  Note: Blood cultures are incubated for 5 days and  are monitored continuously.Positive blood cultures  are called to the RN and reported as soon as  they are identified.      Narrative:       Per Hospital Policy: Only change Specimen Src: to \"Line\" if  specified by physician order.  Left Hand    BLOOD CULTURE [[id number]] Collected:  03/12/20 0424    Order Status:  Completed Specimen:  Blood from Peripheral Updated:  03/13/20 0812     Significant Indicator NEG     Source BLD     Site PERIPHERAL     Culture Result No Growth  Note: Blood cultures are incubated for 5 days and  are monitored continuously.Positive blood cultures  are called to the RN and reported as soon as  they are identified.      Narrative:       Per Hospital Policy: Only change Specimen Src: to \"Line\" if  specified by physician order.  Left AC    URINE CULTURE(NEW) [[id number]] Collected:  03/10/20 1904    Order Status:  Completed Specimen:  Urine Updated:  03/13/20 0748     Significant Indicator NEG     Source UR     Site -     Culture Result No growth at 48 hours.    Narrative:       Indication for culture:->Septic Shock: Persistent  hypotension,  Lactic acid > 4, vasopressors/inotropes started  Indication for culture:->Septic Shock: Persistent    BLOOD CULTURE [[id number]]  (Abnormal) Collected:  03/10/20 1904    Order Status:  Completed Specimen:  Blood from Peripheral Updated:  03/12/20 1117     Significant Indicator POS     Source BLD     Site PERIPHERAL     Culture Result Growth detected by Bactec instrument.  03/11/2020  09:55      Viridans Streptococcus  See previous culture for sensitivity report.      Narrative:       Per Hospital Policy: Only change Specimen Src: to \"Line\" if  specified by physician order.  Right Forearm/Arm "    Blood Culture [[id number]] Collected:  03/10/20 2059    Order Status:  Canceled Specimen:  Other from Peripheral     Blood Culture [[id number]] Collected:  03/10/20 2059    Order Status:  Canceled Specimen:  Other from Peripheral     Urinalysis [[id number]] Collected:  03/10/20 2059    Order Status:  Canceled Specimen:  Urine     URINALYSIS [[id number]] Collected:  03/10/20 1904    Order Status:  Completed Specimen:  Urine Updated:  03/10/20 2025     Color Yellow     Character Clear     Specific Gravity 1.014     Ph 7.0     Glucose Negative mg/dL      Ketones Negative mg/dL      Protein Negative mg/dL      Bilirubin Negative     Urobilinogen, Urine 1.0     Nitrite Negative     Leukocyte Esterase Negative     Occult Blood Negative     Micro Urine Req see below     Comment: Microscopic examination not performed when specimen is clear  and chemically negative for protein, blood, leukocyte esterase  and nitrite.         Narrative:       Indication for culture:->Septic Shock: Persistent  hypotension,  Lactic acid > 4, vasopressors/inotropes started    BLOOD CULTURE [[id number]] Collected:  03/10/20 1832    Order Status:  Canceled Specimen:  Other from Peripheral           Assessment:  Active Hospital Problems    Diagnosis   • *Acute bacterial endocarditis [I33.0]   • Sepsis (HCC) [A41.9]   • Hypertension [I10]   • Gout [M10.9]   • Dental caries [K02.9]       Plan:  Sepsis  Secondary to below  Remains febrile   Leukocytosis-persistent    Viridans streptococcal bacteremia  Mitral valve infective endocarditis  Oral source  TTE + veg on MV  Blood cultures on 3/10 - Viridans Streptococcus (penicillin intermediate)  Repeat blood cultures on 3/12-negative to date  Continue ceftriaxone 2 g daily  Anticipate a 4-week course of IV antibiotics from date of first negative blood culture  Midline after repeat blood cultures are negative for 48 hours  Needs BEATRIZ to rule out valvular abscess given persistent fevers.  If BEATRIZ positive for  valvular abscess, patient needs CT surgery evaluation  Home and R-OPIC abx orders done 3/13 in case patient can be discharged over the weekend and for price comparison    Leukocytosis, persistent.  Stable  Secondary to above  On antibiotics above  Monitor    Back pain, improved  MRI of the thoracic and lumbar spine-negative for infection    Dental caries  Upper teeth extraction 8 to 9 months prior to admission  Patient known to have lower teeth dental caries, however refused extraction at that time  Recommend dental extraction while patient is on antibiotic therapy-however it can be done in outpatient setting    I have performed a physical exam and reviewed and updated ROS and plan today 3/14/2020.  In review of yesterday's note 3/13/2020, there are no changes except as documented above.      Disposition: Home IV antibiotics versus [facility]    Follow-up in ID clinic    Discussed with internal medicine/Dr Mendieta and patient's wife

## 2020-03-14 NOTE — PROGRESS NOTES
Bedside report received, pt care assumed, tele box on.  Pt is A&Ox4, sitting at the edge of bed, and denies any additional needs at this time. Bed in lowest position and call light within reach.

## 2020-03-14 NOTE — DISCHARGE PLANNING
@1140  Agency/Facility Name: [facility]  Spoke To: Dontrell  Outcome: Per Dontrell he stated [facility] didn't received anything and requested a referral.      @1120  Agency/Facility Name: [facility]  Spoke To: Dontrell  Outcome: Per Dontrell he will call [facility] back looking for patients notes.      HUSAM Wong Notified

## 2020-03-14 NOTE — PROGRESS NOTES
[facility] Medicine Daily Progress Note    Date of Service  3/14/2020    Chief Complaint  54 y.o. male admitted 3/10/2020 with fevers.     Hospital Course    Patient with history of upper teeth extractions with dentures placed, lower tooth dental decay with future dental work planned admitted with fever over a month.  Patient was seen in ER on 3/9/2020 and thought to be viral illness.  Admitted following positive blood cultures and findings consistent with endocarditis.    Interval Problem Update    Still having low grade temps.  Repeat blood cultures have been negative.  Concern for underlying abscess or complicated endocarditis MV lesion-- I discussed findings with ID, dr. Talavera .    Consultants/Specialty  Dr Talavera ID    Code Status  Full     Disposition    Plan continue antibiotics, to be determined.  Will need prolonged IV antibiotics    Review of Systems  Review of Systems   Constitutional: Positive for chills and fever. Negative for diaphoresis and malaise/fatigue.   HENT: Negative for congestion.    Eyes: Negative for discharge and redness.   Respiratory: Negative for cough, shortness of breath, wheezing and stridor.    Cardiovascular: Negative for chest pain and palpitations.   Gastrointestinal: Negative for abdominal pain, diarrhea and vomiting.   Musculoskeletal: Positive for back pain. Negative for joint pain and neck pain.        Controlled    Neurological: Negative for dizziness, speech change, focal weakness, weakness and headaches.   Psychiatric/Behavioral: Negative for hallucinations and substance abuse. The patient is nervous/anxious.         Physical Exam  Temp:  [37.2 °C (98.9 °F)-38.1 °C (100.6 °F)] 37.6 °C (99.7 °F)  Pulse:  [] 113  Resp:  [16-18] 18  BP: ()/(67-96) 129/87  SpO2:  [91 %-94 %] 93 %    Physical Exam  Constitutional:       General: He is not in acute distress.     Appearance: He is obese. He is not diaphoretic.   HENT:      Head: Normocephalic and atraumatic.      Right  Ear: External ear normal.      Left Ear: External ear normal.      Nose: Nose normal.   Eyes:      General: No scleral icterus.     Extraocular Movements: Extraocular movements intact.      Conjunctiva/sclera: Conjunctivae normal.   Neck:      Musculoskeletal: Normal range of motion. No neck rigidity.   Cardiovascular:      Rate and Rhythm: Normal rate and regular rhythm.      Heart sounds: Murmur (3/6 high-pitched systolic ejection murmur) present.   Pulmonary:      Breath sounds: No stridor. No wheezing, rhonchi or rales.   Abdominal:      General: There is no distension.      Palpations: Abdomen is soft.      Tenderness: There is no abdominal tenderness.   Musculoskeletal:         General: No swelling or tenderness.   Skin:     General: Skin is warm and dry.   Neurological:      General: No focal deficit present.      Mental Status: He is alert and oriented to person, place, and time. Mental status is at baseline.      Sensory: No sensory deficit.      Comments: Gait nl .   Psychiatric:         Mood and Affect: Mood normal.         Behavior: Behavior normal.         Fluids    Intake/Output Summary (Last 24 hours) at 3/14/2020 0859  Last data filed at 3/14/2020 0831  Gross per 24 hour   Intake 240 ml   Output --   Net 240 ml       Laboratory  Recent Labs     03/12/20  0424 03/13/20  0446 03/14/20  0316   WBC 11.6* 11.9* 11.9*   RBC 3.72* 3.46* 3.72*   HEMOGLOBIN 12.0* 11.2* 12.0*   HEMATOCRIT 35.0* 33.4* 35.5*   MCV 94.1 96.5 95.4   MCH 32.3 32.4 32.3   MCHC 34.3 33.5* 33.8   RDW 41.6 41.9 41.4   PLATELETCT 262 262 313   MPV 9.4 9.4 9.4     Recent Labs     03/12/20  0424 03/14/20  0316   SODIUM 139 133*   POTASSIUM 4.4 4.9   CHLORIDE 102 102   CO2 21 24   GLUCOSE 108* 112*   BUN 12 12   CREATININE 0.84 0.86   CALCIUM 8.7 9.7                   Imaging  MR-LUMBAR SPINE-WITH & W/O   Final Result      1.  L5-S1 grade 1-2 spondylolisthesis with bilateral L5 spondylolysis. L4-5 slight degenerative retrolisthesis.   2.   L4-5 minimal disc bulging. Moderate hypertrophic facet arthropathy. Moderate bilateral foraminal stenosis.   3.  L5-S1 mild pseudo-disc bulging associated with spondylolisthesis. Severe bilateral foraminal stenosis due to foraminal deformity associated with spondylolisthesis.   4.  No evidence of discitis, osteomyelitis, or epidural phlegmon/epidural abscess.      MR-THORACIC SPINE-WITH & W/O   Final Result      1.  T9-T10 small central disc protrusion with slight component of cephalad extrusion. No javon central stenosis.   2.  No evidence of discitis, osteomyelitis, or epidural phlegmon/abscess.      EC-ECHOCARDIOGRAM COMPLETE W/O CONT   Final Result      DX-CHEST-PORTABLE (1 VIEW)   Final Result         No acute cardiac or pulmonary abnormality is identified.           Assessment/Plan  * Acute bacterial endocarditis- (present on admission)  Assessment & Plan  Blood cultures growing strep species. Anterior Mitral valve vegetation.  S. Virdans from dental infection.   Patient with back pain however MRI of the thoracic and lumbar spine without acute inflammatory changes or signs of infection.  Recommend removal of the remainder of his teeth as an outpatient.  Monitor for signs of acute heart failure.  Telemetry monitoring  Follow-up repeat blood culture final results Plan for Midline if negative  Discussed with Dr. Talavera, ID- given persistent fevers- concern for complicated MV lesion, abscess- Will order BEATRIZ.     Sepsis (HCC)- (present on admission)  Assessment & Plan  This is Sepsis Present on admission  SIRS criteria identified on my evaluation include: Fever, with temperature greater than 101 deg F, Tachycardia, with heart rate greater than 90 BPM and Leukocyosis, with WBC greater than 12,000  Source is endocarditis.  Sepsis protocol initiated  Fluid resuscitation ordered per protocol  IV antibiotics as appropriate for source of sepsis  While organ dysfunction may be noted elsewhere in this problem list or in  the chart, degree of organ dysfunction does not meet CMS criteria for severe sepsis  S Viridans bacteremia-  Suspect odontogenic source.  Follow-up repeat blood cultures. If remains negative- will place midline.   IV Rocephin   Prn tylenol for fevers  Additional workup of MV lesion.             Back pain  Assessment & Plan  MRI thoracic and lumbar spine with variable spondylolisthesis, disc protrusion, foraminal stenosis.  No signs of acute infection.  Continue PRN oxycodone, IV Dilaudid  Neuro stable. - Multimodality pain control-cw Lidoderm patch, scheduled Robaxin, PRN NSAIDs.  Wean narcotics as corine    Dental caries  Assessment & Plan  Likely source of bacteremia.  Advised patient he needs to have the remainder of his teeth removed.    Gout- (present on admission)  Assessment & Plan  Continue allopurinol.    Hypertension- (present on admission)  Assessment & Plan  Continue lisinopril.       VTE prophylaxis: SCDs

## 2020-03-15 PROBLEM — I35.0 AORTIC STENOSIS: Status: ACTIVE | Noted: 2020-03-15

## 2020-03-15 LAB
ANION GAP SERPL CALC-SCNC: 11 MMOL/L (ref 7–16)
BASOPHILS # BLD AUTO: 0.6 % (ref 0–1.8)
BASOPHILS # BLD: 0.08 K/UL (ref 0–0.12)
BUN SERPL-MCNC: 14 MG/DL (ref 8–22)
CALCIUM SERPL-MCNC: 9.6 MG/DL (ref 8.5–10.5)
CHLORIDE SERPL-SCNC: 101 MMOL/L (ref 96–112)
CO2 SERPL-SCNC: 22 MMOL/L (ref 20–33)
CREAT SERPL-MCNC: 0.94 MG/DL (ref 0.5–1.4)
EKG IMPRESSION: NORMAL
EOSINOPHIL # BLD AUTO: 0.26 K/UL (ref 0–0.51)
EOSINOPHIL NFR BLD: 1.9 % (ref 0–6.9)
ERYTHROCYTE [DISTWIDTH] IN BLOOD BY AUTOMATED COUNT: 40.1 FL (ref 35.9–50)
GLUCOSE SERPL-MCNC: 116 MG/DL (ref 65–99)
HCT VFR BLD AUTO: 36.4 % (ref 42–52)
HGB BLD-MCNC: 12.5 G/DL (ref 14–18)
IMM GRANULOCYTES # BLD AUTO: 0.08 K/UL (ref 0–0.11)
IMM GRANULOCYTES NFR BLD AUTO: 0.6 % (ref 0–0.9)
LYMPHOCYTES # BLD AUTO: 2.3 K/UL (ref 1–4.8)
LYMPHOCYTES NFR BLD: 16.9 % (ref 22–41)
MCH RBC QN AUTO: 31.7 PG (ref 27–33)
MCHC RBC AUTO-ENTMCNC: 34.3 G/DL (ref 33.7–35.3)
MCV RBC AUTO: 92.4 FL (ref 81.4–97.8)
MONOCYTES # BLD AUTO: 1.19 K/UL (ref 0–0.85)
MONOCYTES NFR BLD AUTO: 8.8 % (ref 0–13.4)
NEUTROPHILS # BLD AUTO: 9.69 K/UL (ref 1.82–7.42)
NEUTROPHILS NFR BLD: 71.2 % (ref 44–72)
NRBC # BLD AUTO: 0 K/UL
NRBC BLD-RTO: 0 /100 WBC
PLATELET # BLD AUTO: 344 K/UL (ref 164–446)
PMV BLD AUTO: 9.4 FL (ref 9–12.9)
POTASSIUM SERPL-SCNC: 4.5 MMOL/L (ref 3.6–5.5)
RBC # BLD AUTO: 3.94 M/UL (ref 4.7–6.1)
SODIUM SERPL-SCNC: 134 MMOL/L (ref 135–145)
WBC # BLD AUTO: 13.6 K/UL (ref 4.8–10.8)

## 2020-03-15 PROCEDURE — 700102 HCHG RX REV CODE 250 W/ 637 OVERRIDE(OP): Performed by: HOSPITALIST

## 2020-03-15 PROCEDURE — 85025 COMPLETE CBC W/AUTO DIFF WBC: CPT

## 2020-03-15 PROCEDURE — 700111 HCHG RX REV CODE 636 W/ 250 OVERRIDE (IP): Performed by: INTERNAL MEDICINE

## 2020-03-15 PROCEDURE — 99232 SBSQ HOSP IP/OBS MODERATE 35: CPT | Performed by: HOSPITALIST

## 2020-03-15 PROCEDURE — A9270 NON-COVERED ITEM OR SERVICE: HCPCS | Performed by: HOSPITALIST

## 2020-03-15 PROCEDURE — 700105 HCHG RX REV CODE 258: Performed by: INTERNAL MEDICINE

## 2020-03-15 PROCEDURE — 700101 HCHG RX REV CODE 250: Performed by: HOSPITALIST

## 2020-03-15 PROCEDURE — 93005 ELECTROCARDIOGRAM TRACING: CPT | Performed by: HOSPITALIST

## 2020-03-15 PROCEDURE — 36415 COLL VENOUS BLD VENIPUNCTURE: CPT

## 2020-03-15 PROCEDURE — 93010 ELECTROCARDIOGRAM REPORT: CPT | Performed by: INTERNAL MEDICINE

## 2020-03-15 PROCEDURE — 770020 HCHG ROOM/CARE - TELE (206)

## 2020-03-15 PROCEDURE — 99232 SBSQ HOSP IP/OBS MODERATE 35: CPT | Performed by: INTERNAL MEDICINE

## 2020-03-15 PROCEDURE — 80048 BASIC METABOLIC PNL TOTAL CA: CPT

## 2020-03-15 RX ORDER — METHOCARBAMOL 500 MG/1
750 TABLET, FILM COATED ORAL 3 TIMES DAILY
Status: DISCONTINUED | OUTPATIENT
Start: 2020-03-15 | End: 2020-03-17 | Stop reason: HOSPADM

## 2020-03-15 RX ADMIN — LISINOPRIL 10 MG: 10 TABLET ORAL at 05:02

## 2020-03-15 RX ADMIN — OXYCODONE HYDROCHLORIDE 10 MG: 5 TABLET ORAL at 04:17

## 2020-03-15 RX ADMIN — ACETAMINOPHEN 1000 MG: 500 TABLET ORAL at 05:02

## 2020-03-15 RX ADMIN — SENNOSIDES AND DOCUSATE SODIUM 2 TABLET: 8.6; 5 TABLET ORAL at 05:03

## 2020-03-15 RX ADMIN — METHOCARBAMOL TABLETS 500 MG: 500 TABLET, COATED ORAL at 05:02

## 2020-03-15 RX ADMIN — CEFTRIAXONE SODIUM 2 G: 2 INJECTION, POWDER, FOR SOLUTION INTRAMUSCULAR; INTRAVENOUS at 17:36

## 2020-03-15 RX ADMIN — ACETAMINOPHEN 1000 MG: 500 TABLET ORAL at 23:55

## 2020-03-15 RX ADMIN — LIDOCAINE 2 PATCH: 50 PATCH TOPICAL at 17:37

## 2020-03-15 RX ADMIN — OXYCODONE HYDROCHLORIDE 10 MG: 5 TABLET ORAL at 14:10

## 2020-03-15 RX ADMIN — METHOCARBAMOL TABLETS 500 MG: 500 TABLET, COATED ORAL at 11:46

## 2020-03-15 RX ADMIN — ALLOPURINOL 200 MG: 100 TABLET ORAL at 05:03

## 2020-03-15 RX ADMIN — ALLOPURINOL 200 MG: 100 TABLET ORAL at 17:36

## 2020-03-15 RX ADMIN — METHOCARBAMOL TABLETS 750 MG: 500 TABLET, COATED ORAL at 17:36

## 2020-03-15 RX ADMIN — OXYCODONE HYDROCHLORIDE 10 MG: 5 TABLET ORAL at 21:30

## 2020-03-15 ASSESSMENT — ENCOUNTER SYMPTOMS
COUGH: 0
CHEST TIGHTNESS: 0
BACK PAIN: 1
NERVOUS/ANXIOUS: 1
NECK PAIN: 0
HALLUCINATIONS: 0
ABDOMINAL PAIN: 0
CHOKING: 0
STRIDOR: 0
SPEECH CHANGE: 0
CHILLS: 1
WEAKNESS: 0
DIARRHEA: 0
FEVER: 1
FOCAL WEAKNESS: 0
VOMITING: 0
DIAPHORESIS: 0
SHORTNESS OF BREATH: 0
APNEA: 0
PALPITATIONS: 0

## 2020-03-15 ASSESSMENT — FIBROSIS 4 INDEX: FIB4 SCORE: 1.07

## 2020-03-15 ASSESSMENT — LIFESTYLE VARIABLES: SUBSTANCE_ABUSE: 0

## 2020-03-15 NOTE — PROGRESS NOTES
Pt complaining oxy 10 Q6 not managing his pain. Day shift RN told this RN that the MD had changed his pain medication orders from Q3 to Q6. I explained this to the MD tonight and he does not want to change the pain med order at this time.

## 2020-03-15 NOTE — PROGRESS NOTES
When pt received 10mg of oxy at 2030 I informed him that we typically do not wake patients in the middle of the night for PRN pain meds if they are asleep. Pt did not say much about that. When I checked on his around 0215 he was asleep so this RN did not bring in meds. At 0405 he had the  call this RN stating he did not receive pain meds all night. I went in with 10mg of oxy and explained to his again that we do not like to wake patients for pain meds if they are asleep and I did check on his at 0215 when he was sleeping. He is now walking around the halls talking with someone on the phone stating this RN told him that he could not have pain meds until 0500. Charge nurse notified and I will pass on to day shift

## 2020-03-15 NOTE — CONSULTS
DATE OF SERVICE:  03/14/2020    CHIEF COMPLAINT:  Endocarditis and need for transesophageal echo.    HISTORY OF PRESENT ILLNESS:  The patient is a very delightful 54-year-old   gentleman seen in consultation at the request of Dr. Mendieta to arrange a   transesophageal echo.  The patient was admitted to this institution on 03/10.    He has had fevers and shaking chills at home.  Blood cultures were drawn in   an urgent care setting the day prior to admission and were positive for   Streptococcus viridans and he was admitted to the hospital.  He has been seen   by ID consultation and because of continued fevers, BEATRIZ is requested to   exclude a perivalvular abscess.    His initial transthoracic echo was performed on the 10th.  This was a good   quality image demonstrating aortic sclerosis without stenosis.  There was also   a large vegetation in the aortic valve with prolapse of the left atrium with   ventricular systole.  Mitral insufficiency was present and at the time of the   last echo was mild.  The patient    DICTATION ENDS HERE       ____________________________________     EPIFANIO THOMAS MD    RPS / NTS    DD:  03/14/2020 19:55:51  DT:  03/14/2020 23:11:08    D#:  [id number]  Job#:  [id number]

## 2020-03-15 NOTE — PROGRESS NOTES
[facility] Medicine Daily Progress Note    Date of Service  3/15/2020    Chief Complaint  54 y.o. male admitted 3/10/2020 with fevers.     Hospital Course    Patient with history of upper teeth extractions with dentures placed, lower tooth dental decay with future dental work planned admitted with fever over a month.  Patient was seen in ER on 3/9/2020 and thought to be viral illness.  Admitted following positive blood cultures and findings consistent with endocarditis.    Interval Problem Update    Recurrent chills. Fevers down . WBC increased.   I discussed with Cardiology - Dr. Reed- unfortunately not enough support today for BEATRIZ - anticipate procedure tomorrow. Repeat blood cultures negative.  Home health ordered for outpatient IV atbs.    Consultants/Specialty  Dr Talavera ID    Code Status  Full     Disposition    Plan continue antibiotics, to be determined.  Will need prolonged IV antibiotics.     Review of Systems  Review of Systems   Constitutional: Positive for chills and fever. Negative for diaphoresis and malaise/fatigue.   Respiratory: Negative for cough and shortness of breath.    Cardiovascular: Negative for chest pain and palpitations.   Gastrointestinal: Negative for abdominal pain, diarrhea and vomiting.   Musculoskeletal: Positive for back pain. Negative for joint pain and neck pain.   Neurological: Negative for speech change, focal weakness and weakness.   Psychiatric/Behavioral: Negative for hallucinations and substance abuse. The patient is nervous/anxious.         Physical Exam  Temp:  [36.4 °C (97.6 °F)-37.6 °C (99.7 °F)] 37.3 °C (99.1 °F)  Pulse:  [111-120] 118  Resp:  [16-18] 18  BP: (124-143)/(82-92) 124/82  SpO2:  [93 %-96 %] 95 %    Physical Exam  Constitutional:       General: He is not in acute distress.     Appearance: He is obese. He is not diaphoretic.   Eyes:      Extraocular Movements: Extraocular movements intact.      Conjunctiva/sclera: Conjunctivae normal.   Neck:       Musculoskeletal: Normal range of motion. No neck rigidity.   Cardiovascular:      Rate and Rhythm: Normal rate and regular rhythm.      Heart sounds: Murmur (3/6 high-pitched systolic ejection murmur) present.   Pulmonary:      Breath sounds: No stridor. No wheezing, rhonchi or rales.   Abdominal:      General: There is no distension.      Palpations: Abdomen is soft.      Tenderness: There is no abdominal tenderness.   Musculoskeletal:         General: No swelling or tenderness (back NT to palpation. ).   Skin:     General: Skin is warm and dry.   Neurological:      General: No focal deficit present.      Mental Status: He is alert and oriented to person, place, and time.      Sensory: No sensory deficit.      Comments: Gait nl .   Psychiatric:         Mood and Affect: Mood normal.         Behavior: Behavior normal.         Fluids    Intake/Output Summary (Last 24 hours) at 3/15/2020 0801  Last data filed at 3/14/2020 1740  Gross per 24 hour   Intake 720 ml   Output --   Net 720 ml       Laboratory  Recent Labs     03/13/20  0446 03/14/20  0316 03/15/20  0409   WBC 11.9* 11.9* 13.6*   RBC 3.46* 3.72* 3.94*   HEMOGLOBIN 11.2* 12.0* 12.5*   HEMATOCRIT 33.4* 35.5* 36.4*   MCV 96.5 95.4 92.4   MCH 32.4 32.3 31.7   MCHC 33.5* 33.8 34.3   RDW 41.9 41.4 40.1   PLATELETCT 262 313 344   MPV 9.4 9.4 9.4     Recent Labs     03/14/20  0316 03/15/20  0409   SODIUM 133* 134*   POTASSIUM 4.9 4.5   CHLORIDE 102 101   CO2 24 22   GLUCOSE 112* 116*   BUN 12 14   CREATININE 0.86 0.94   CALCIUM 9.7 9.6                   Imaging  MR-LUMBAR SPINE-WITH & W/O   Final Result      1.  L5-S1 grade 1-2 spondylolisthesis with bilateral L5 spondylolysis. L4-5 slight degenerative retrolisthesis.   2.  L4-5 minimal disc bulging. Moderate hypertrophic facet arthropathy. Moderate bilateral foraminal stenosis.   3.  L5-S1 mild pseudo-disc bulging associated with spondylolisthesis. Severe bilateral foraminal stenosis due to foraminal deformity  associated with spondylolisthesis.   4.  No evidence of discitis, osteomyelitis, or epidural phlegmon/epidural abscess.      MR-THORACIC SPINE-WITH & W/O   Final Result      1.  T9-T10 small central disc protrusion with slight component of cephalad extrusion. No javon central stenosis.   2.  No evidence of discitis, osteomyelitis, or epidural phlegmon/abscess.      EC-ECHOCARDIOGRAM COMPLETE W/O CONT   Final Result      DX-CHEST-PORTABLE (1 VIEW)   Final Result         No acute cardiac or pulmonary abnormality is identified.      EC-BEATRIZ W/ CONT    (Results Pending)        Assessment/Plan  * Acute bacterial endocarditis- (present on admission)  Assessment & Plan  Blood cultures growing strep species. Anterior Mitral valve vegetation.  S. Virdans from dental infection.   Patient with back pain however MRI of the thoracic and lumbar spine without acute inflammatory changes or signs of infection.  Recommend removal of the remainder of his teeth as an outpatient.  Monitor for signs of acute heart failure.  Telemetry monitoring  Follow-up repeat blood culture final results Plan for Midline if negative  Given persistent fevers- concern for complicated MV lesion, abscess- plan for BEATRIZ . Discussed with cardiology , Dr. Reed.     Sepsis ([facility])- (present on admission)  Assessment & Plan  This is Sepsis Present on admission  SIRS criteria identified on my evaluation include: Fever, with temperature greater than 101 deg F, Tachycardia, with heart rate greater than 90 BPM and Leukocyosis, with WBC greater than 12,000  Source is endocarditis.  Sepsis protocol initiated  Fluid resuscitation ordered per protocol  IV antibiotics as appropriate for source of sepsis  While organ dysfunction may be noted elsewhere in this problem list or in the chart, degree of organ dysfunction does not meet CMS criteria for severe sepsis  S Viridans bacteremia-  Suspect odontogenic source.  IV Rocephin   Prn tylenol for fevers  Additional workup of  MV lesion with BEATRIZ  WBC increased- will monitor.   ID input.             Aortic stenosis  Assessment & Plan  Severe.   BP mildly low-- dc ace inh  Add low dose Toprol Xl.      Back pain  Assessment & Plan  MRI thoracic and lumbar spine with variable spondylolisthesis, disc protrusion, foraminal stenosis.  No signs of acute infection.  Continue PRN oxycodone, IV Dilaudid  Neuro stable. - Multimodality pain control-cw Lidoderm patch, PRN NSAIDs. Increase Robaxin. Add duloxetine .  Wean narcotics as corine    Dental caries  Assessment & Plan  Likely source of bacteremia.  Advised patient he needs to have the remainder of his teeth removed.    Gout- (present on admission)  Assessment & Plan  Continue allopurinol.    Hypertension- (present on admission)  Assessment & Plan  Continue lisinopril.       VTE prophylaxis: SCDs

## 2020-03-15 NOTE — PROGRESS NOTES
Cardiology Follow Up Progress Note    Date of Service  3/15/2020    Attending Physician  Eben Mendieta M.D.    Chief Complaint     Subjective chills, night sweats, worsening fever, lower back pain, diffuse joint pain found to have bacteremia, echo revealed mobile vegetation on the mitral valve.    HPI  Sanjeev Malagon is a 54 y.o. male admitted 3/10/2020 with subjective chills, night sweats, worsening fever, lower back pain, diffuse joint pain        Past medical history significant for upper teeth dental extractions (~8 months ago with dentures placed, his dentist recommended lower teeth extraction as well secondary to dental caries, patient refused) hyperlipidemia, gout, known murmur, hypertension    Interim Events  3/15/2020 sinus rhythm, no overnight cardiac events, emotional this morning, wife on the phone while I was conversing with the patient, patient was experiencing lower back pain overnight and felt he did not receive pain medications on time, currently pain-free, discussed with RN, plan for BEATRIZ in a.m., discussed with patient and his wife, all questions were answered.    Review of Systems  Review of Systems   Respiratory: Negative for apnea, cough, choking, chest tightness, shortness of breath and stridor.    Cardiovascular: Negative for chest pain and leg swelling.       Vital signs in last 24 hours  Temp:  [36.3 °C (97.3 °F)-37.6 °C (99.7 °F)] 36.3 °C (97.3 °F)  Pulse:  [] 69  Resp:  [16-20] 20  BP: ()/(66-92) 92/66  SpO2:  [93 %-96 %] 95 %    Physical Exam  Physical Exam  Cardiovascular:      Rate and Rhythm: Normal rate and regular rhythm.      Pulses: Normal pulses.   Pulmonary:      Effort: Pulmonary effort is normal.   Abdominal:      General: Abdomen is flat.   Skin:     General: Skin is warm.   Neurological:      General: No focal deficit present.      Mental Status: He is alert.   Psychiatric:      Comments: Understandably patient upset and emotional due to his current medical  problems         Lab Review  Lab Results   Component Value Date/Time    WBC 13.6 (H) 03/15/2020 04:09 AM    RBC 3.94 (L) 03/15/2020 04:09 AM    HEMOGLOBIN 12.5 (L) 03/15/2020 04:09 AM    HEMATOCRIT 36.4 (L) 03/15/2020 04:09 AM    MCV 92.4 03/15/2020 04:09 AM    MCH 31.7 03/15/2020 04:09 AM    MCHC 34.3 03/15/2020 04:09 AM    MPV 9.4 03/15/2020 04:09 AM      Lab Results   Component Value Date/Time    SODIUM 134 (L) 03/15/2020 04:09 AM    POTASSIUM 4.5 03/15/2020 04:09 AM    CHLORIDE 101 03/15/2020 04:09 AM    CO2 22 03/15/2020 04:09 AM    GLUCOSE 116 (H) 03/15/2020 04:09 AM    BUN 14 03/15/2020 04:09 AM    CREATININE 0.94 03/15/2020 04:09 AM      Lab Results   Component Value Date/Time    ASTSGOT 49 (H) 03/10/2020 07:04 PM    ALTSGPT 50 03/10/2020 07:04 PM     Lab Results   Component Value Date/Time    TROPONINT 16 03/10/2020 07:04 PM       No results for input(s): NTPROBNP in the last 72 hours.    Cardiac Imaging and Procedures Review  EKG: Sinus rhythm    Echocardiogram: 3/10/2020, LVEF 70%, mobile vegetation seen in the anterior mitral valve leaflet, severe aortic stenosis, peak 104 mmHg, mean 59 mmHg    Cardiac Catheterization: Not applicable    Imaging  Chest X-Ray:     No acute cardiac or pulmonary abnormality is identified.    Stress Test: Not applicable    Assessment/Plan    Mitral valve infective endocarditis  -Plan for BEATRIZ in a.m. (3/16/2020)  -N.p.o. after midnight  -Discussed with patient and wife, all questions were answered  -Viridans Streptococcus bacteremia  -On Rocephin  -Dental caries with recent upper extraction approximately 8 months ago, known to have lower teeth dental caries which patient refused extraction at that time (needs follow-up outpatient dental extraction)  -No fever this morning  -Blood cultures negative from 3/12/2020  -Leukocytosis persists      Severe aortic stenosis  -Likely outpatient follow-up  -Currently compensated      Hypertension  -Lisinopril 10  -Systolic blood pressure  90s  -We will reduce lisinopril dose    Lower back pain  -MRI of thoracic and lumbar spine, negative    Please contact me with any questions.  Will follow    JESSIE Rodas.   Cardiologist, [facility]  [phone]

## 2020-03-15 NOTE — PROGRESS NOTES
Infectious Disease Progress Note    Author: Katia Talavera M.D. Date & Time of service: 3/15/2020  9:53 AM    Chief Complaint:  Follow-up for gram-positive bacteremia/infective endocarditis    Interval History:  3/12 T-max 101.9 WBC 11. 6 repeat blood cultures pending.  Blood cultures on 3/10 growing Streptococcus.  Patient feels better today.  He states his back pain is also improved.  MRI of the thoracic and lumbar spine without any evidence of infection.  3/13 T-max 100.1 WBC 11.9 continues to have low-grade fevers.  He did wake up with night sweats.  However he overall feels well with decreased back pain.  Remains constipated  3/14 Tmax 100.6 WBC 11.9 remains febrile.  Patient not feeling his fevers.  He is frustrated by his course of hospitalization.  Denies any new symptoms.  No improvement in leukocytosis.  Wife and patient with many questions that were addressed.  Plan of care discussed in detail  3/15 T-max 99.7 WBC 13.6 fever curve improved.  Patient sleeping and not arousable to voice    Labs Reviewed, Medications Reviewed and Radiology Reviewed.    Review of Systems:  Review of Systems   Unable to perform ROS: Other       Hemodynamics:  Temp (24hrs), Av °C (98.6 °F), Min:36.3 °C (97.3 °F), Max:37.6 °C (99.7 °F)  Temperature: 36.3 °C (97.3 °F)  Pulse  Av.1  Min: 69  Max: 121   Blood Pressure: (!) 92/66       Physical Exam:  Physical Exam  Constitutional:       Appearance: He is not ill-appearing.   Eyes:      Comments: Eyes closed   Cardiovascular:      Heart sounds: Murmur present.   Musculoskeletal:         General: No swelling.      Right lower leg: No edema.      Left lower leg: No edema.   Neurological:      Comments: Sleeping and not arousable to voice     Limited physical exam as pt sleeping    Meds:    Current Facility-Administered Medications:   •  Notify provider if pain remains uncontrolled **AND** Use the numeric rating scale (NRS-11) on regular floors and Critical-Care Pain  Observation Tool (CPOT) on ICUs/Trauma to assess pain **AND** Pulse Ox (Oximetry) **AND** Pharmacy Consult Request **AND** If patient difficult to arouse and/or has respiratory depression, stop any opiates that are currently infusing and call a Rapid Response. **AND** oxyCODONE immediate-release **AND** [DISCONTINUED] oxyCODONE immediate-release **AND** [DISCONTINUED] HYDROmorphone  •  methocarbamol  •  lidocaine  •  allopurinol  •  lisinopril  •  senna-docusate **AND** polyethylene glycol/lytes **AND** magnesium hydroxide **AND** bisacodyl  •  LR  •  LR  •  acetaminophen  •  ondansetron  •  ondansetron  •  promethazine  •  promethazine  •  prochlorperazine  •  cefTRIAXone (ROCEPHIN) IV    Labs:  Recent Labs     03/13/20  0446 03/14/20  0316 03/15/20  0409   WBC 11.9* 11.9* 13.6*   RBC 3.46* 3.72* 3.94*   HEMOGLOBIN 11.2* 12.0* 12.5*   HEMATOCRIT 33.4* 35.5* 36.4*   MCV 96.5 95.4 92.4   MCH 32.4 32.3 31.7   RDW 41.9 41.4 40.1   PLATELETCT 262 313 344   MPV 9.4 9.4 9.4   NEUTSPOLYS 66.60 66.10 71.20   LYMPHOCYTES 19.60* 19.10* 16.90*   MONOCYTES 9.70 8.80 8.80   EOSINOPHILS 3.00 5.00 1.90   BASOPHILS 0.60 0.70 0.60     Recent Labs     03/14/20  0316 03/15/20  0409   SODIUM 133* 134*   POTASSIUM 4.9 4.5   CHLORIDE 102 101   CO2 24 22   GLUCOSE 112* 116*   BUN 12 14     Recent Labs     03/14/20  0316 03/15/20  0409   CREATININE 0.86 0.94       Imaging:  Dx-chest-2 Views    Result Date: 3/9/2020    3/9/2020 10:57 PM HISTORY/REASON FOR EXAM: COUGH; Cough TECHNIQUE/EXAM DESCRIPTION:  PA and lateral views of the chest. COMPARISON: None FINDINGS: The cardiac silhouette appears within normal limits. The mediastinal contour appears within normal limits.  The central pulmonary vasculature appears normal. The lungs appear well expanded bilaterally.  Bilateral lungs are clear. No significant pleural effusions are identified. The bony structures appear age-appropriate.     1.  No acute cardiopulmonary  disease.    Dx-chest-portable (1 View)    Result Date: 3/10/2020  3/10/2020 6:45 PM HISTORY/REASON FOR EXAM:  Sepsis and shortness of breath TECHNIQUE/EXAM DESCRIPTION AND NUMBER OF VIEWS: Single portable view of the chest. COMPARISON: 03/09/2020 FINDINGS: Heart size is within normal limits. No focal infiltrates or consolidations are identified in the lungs. No pleural fluid collections are identified. No pneumothorax is appreciated.     No acute cardiac or pulmonary abnormality is identified.    Mr-lumbar Spine-with & W/o    Result Date: 3/11/2020  3/11/2020 7:41 PM HISTORY/REASON FOR EXAM:  septic emboli. Low back pain. Fevers. Diffuse joint pain. TECHNIQUE/EXAM DESCRIPTION: MRI of the lumbar spine without and with contrast. The study was performed on a [employer] 1.5 Martha MRI scanner. T1 sagittal, T2 fast spin-echo sagittal, and T2 axial images were obtained of the lumbar spine. T1 post-contrast fat-suppressed sagittal images were obtained. Optional T2 fat-suppressed sagittal and T1 post-contrast axial images may be obtained. 20 mL ProHance gadolinium contrast was administered intravenously. COMPARISON:  MRI of thoracic spine from today's date FINDINGS: Alignment in the lumbar spine shows grade 1-2 spondylolisthesis of L5 on S1. There is bilateral L5 spondylolysis. There is mild degenerative retrolisthesis of L4 on L5. Marrow signal the vertebral bodies shows chronic fatty signal discogenic endplate marrow changes associated with Schmorl's node endplate irregularities at L4-5 and L5-S1. There is no pathologic osseous enhancement. There is no evidence of discitis or osteomyelitis. No evidence of epidural phlegmon or epidural abscess. There is mild degenerative disc space narrowing at L4-5 primarily at the posterior disc space. There is advanced degenerative disc space narrowing at L5-S1. The prevertebral and paraspinous soft tissues are unremarkable. The conus is normal in position and signal with its tip at the  L1 level.  There is no abnormal cord enhancement. There is no abnormal intradural extra medullary enhancement. At T12-L1, no abnormality. At L1-2, no abnormality. At L2-3, no significant disc bulge or protrusion. No central or foraminal stenosis. Mild facet prominence. At L3-4, no significant disc bulge or protrusion. No central or foraminal stenosis. At L4-5, minimal disc bulging. Moderate hypertrophic facet arthropathy. No central stenosis. Moderate bilateral foraminal stenosis. At L5-S1, mild pseudo-disc bulging associated with spondylolisthesis. Severe bilateral foraminal stenosis with horizontal deformity of the neural foramina due to spondylolisthesis. Marked flattening of the exiting L5 nerve roots within the neural foramina.     1.  L5-S1 grade 1-2 spondylolisthesis with bilateral L5 spondylolysis. L4-5 slight degenerative retrolisthesis. 2.  L4-5 minimal disc bulging. Moderate hypertrophic facet arthropathy. Moderate bilateral foraminal stenosis. 3.  L5-S1 mild pseudo-disc bulging associated with spondylolisthesis. Severe bilateral foraminal stenosis due to foraminal deformity associated with spondylolisthesis. 4.  No evidence of discitis, osteomyelitis, or epidural phlegmon/epidural abscess.    Mr-thoracic Spine-with & W/o    Result Date: 3/11/2020  3/11/2020 7:40 PM HISTORY/REASON FOR EXAM:  septic emboli. Worsening low back pain. Fevers. Diffuse joint pain. TECHNIQUE/EXAM DESCRIPTION: MRI of the thoracic spine without and with contrast. The study was performed on a [employer] 1.5 Martha MRI scanner. T1 sagittal, T2 fast spin-echo sagittal, and T2 axial images were obtained of the thoracic spine. T1 post-contrast fat suppressed sagittal images were obtained. Optional T1 post-contrast axial images may be obtained. 20 mL ProHance gadolinium contrast was administered intravenously. COMPARISON:  MRI lumbar spine from today's date FINDINGS: Alignment in the thoracic spine is normal. Marrow signal in the  vertebral bodies is normal. There is no abnormal osseous enhancement. No evidence of discitis or osteomyelitis or epidural phlegmon/abscess. The thoracic spinal cord is normal in caliber and signal throughout its course. There is no abnormal cord enhancement. There is no abnormal intradural extra medullary enhancement. At T9-T10, there is a small central disc protrusion with a slight component of cephalad extrusion (T2 axial image 4, series 11, T2 sagittal image 9, series 10). Partial effacement of ventral subarachnoid space without cord deformity or javon central stenosis. Dorsal subarachnoid space remains generous. The neural foramina are widely patent at all thoracic levels.     1.  T9-T10 small central disc protrusion with slight component of cephalad extrusion. No javon central stenosis. 2.  No evidence of discitis, osteomyelitis, or epidural phlegmon/abscess.    Ec-echocardiogram Complete W/o Cont    Result Date: 3/10/2020  Transthoracic Echo Report Echocardiography Laboratory CONCLUSIONS No prior study is available for comparison. Normal left ventricular chamber size. Moderate concentric left ventricular hypertrophy. Left ventricular ejection fraction is visually estimated to be 70%. Mobile vegetation seen on the anterior mitral valve leaflet. Mild mitral regurgitation. Severe aortic stenosis. Transvalvular gradients are - Peak: 104 mmHg, Mean: 59 mmHg. DEBBIE NIEVES Exam Date:         03/10/2020                    20:57 Exam Location:     Inpatient Priority:          Stat Ordering Physician:        JEFF DAVEY Referring Physician:       [id number] Sonographer:               Ana Ho RDCS Age:    54     Gender:    M MRN:    [id number] :    1965 BSA:    2.07   Ht (in):    69     Wt (lb):    202 Exam Type:     Complete Indications:     Acute rheumatic endocarditis, Endocarditis ICD Codes:       I01.1  421 CPT Codes:       23205 BP:   105    /   65     HR:   97 Technical Quality:        Fair MEASUREMENTS  (Male / Female) Normal Values 2D ECHO LV Diastolic Diameter PLAX        3.5 cm                4.2 - 5.9 / 3.9 - 5.3 cm LV Systolic Diameter PLAX         1.7 cm                2.1 - 4.0 cm IVS Diastolic Thickness           1.4 cm                LVPW Diastolic Thickness          1.5 cm                LVOT Diameter                     2.1 cm                Estimated LV Ejection Fraction    70 %                  LV Ejection Fraction MOD BP       70.6 %                >= 55  % LV Ejection Fraction MOD 4C       76.9 %                LV Ejection Fraction MOD 2C       63.7 %                DOPPLER AV Peak Velocity                  4.8 m/s               AV Peak Gradient                  93.6 mmHg             AV Mean Gradient                  52.8 mmHg             LVOT Peak Velocity                1.3 m/s               AV Area Cont Eq vti               1 cm2                 Mitral E Point Velocity           1.1 m/s               Mitral E to A Ratio               1.2                   MV Pressure Half Time             56 ms                 MV Area PHT                       3.9 cm2               MV Deceleration Time              193 ms                MR ERO PISA                       0.045 cm2             MR Regurgitant Volume PISA        6.2 cm3               TR Peak Velocity                  292 cm/s              PV Peak Velocity                  1.4 m/s               PV Peak Gradient                  7.3 mmHg              RVOT Peak Velocity                0.76 m/s              * Indicates values subject to auto-interpretation LV EF:  70    % FINDINGS Left Ventricle Normal left ventricular chamber size. Moderate concentric left ventricular hypertrophy. Normal left ventricular systolic function. Left ventricular ejection fraction is visually estimated to be 70%. Normal diastolic function. Right Ventricle The right ventricle was normal in size and function. Right Atrium The right atrium is normal in size.   "Normal inferior vena cava size and inspiratory collapse. Left Atrium The left atrium is normal in size.  Left atrial volume index is 32 mL/sq m. Mitral Valve Mobile vegetation seen on the anterior mitral valve leaflet. Mild mitral regurgitation. No mitral stenosis. Aortic Valve Severe aortic stenosis. Vmax is 5.0 m/s. Transvalvular gradients are - Peak: 104 mmHg, Mean: 59 mmHg. Tricuspid Valve Structurally normal tricuspid valve without significant stenosis. Mild tricuspid regurgitation. Estimated right ventricular systolic pressure  is 38 mmHg. Right atrial pressure is estimated to be 3 mmHg. Pulmonic Valve Structurally normal pulmonic valve without significant stenosis or regurgitation. Pericardium Normal pericardium without effusion. Aorta The aortic root is normal. Sebastian Capps M.D. (Electronically Signed) Final Date:     10 March 2020                 22:25      Micro:  Results     Procedure Component Value Units Date/Time    BLOOD CULTURE [[id number]] Collected:  03/12/20 0424    Order Status:  Completed Specimen:  Blood from Peripheral Updated:  03/13/20 0812     Significant Indicator NEG     Source BLD     Site PERIPHERAL     Culture Result No Growth  Note: Blood cultures are incubated for 5 days and  are monitored continuously.Positive blood cultures  are called to the RN and reported as soon as  they are identified.      Narrative:       Per Hospital Policy: Only change Specimen Src: to \"Line\" if  specified by physician order.  Left Hand    BLOOD CULTURE [[id number]] Collected:  03/12/20 0424    Order Status:  Completed Specimen:  Blood from Peripheral Updated:  03/13/20 0812     Significant Indicator NEG     Source BLD     Site PERIPHERAL     Culture Result No Growth  Note: Blood cultures are incubated for 5 days and  are monitored continuously.Positive blood cultures  are called to the RN and reported as soon as  they are identified.      Narrative:       Per Hospital Policy: Only change Specimen Src: to " "\"Line\" if  specified by physician order.  Left AC    URINE CULTURE(NEW) [[id number]] Collected:  03/10/20 1904    Order Status:  Completed Specimen:  Urine Updated:  03/13/20 0748     Significant Indicator NEG     Source UR     Site -     Culture Result No growth at 48 hours.    Narrative:       Indication for culture:->Septic Shock: Persistent  hypotension,  Lactic acid > 4, vasopressors/inotropes started  Indication for culture:->Septic Shock: Persistent    BLOOD CULTURE [[id number]]  (Abnormal) Collected:  03/10/20 1904    Order Status:  Completed Specimen:  Blood from Peripheral Updated:  03/12/20 1117     Significant Indicator POS     Source BLD     Site PERIPHERAL     Culture Result Growth detected by Bactec instrument.  03/11/2020  09:55      Viridans Streptococcus  See previous culture for sensitivity report.      Narrative:       Per Hospital Policy: Only change Specimen Src: to \"Line\" if  specified by physician order.  Right Forearm/Arm    Blood Culture [[id number]] Collected:  03/10/20 2059    Order Status:  Canceled Specimen:  Other from Peripheral     Blood Culture [[id number]] Collected:  03/10/20 2059    Order Status:  Canceled Specimen:  Other from Peripheral     Urinalysis [[id number]] Collected:  03/10/20 2059    Order Status:  Canceled Specimen:  Urine     URINALYSIS [[id number]] Collected:  03/10/20 1904    Order Status:  Completed Specimen:  Urine Updated:  03/10/20 2025     Color Yellow     Character Clear     Specific Gravity 1.014     Ph 7.0     Glucose Negative mg/dL      Ketones Negative mg/dL      Protein Negative mg/dL      Bilirubin Negative     Urobilinogen, Urine 1.0     Nitrite Negative     Leukocyte Esterase Negative     Occult Blood Negative     Micro Urine Req see below     Comment: Microscopic examination not performed when specimen is clear  and chemically negative for protein, blood, leukocyte esterase  and nitrite.         Narrative:       Indication for culture:->Septic Shock: " Persistent  hypotension,  Lactic acid > 4, vasopressors/inotropes started    BLOOD CULTURE [[id number]] Collected:  03/10/20 1832    Order Status:  Canceled Specimen:  Other from Peripheral           Assessment:  Active Hospital Problems    Diagnosis   • *Acute bacterial endocarditis [I33.0]   • Sepsis (HCC) [A41.9]   • Hypertension [I10]   • Gout [M10.9]   • Dental caries [K02.9]       Plan:  Sepsis  Secondary to below  Remains febrile but improving, Tmax 99.7  Leukocytosis - persistent and increasing    Viridans streptococcal bacteremia  Mitral valve infective endocarditis  Oral source  TTE + veg on MV  Blood cultures on 3/10 - Viridans Streptococcus (penicillin intermediate)  Repeat blood cultures on 3/12-negative to date  Continue ceftriaxone 2 g daily  Anticipate a 4-week course of IV antibiotics from date of first negative blood culture  Midline after repeat blood cultures are negative for 48 hours  Plan for BEATRIZ tomorrow  Home and R-OPIC abx orders done 3/13 for price comparison per SW request    Leukocytosis, persistent.  Increased  Secondary to above  On antibiotics above  Monitor    Back pain, improved  MRI of the thoracic and lumbar spine-negative for infection    Dental caries  Upper teeth extraction 8 to 9 months prior to admission  Patient known to have lower teeth dental caries, however refused extraction at that time  Recommend dental extraction while patient is on antibiotic therapy-however it can be done in outpatient setting    I have performed a physical exam and reviewed and updated ROS and plan today 3/15/2020.  In review of yesterday's note 3/14/2020, there are no changes except as documented above.      Disposition: Home IV antibiotics versus [facility]    Follow-up in ID clinic    Discussed with internal medicine/Dr Mnedieta and Dr. Reed, cardiology

## 2020-03-15 NOTE — CARE PLAN
Problem: Safety  Goal: Will remain free from injury  Outcome: PROGRESSING AS EXPECTED  Intervention: Educate patient and significant other/support system about adaptive mobility strategies and safe transfers  Note: Pt mobility assessed at beginning of shift. Pt is up self. Fall precautions in place. Non-slip socks on. Bed in lowest locked position. Call light within reach. Pt educated to call for assistance and verbalizes understanding.      Problem: Knowledge Deficit  Goal: Knowledge of the prescribed therapeutic regimen will improve  Outcome: PROGRESSING AS EXPECTED  Intervention: Discuss information regarding therpeutic regimen and document in education  Note: Pt educated about disease process. Reason why medications are taken. And informed about treatment plan.

## 2020-03-15 NOTE — CONSULTS
DATE OF SERVICE:  03/14/2020    CHIEF COMPLAINT:  Evaluation for transesophageal echo.    HISTORY OF PRESENT ILLNESS:  The patient is a very pleasant 54-year-old   gentleman seen in consultation at the request of Dr. Mendieta for evaluation of a   transesophageal echo.  The patient had fevers and rigors at home.  He was seen   in urgent care on 03/09 where blood cultures were drawn.  These were found to   be positive for Streptococcus species and he was admitted to the hospital on   the 10th.  He has been seen by Infectious Disease and because of continued   fever, BEATRIZ is requested.  In reviewing the patient's records, he had a   temperature of 38.8 early this morning and has been afebrile since.  He had a   transesophageal echo performed on the 10th.  The images were personally   reviewed.  This demonstrates a hyperdynamic LV with ejection fraction of 70%   or greater.  There is a large mobile mass on the mitral valve compatible with   vegetation.  This does prolapse into the left atrium in ventricular systole   and there is mild mitral insufficiency.  The patient also has severe aortic   stenosis.  The aortic valve is thickened with decreased cusp excursion.  The   peak and mean gradients were 94 and 53 respectively with a peak aortic valve   velocity of 4.3 m/sec.    The patient recalls that he had a murmur for many years and he had an echo   performed at [facility] many years ago.  He notes that he has had exertional   dyspnea prior to admission, but no syncope and no chest pain.  His cardiac   risk factor profile is negative for smoking.  There is no history of diabetes.    He does have hyperlipidemia and hypertension.  There is no family history of   premature coronary artery disease.    REVIEW OF SYSTEMS:  CONSTITUTIONAL:  The patient felt ill for several days at home prior to   admission with rigors and fevers.  NEUROLOGIC:  No history of sensory change or weakness.  PULMONARY:  Denies wheezing.  He did feel  short of breath prior to admission.  CARDIAC:  No history of chest pain, no sense of palpitations, no edema.  GASTROINTESTINAL:  No nausea or vomiting.  No difficulty swallowing.  No   esophageal strictures.  No history of liver disease or known esophageal   varices.  GENITOURINARY:  No hematuria.  No flank pain.  ENDOCRINE:  No weight loss.  PSYCHIATRIC:  Denies depression or anxiety.    MEDICATIONS ON ADMISSION:  Allopurinol 100 mg a day, vitamin D, ibuprofen as   needed, lisinopril 10 mg a day, fish oil tablets and red yeast rice extract.      PHYSICAL EXAMINATION:  GENERAL:  Reveals a very pleasant gentleman in no distress.  He is a good   historian.  The patient is in sinus rhythm.  VITAL SIGNS:  Currently he is afebrile.  Blood pressure 128/89.  He has a   resting tachycardia of 100 beats per minute.  HEENT:  Pupils are equal, gaze conjugate.  No scleral icterus.  NECK:  There is no JVD.  Carotid upstroke is delayed.  BACK:  Without deformity.  LUNGS:  Clear.  HEART:  Regular rate and rhythm with III/VI systolic ejection murmur heard at   the right base.  There is also an apical systolic murmur present.  There is an   apical S4 and no S3.  ABDOMEN:  Soft and nontender.  No hepatomegaly.  No pain to palpation.  EXTREMITIES:  No edema.  Posterior tibial pulses are 2+.   SKIN:  No petechiae noted.  NEUROLOGIC:  The patient is alert and cooperative.    PERTINENT LABORATORY:  White count is 11,900, hemoglobin is 12.  Potassium is   4.9, glucose 112.  GFR is greater than 60.    IMPRESSION:  1.  Endocarditis.  The patient has a large mobile vegetation on his mitral   valve.  He has had no clinical emboli from this vegetation, but it is quite   large and prolapses into the left atrium in ventricular systole.  2.  Aortic stenosis, severe by exam and by echocardiogram.  The patient has   delayed carotid upstroke and a harsh systolic murmur heard at the right base.    His Doppler gradients are in excess of 100 mmHg  compatible with critical   aortic stenosis.  He notes he has had a murmur for many years in the past and   did have some shortness of breath prior to admission.    Patient will be scheduled for a transesophageal echocardiogram tomorrow.  This   will allow us to better evaluate the mitral valve and reassess the amount of   mitral regurgitation.  As noted above, the vegetation is quite large and there   is a risk of embolization.  He also has critical aortic stenosis.  The fact   that he has a resting tachycardia is worrisome as he may be decompensating,   although he has no rales, hypotension or other evidence of acute cardiac   decompensation.  He will be monitored closely.  3.  History of hypertension, treated with lisinopril prior to admission.  4.  Hyperlipidemia, treated with over-the-counter medications.  Patient's   prognosis is guarded.       ____________________________________     MD LIZY MCCORD / REINA    DD:  03/14/2020 20:14:47  DT:  03/14/2020 23:45:52    D#:  [id number]  Job#:  [id number]

## 2020-03-15 NOTE — ASSESSMENT & PLAN NOTE
Severe.   BP mildly low-- off ace inh  Add low dose Toprol Xl.    CTS consult.    Cardiac cath today

## 2020-03-15 NOTE — PROGRESS NOTES
Pt's BEATRIZ canceled and moved to Monday because anesthesiologist not at hospital. NPO status changed and tray ordered. Pt was bradycardic in 40s and c/o nausea, and dizziness.  EKG ordered. Notified Tram with cardiology of prolonged ME interval. No orders at this time.

## 2020-03-15 NOTE — CARE PLAN
Problem: Safety  Goal: Will remain free from injury  Note: Pt mobility assessed at beginning of shift. Pt is up self. Fall precautions in place. Non-slip socks on. Bed in lowest locked position. Bed alarm on. Call light within reach. Pt educated to call for assistance and verbalizes understanding.      Problem: Knowledge Deficit  Goal: Knowledge of disease process/condition, treatment plan, diagnostic tests, and medications will improve  Note: Pt educated about disease process. Reason why medications are taken. And informed about treatment plan.

## 2020-03-16 ENCOUNTER — APPOINTMENT (OUTPATIENT)
Dept: RADIOLOGY | Facility: MEDICAL CENTER | Age: 55
DRG: 871 | End: 2020-03-16
Attending: HOSPITALIST
Payer: COMMERCIAL

## 2020-03-16 LAB
ANION GAP SERPL CALC-SCNC: 8 MMOL/L (ref 7–16)
BASOPHILS # BLD AUTO: 0.7 % (ref 0–1.8)
BASOPHILS # BLD: 0.09 K/UL (ref 0–0.12)
BUN SERPL-MCNC: 18 MG/DL (ref 8–22)
CALCIUM SERPL-MCNC: 9.6 MG/DL (ref 8.5–10.5)
CHLORIDE SERPL-SCNC: 101 MMOL/L (ref 96–112)
CO2 SERPL-SCNC: 25 MMOL/L (ref 20–33)
CREAT SERPL-MCNC: 0.98 MG/DL (ref 0.5–1.4)
EOSINOPHIL # BLD AUTO: 0.25 K/UL (ref 0–0.51)
EOSINOPHIL NFR BLD: 2 % (ref 0–6.9)
ERYTHROCYTE [DISTWIDTH] IN BLOOD BY AUTOMATED COUNT: 41.1 FL (ref 35.9–50)
GLUCOSE SERPL-MCNC: 119 MG/DL (ref 65–99)
HCT VFR BLD AUTO: 37.4 % (ref 42–52)
HGB BLD-MCNC: 12.7 G/DL (ref 14–18)
IMM GRANULOCYTES # BLD AUTO: 0.1 K/UL (ref 0–0.11)
IMM GRANULOCYTES NFR BLD AUTO: 0.8 % (ref 0–0.9)
LYMPHOCYTES # BLD AUTO: 2.72 K/UL (ref 1–4.8)
LYMPHOCYTES NFR BLD: 22.2 % (ref 22–41)
MCH RBC QN AUTO: 31.9 PG (ref 27–33)
MCHC RBC AUTO-ENTMCNC: 34 G/DL (ref 33.7–35.3)
MCV RBC AUTO: 94 FL (ref 81.4–97.8)
MONOCYTES # BLD AUTO: 1.28 K/UL (ref 0–0.85)
MONOCYTES NFR BLD AUTO: 10.4 % (ref 0–13.4)
NEUTROPHILS # BLD AUTO: 7.81 K/UL (ref 1.82–7.42)
NEUTROPHILS NFR BLD: 63.9 % (ref 44–72)
NRBC # BLD AUTO: 0 K/UL
NRBC BLD-RTO: 0 /100 WBC
PLATELET # BLD AUTO: 337 K/UL (ref 164–446)
PMV BLD AUTO: 9.3 FL (ref 9–12.9)
POTASSIUM SERPL-SCNC: 5.1 MMOL/L (ref 3.6–5.5)
RBC # BLD AUTO: 3.98 M/UL (ref 4.7–6.1)
SODIUM SERPL-SCNC: 134 MMOL/L (ref 135–145)
WBC # BLD AUTO: 12.3 K/UL (ref 4.8–10.8)

## 2020-03-16 PROCEDURE — 700102 HCHG RX REV CODE 250 W/ 637 OVERRIDE(OP): Performed by: HOSPITALIST

## 2020-03-16 PROCEDURE — 770020 HCHG ROOM/CARE - TELE (206)

## 2020-03-16 PROCEDURE — 700101 HCHG RX REV CODE 250: Performed by: HOSPITALIST

## 2020-03-16 PROCEDURE — 99223 1ST HOSP IP/OBS HIGH 75: CPT | Performed by: THORACIC SURGERY (CARDIOTHORACIC VASCULAR SURGERY)

## 2020-03-16 PROCEDURE — 700105 HCHG RX REV CODE 258: Performed by: INTERNAL MEDICINE

## 2020-03-16 PROCEDURE — 05HB33Z INSERTION OF INFUSION DEVICE INTO RIGHT BASILIC VEIN, PERCUTANEOUS APPROACH: ICD-10-PCS | Performed by: HOSPITALIST

## 2020-03-16 PROCEDURE — A9270 NON-COVERED ITEM OR SERVICE: HCPCS | Performed by: HOSPITALIST

## 2020-03-16 PROCEDURE — C1751 CATH, INF, PER/CENT/MIDLINE: HCPCS

## 2020-03-16 PROCEDURE — 99233 SBSQ HOSP IP/OBS HIGH 50: CPT | Performed by: INTERNAL MEDICINE

## 2020-03-16 PROCEDURE — 700111 HCHG RX REV CODE 636 W/ 250 OVERRIDE (IP): Performed by: INTERNAL MEDICINE

## 2020-03-16 PROCEDURE — 80048 BASIC METABOLIC PNL TOTAL CA: CPT

## 2020-03-16 PROCEDURE — 85025 COMPLETE CBC W/AUTO DIFF WBC: CPT

## 2020-03-16 PROCEDURE — 36415 COLL VENOUS BLD VENIPUNCTURE: CPT

## 2020-03-16 PROCEDURE — 99232 SBSQ HOSP IP/OBS MODERATE 35: CPT | Performed by: HOSPITALIST

## 2020-03-16 RX ORDER — ACETAMINOPHEN 325 MG/1
650 TABLET ORAL EVERY 6 HOURS PRN
Qty: 90 TAB | Refills: 0 | Status: SHIPPED | OUTPATIENT
Start: 2020-03-16

## 2020-03-16 RX ORDER — METHOCARBAMOL 750 MG/1
750 TABLET, FILM COATED ORAL 3 TIMES DAILY PRN
Qty: 90 TAB | Refills: 1 | Status: SHIPPED | OUTPATIENT
Start: 2020-03-16 | End: 2020-04-14

## 2020-03-16 RX ORDER — LIDOCAINE 50 MG/G
1-2 PATCH TOPICAL
Qty: 10 PATCH | Refills: 2 | Status: SHIPPED | OUTPATIENT
Start: 2020-03-16 | End: 2020-04-14

## 2020-03-16 RX ADMIN — OXYCODONE HYDROCHLORIDE 10 MG: 5 TABLET ORAL at 03:29

## 2020-03-16 RX ADMIN — METHOCARBAMOL TABLETS 750 MG: 500 TABLET, COATED ORAL at 17:37

## 2020-03-16 RX ADMIN — METHOCARBAMOL TABLETS 750 MG: 500 TABLET, COATED ORAL at 05:54

## 2020-03-16 RX ADMIN — CEFTRIAXONE SODIUM 2 G: 2 INJECTION, POWDER, FOR SOLUTION INTRAMUSCULAR; INTRAVENOUS at 17:37

## 2020-03-16 RX ADMIN — LIDOCAINE 2 PATCH: 50 PATCH TOPICAL at 17:38

## 2020-03-16 RX ADMIN — ACETAMINOPHEN 1000 MG: 500 TABLET ORAL at 05:54

## 2020-03-16 RX ADMIN — METHOCARBAMOL TABLETS 750 MG: 500 TABLET, COATED ORAL at 13:40

## 2020-03-16 RX ADMIN — ALLOPURINOL 200 MG: 100 TABLET ORAL at 05:54

## 2020-03-16 RX ADMIN — ALLOPURINOL 200 MG: 100 TABLET ORAL at 17:37

## 2020-03-16 RX ADMIN — ACETAMINOPHEN 1000 MG: 500 TABLET ORAL at 13:40

## 2020-03-16 RX ADMIN — OXYCODONE HYDROCHLORIDE 10 MG: 5 TABLET ORAL at 09:33

## 2020-03-16 RX ADMIN — OXYCODONE HYDROCHLORIDE 10 MG: 5 TABLET ORAL at 20:12

## 2020-03-16 RX ADMIN — ACETAMINOPHEN 1000 MG: 500 TABLET ORAL at 21:18

## 2020-03-16 ASSESSMENT — ENCOUNTER SYMPTOMS
STRIDOR: 0
VOMITING: 0
EYE ITCHING: 0
FOCAL WEAKNESS: 0
EYES NEGATIVE: 1
LIGHT-HEADEDNESS: 0
FEVER: 1
NECK PAIN: 0
DIAPHORESIS: 0
AGITATION: 1
DIARRHEA: 0
WEAKNESS: 0
PALPITATIONS: 0
PSYCHIATRIC NEGATIVE: 1
MUSCULOSKELETAL NEGATIVE: 1
SHORTNESS OF BREATH: 1
SHORTNESS OF BREATH: 0
NEUROLOGICAL NEGATIVE: 1
CHILLS: 1
CHILLS: 0
HALLUCINATIONS: 0
NAUSEA: 0
CHEST TIGHTNESS: 0
DIZZINESS: 0
BACK PAIN: 1
COUGH: 0
ABDOMINAL PAIN: 0
NERVOUS/ANXIOUS: 1
GASTROINTESTINAL NEGATIVE: 1
SPEECH CHANGE: 0

## 2020-03-16 ASSESSMENT — LIFESTYLE VARIABLES: SUBSTANCE_ABUSE: 0

## 2020-03-16 ASSESSMENT — FIBROSIS 4 INDEX: FIB4 SCORE: 1.09

## 2020-03-16 NOTE — CARE PLAN
Problem: Knowledge Deficit  Goal: Knowledge of the prescribed therapeutic regimen will improve  Outcome: PROGRESSING AS EXPECTED  Intervention: Discuss information regarding therpeutic regimen and document in education  Note: Pt educated about disease process. Reason why medications are taken. And informed about treatment plan.      Problem: Pain Management  Goal: Pain level will decrease to patient's comfort goal  Outcome: PROGRESSING AS EXPECTED  Intervention: Follow pain managment plan developed in collaboration with patient and Interdisciplinary Team  Note: Medicated per MAR. Educated on pain scale. implemented non pharmacological methods: distraction, reposition, rest.

## 2020-03-16 NOTE — PROGRESS NOTES
"Walked into patients room, patient becoming increasingly irritated. Patient stated \"you got my pain meds? Man, I forgot what you looked liked since I havent seen you all day.\" apologized to patient and said other patients had to be discharged/taken care of. Patient then stated \"So when are my antibiotics due? Because you havent given it to me yet.\" told patient that it is scheduled every 24 hours and will be given at 1800. Patient then stated \"well I'm not going to be staying here till 1800 so you better figure it out!\" Told patient that I will call the doctor and have him come to bedside.  Tiger text sent out to doctor Anam.  "

## 2020-03-16 NOTE — PROGRESS NOTES
Patient is A&Ox4. Discharge instructions. Personal belongings in possession with patient. PIV and tele monitor removed. Copy of discharge instructions in patient chart, signed and reviewed. Patient verbalizes the understanding of the discharge instructions. Questions and concerns addressed prior to leaving the unit. Patient walked off unit, by self. Discharged to home.

## 2020-03-16 NOTE — PROGRESS NOTES
Received report from NOC shift RN. Patient is A&Ox4, resting comfortably in bed, VSS, no signs of distress. Bed in lowest and locked position, call light in reach, will continue to monitor.

## 2020-03-16 NOTE — CARE PLAN
Problem: Safety  Goal: Will remain free from falls  Intervention: Implement fall precautions  Note: Safety precautions and fall prevention in place. Fall prevention education provided. Patient verbalized understanding. Bed in low locked position, bed alarm on, treaded socks on patient, call bell within reach. Patient calls appropriately as needed.      Problem: Pain Management  Goal: Pain level will decrease to patient's comfort goal  Intervention: Educate and implement non-pharmacologic comfort measures. Examples: relaxation, distration, play therapy, activity therapy, massage, etc.  Note: Pain management discussed with patient. Have provided PRN medications per MAR and non-pharmacological interventions as needed. Patient verbalizes understanding of calling for pain needs has notified RN appropriately.

## 2020-03-16 NOTE — CONSULTS
REFERRING PHYSICIAN: Bienvenido Flowers MD.     CONSULTING PHYSICIAN: Sanjeev Herron MD, FACS.    CHIEF COMPLAINT: Fever, malaise, fatigue and endocarditis    HISTORY OF PRESENT ILLNESS: The patient is a 54 y.o. male who has been having fevers for the last month.  He has poor dentition and had all his upper teeth removed but left lower to do at another time.  He started having fever and chills with malaise, fatigue and shortness of breath.  He went to Urgent care on 3/9.  They did blood work to include cultures.  They came back positive for strep viridans and he was called to go to the ED to be admitted.  He states he has had a murmur his entire life.  An echo was done which showed a vegetation on the mitral valve as well as aortic stenosis.  He was admitted and put on antibiotics.  ID was consulted.     PAST MEDICAL HISTORY:   Past Medical History:   Diagnosis Date   • Hyperlipidemia    • Hypertension        PAST SURGICAL HISTORY:   Recent upper teeth removal.    FAMILY HISTORY:   No premature coronary artery disease.     SOCIAL HISTORY:   Social History     Socioeconomic History   • Marital status:      Spouse name: Not on file   • Number of children: Not on file   • Years of education: Not on file   • Highest education level: Not on file   Occupational History   • Not on file   Social Needs   • Financial resource strain: Not on file   • Food insecurity     Worry: Not on file     Inability: Not on file   • Transportation needs     Medical: Not on file     Non-medical: Not on file   Tobacco Use   • Smoking status: Never Smoker   • Smokeless tobacco: Never Used   Substance and Sexual Activity   • Alcohol use: Never     Frequency: Never   • Drug use: Never   • Sexual activity: Not on file   Lifestyle   • Physical activity     Days per week: Not on file     Minutes per session: Not on file   • Stress: Not on file   Relationships   • Social connections     Talks on phone: Not on file     Gets together: Not on file      Attends Yazidi service: Not on file     Active member of club or organization: Not on file     Attends meetings of clubs or organizations: Not on file     Relationship status: Not on file   • Intimate partner violence     Fear of current or ex partner: Not on file     Emotionally abused: Not on file     Physically abused: Not on file     Forced sexual activity: Not on file   Other Topics Concern   • Not on file   Social History Narrative   • Not on file       ALLERGIES:   Allergies   Allergen Reactions   • Morphine Hives        CURRENT MEDICATIONS:     Current Facility-Administered Medications:   •  methocarbamol (ROBAXIN) tablet 750 mg, 750 mg, Oral, TID, Eben Mendieta M.D., 750 mg at 03/16/20 0554  •  Notify provider if pain remains uncontrolled, , , CONTINUOUS **AND** Use the numeric rating scale (NRS-11) on regular floors and Critical-Care Pain Observation Tool (CPOT) on ICUs/Trauma to assess pain, , , CONTINUOUS **AND** Pulse Ox (Oximetry), , , CONTINUOUS **AND** Pharmacy Consult Request ...Pain Management Review 1 Each, 1 Each, Other, PHARMACY TO DOSE **AND** If patient difficult to arouse and/or has respiratory depression, stop any opiates that are currently infusing and call a Rapid Response., , , CONTINUOUS **AND** oxyCODONE immediate-release (ROXICODONE) tablet 5-10 mg, 5-10 mg, Oral, Q6HRS PRN, 10 mg at 03/16/20 0933 **AND** [DISCONTINUED] oxyCODONE immediate release (ROXICODONE) tablet 10 mg, 10 mg, Oral, Q3HRS PRN, 10 mg at 03/13/20 1607 **AND** [DISCONTINUED] HYDROmorphone pf (DILAUDID) injection 0.5 mg, 0.5 mg, Intravenous, Q3HRS PRN, Dorota Rob M.D., 0.5 mg at 03/11/20 0916  •  lidocaine (LIDODERM) 5 % 1-2 Patch, 1-2 Patch, Transdermal, Q24HR, Eben Mendieta M.D., 2 Patch at 03/15/20 1737  •  allopurinol (ZYLOPRIM) tablet 200 mg, 200 mg, Oral, BID, Dorota Rob M.D., 200 mg at 03/16/20 0554  •  senna-docusate (PERICOLACE or SENOKOT S) 8.6-50 MG per tablet 2 Tab, 2 Tab, Oral, BID, 2 Tab at  03/15/20 0503 **AND** polyethylene glycol/lytes (MIRALAX) PACKET 1 Packet, 1 Packet, Oral, QDAY PRN, 1 Packet at 03/14/20 0551 **AND** magnesium hydroxide (MILK OF MAGNESIA) suspension 30 mL, 30 mL, Oral, QDAY PRN, 30 mL at 03/13/20 1049 **AND** bisacodyl (DULCOLAX) suppository 10 mg, 10 mg, Rectal, QDAY PRN, Dorota Rob M.D.  •  lactated ringers infusion (BOLUS), 1,000 mL, Intravenous, Once PRN, Dorota Rob M.D.  •  lactated ringers infusion (BOLUS): BMI less than or equal to 30, 30 mL/kg, Intravenous, Once PRN, Dorota Rob M.D.  •  acetaminophen (TYLENOL) tablet 1,000 mg, 1,000 mg, Oral, Q6HRS PRN, Dorota Rob M.D., 1,000 mg at 03/16/20 0554  •  ondansetron (ZOFRAN) syringe/vial injection 4 mg, 4 mg, Intravenous, Q4HRS PRNDorota M.D., 4 mg at 03/13/20 0640  •  ondansetron (ZOFRAN ODT) dispertab 4 mg, 4 mg, Oral, Q4HRS PRN, Dorota Rob M.D.  •  promethazine (PHENERGAN) tablet 12.5-25 mg, 12.5-25 mg, Oral, Q4HRS PRN, Dorota Rob M.D.  •  promethazine (PHENERGAN) suppository 12.5-25 mg, 12.5-25 mg, Rectal, Q4HRS PRNDoorta M.D.  •  prochlorperazine (COMPAZINE) injection 5-10 mg, 5-10 mg, Intravenous, Q4HRS PRN, Dorota Rob M.D.  •  cefTRIAXone (ROCEPHIN) 2 g in  mL IVPB, 2 g, Intravenous, Q24HRS, Katia Talavera M.D., Stopped at 03/15/20 1806     LABS REVIEWED:  Lab Results   Component Value Date/Time    SODIUM 134 (L) 03/16/2020 04:47 AM    POTASSIUM 5.1 03/16/2020 04:47 AM    CHLORIDE 101 03/16/2020 04:47 AM    CO2 25 03/16/2020 04:47 AM    GLUCOSE 119 (H) 03/16/2020 04:47 AM    BUN 18 03/16/2020 04:47 AM    CREATININE 0.98 03/16/2020 04:47 AM      Lab Results   Component Value Date/Time    PROTHROMBTM 14.7 (H) 03/10/2020 07:04 PM    INR 1.13 03/10/2020 07:04 PM      Lab Results   Component Value Date/Time    WBC 12.3 (H) 03/16/2020 04:47 AM    RBC 3.98 (L) 03/16/2020 04:47 AM    HEMOGLOBIN 12.7 (L) 03/16/2020 04:47 AM    HEMATOCRIT 37.4 (L) 03/16/2020 04:47 AM     "MCV 94.0 03/16/2020 04:47 AM    MCH 31.9 03/16/2020 04:47 AM    MCHC 34.0 03/16/2020 04:47 AM    MPV 9.3 03/16/2020 04:47 AM    NEUTSPOLYS 63.90 03/16/2020 04:47 AM    LYMPHOCYTES 22.20 03/16/2020 04:47 AM    MONOCYTES 10.40 03/16/2020 04:47 AM    EOSINOPHILS 2.00 03/16/2020 04:47 AM    BASOPHILS 0.70 03/16/2020 04:47 AM        IMAGING REVIEWED AND INTERPRETED:    ECHOCARDIOGRAM:   No prior study is available for comparison.   Normal left ventricular chamber size.  Moderate concentric left ventricular hypertrophy.  Left ventricular ejection fraction is visually estimated to be 70%.  Mobile vegetation seen on the anterior mitral valve leaflet.  Mild mitral regurgitation.  Severe aortic stenosis.  Transvalvular gradients are - Peak: 104 mmHg, Mean: 59 mmHg.    Aortic Valve  Severe aortic stenosis. Vmax is 5.0 m/s. Transvalvular gradients are -   Peak: 104 mmHg, Mean: 59 mmHg.    ANGIOGRAM:   pending    REVIEW OF SYSTEMS:   Review of Systems   Constitutional: Positive for chills, fever and malaise/fatigue.   HENT: Negative.    Eyes: Negative.    Respiratory: Positive for shortness of breath.    Cardiovascular: Positive for leg swelling.   Gastrointestinal: Negative.    Genitourinary: Negative.    Musculoskeletal: Negative.    Skin: Negative.    Neurological: Negative.    Endo/Heme/Allergies: Negative.    Psychiatric/Behavioral: Negative.        All other systems were reviewed with the patient and are negative.    PHYSICAL EXAMINATION:   CONSTITUTIONAL:  /75   Pulse 60   Temp 36.4 °C (97.6 °F) (Temporal)   Resp 18   Ht 1.753 m (5' 9\")   Wt 91 kg (200 lb 9.9 oz)   SpO2 98%     Physical Exam   Constitutional: He is oriented to person, place, and time and well-developed, well-nourished, and in no distress.   HENT:   Head: Normocephalic and atraumatic.   Eyes: Pupils are equal, round, and reactive to light.   Neck: Normal range of motion. Neck supple. No JVD present.   Cardiovascular: Normal rate and regular " rhythm.   Murmur heard.  Pulmonary/Chest: Effort normal. No respiratory distress.   Abdominal: Soft. Bowel sounds are normal.   Musculoskeletal: Normal range of motion.         General: No edema.   Neurological: He is alert and oriented to person, place, and time.   Skin: Skin is warm and dry.   Psychiatric: Mood, memory, affect and judgment normal.     IMPRESSION: 54-year-old male with bacterial endocarditis, 1 cm mobile mass on the mitral valve with mild mitral regurgitation and severe aortic stenosis.  Patient will potentially need mitral valve replacement and aortic valve replacement but possibly the mitral valve could be spared if patient responds to antibiotics.    PLAN:  Continue treatment as an outpatient with antibiotics to attempt to sterilize the vegetation for 4 weeks.  Please obtain a transesophageal echo during this hospitalization to rule out possible annular abscess.  Follow-up with Dr. Garcia in 4 weeks for elective aortic valve replacement and possible mitral valve repair or replacement.    The STS mortality risk score is 4% and the morbidity and mortality risk score is 21%. The scores were discussed with patient.    Thank you for this very challenging consultation and participation in the patient’s care.  I will keep you apprised of all future developments.      Sincerely,       Sanjeev Herron MD, FACS.    I,  Sanjeev Herron MD, FACS performed a substantial portion of the EM visit face-to-face with the same patient on the same date of service with, EVITA Reed. I was personally involved in reviewing and interpreting the films and conducted elements of the history and physical exam. I performed all of the medical decision making for the patient.

## 2020-03-16 NOTE — DISCHARGE PLANNING
@9812  Agency/Facility Name: [facility]  Spoke To: Olivia  Outcome: If he has midline in today and discharges they can see him today.    @8762  Agency/Facility Name: [facility]  Spoke To: Olivia  Outcome: Get dose in hospital today and they will see him tomorrow.    @[phone]  Agency/Facility Name: [facility]  Spoke To: Olivia  Outcome: Needs a midline, [phone].    Agency/Facility Name: [facility]  Referral sent per Choice form @ [phone]

## 2020-03-16 NOTE — DISCHARGE PLANNING
JOSE called [facility] to make appointment for patient.     Patient scheduled for [facility]:   [id number] [id number]  [phone]    3/17/2020: 5:00pm

## 2020-03-16 NOTE — PROGRESS NOTES
Cardiology Follow Up Progress Note    Date of Service  3/16/2020    Attending Physician  Eben Mendieta M.D.    Chief Complaint   endocarditis    HPI  Sanjeev Malagon is a 54 y.o. male admitted 3/10/2020 with fevers, found to have bacteremia, mitral valve vegetation, severe aortic stenosis with bicuspid valve.    Past medical history significant for upper teeth dental extractions (~8 months ago with dentures placed, his dentist recommended lower teeth extraction as well secondary to dental caries, patient refused) hyperlipidemia, gout, known murmur, hypertension.    Interim Events  Agitated this morning about the plan for BEATRIZ.  Mild fever overnight, T max 100.4, no chills or night sweats.  No chest pain shortness of breath or syncope while in the hospital. Reports ROSALES prior to hospitalization     SBP     Review of Systems  Review of Systems   Constitutional: Positive for fever. Negative for chills.   Eyes: Negative for itching.   Respiratory: Negative for chest tightness and shortness of breath.    Cardiovascular: Negative for chest pain, palpitations and leg swelling.   Genitourinary: Negative for difficulty urinating.   Neurological: Negative for dizziness, syncope and light-headedness.   Psychiatric/Behavioral: Positive for agitation. The patient is nervous/anxious.        Vital signs in last 24 hours  Temp:  [36.3 °C (97.4 °F)-38 °C (100.4 °F)] 36.4 °C (97.6 °F)  Pulse:  [] 60  Resp:  [17-18] 18  BP: ()/(68-84) 113/75  SpO2:  [93 %-98 %] 98 %    Physical Exam  Physical Exam  Constitutional:       Comments: Agitated on exam   Neck:      Comments: No elevated JVD  Cardiovascular:      Rate and Rhythm: Normal rate and regular rhythm.      Heart sounds: Murmur (high pitched systolic murmur) present.   Pulmonary:      Effort: Pulmonary effort is normal.      Breath sounds: No wheezing or rales.   Abdominal:      General: There is no distension.      Palpations: Abdomen is soft.   Musculoskeletal:       Right lower leg: No edema.      Left lower leg: No edema.   Skin:     General: Skin is warm and dry.   Neurological:      Mental Status: He is alert. Mental status is at baseline.   Psychiatric:         Judgment: Judgment normal.         Lab Review  Lab Results   Component Value Date/Time    WBC 12.3 (H) 03/16/2020 04:47 AM    RBC 3.98 (L) 03/16/2020 04:47 AM    HEMOGLOBIN 12.7 (L) 03/16/2020 04:47 AM    HEMATOCRIT 37.4 (L) 03/16/2020 04:47 AM    MCV 94.0 03/16/2020 04:47 AM    MCH 31.9 03/16/2020 04:47 AM    MCHC 34.0 03/16/2020 04:47 AM    MPV 9.3 03/16/2020 04:47 AM      Lab Results   Component Value Date/Time    SODIUM 134 (L) 03/16/2020 04:47 AM    POTASSIUM 5.1 03/16/2020 04:47 AM    CHLORIDE 101 03/16/2020 04:47 AM    CO2 25 03/16/2020 04:47 AM    GLUCOSE 119 (H) 03/16/2020 04:47 AM    BUN 18 03/16/2020 04:47 AM    CREATININE 0.98 03/16/2020 04:47 AM      Lab Results   Component Value Date/Time    ASTSGOT 49 (H) 03/10/2020 07:04 PM    ALTSGPT 50 03/10/2020 07:04 PM     Lab Results   Component Value Date/Time    TROPONINT 16 03/10/2020 07:04 PM       No results for input(s): NTPROBNP in the last 72 hours.    Cardiac Imaging and Procedures Review  EKG 3/15/20: sinus rhythm with 1st degree AV block (LA 260ms) with early R wave transition    Echocardiogram: 3/10/2020, LVEF 70%, mobile vegetation seen in the anterior mitral valve leaflet, severe aortic stenosis, peak 104 mmHg, mean 59 mmHg    Normal left ventricular chamber size. Moderate concentric left   ventricular hypertrophy. Normal left ventricular systolic function.   Left ventricular ejection fraction is visually estimated to be 70%.   Normal diastolic function.     Right Ventricle  The right ventricle was normal in size and function.     Right Atrium  The right atrium is normal in size.  Normal inferior vena cava size and   inspiratory collapse.     Left Atrium  The left atrium is normal in size.  Left atrial volume index is 32   mL/sq m.     Mitral  Valve  Mobile vegetation seen on the anterior mitral valve leaflet. Mild   mitral regurgitation. No mitral stenosis.     Aortic Valve  Severe aortic stenosis. Vmax is 5.0 m/s. Transvalvular gradients are -   Peak: 104 mmHg, Mean: 59 mmHg.     Tricuspid Valve  Structurally normal tricuspid valve without significant stenosis. Mild   tricuspid regurgitation. Estimated right ventricular systolic pressure    is 38 mmHg. Right atrial pressure is estimated to be 3 mmHg.     Pulmonic Valve  Structurally normal pulmonic valve without significant stenosis or   regurgitation.     Pericardium  Normal pericardium without effusion.     Aorta  The aortic root is normal.    Assessment/Plan  Severe AS  - Vmax is 5.0 m/s. Mean gradient: 59 mmHg, without LV dysfunction  - CTS consult, planning for outpatient surgery  - no chest pain, syncope in the hospital   - plan for LHC tomorrow    Anterior Mitral Valve Vegetation  1st Degree AV Block, ME prolonging  Bacteremia 2/2 Viridans Streptococcus   Leukocytosis  - BC Neg since 3/12  - CTS consult, their recommendations are to obtain BEATRIZ (scheduled for anesthesia 3/17) and continue Iv antibiotics as outpatient  - appreciate ID consultation, ceftriaxone 2 g every 24 hours  -Discussed with hospitalist, plan for midline placement today  - will need LHC prior to valve surgery, will try to arrange tomorrow given the recent cancellation of outpatient procedures      Staffed with Dr. Flowers

## 2020-03-16 NOTE — PROGRESS NOTES
"Dr. Mendieta came to bedside to talk to patient because patient had stated earlier that he was upset that the BEATRIZ was canceled and wants to leave the hospital and wanted to have this RN have MD at bedside. Patient now saying that all he wanted was to talk to  about costs. Patient then started yelling at this RN \"How dare you say that I wanted to leave AMA, that is not how you treat someone!\"  "

## 2020-03-16 NOTE — PROCEDURES
Vascular Access Team    Date of Insertion: 3/16/20  Arm Circumference: 36  Internal length: 15  External Length: 0  Vein Occupancy %: 22  Reason for Midline: Antibiotic therapy  Labs: WBC 12.3, , BUN 18, Cr 0.98, GFR >60, INR 1.13 on 3/10    Orders confirmed, vessel patency confirmed with ultrasound. Risks and benefits of procedure explained to patient and education regarding line associated bloodstream infections provided. Questions answered.     Power Midline placed in RUE per licensed provider order with ultrasound guidance. 4 Fr, Single lumen Power Midline placed in Bacilic vein after 1 attempt(s). 2 mL of 1% lidocaine injected intradermally, 21 gauge microintroducer needle and modified Seldinger technique used. 15 cm catheter inserted with good blood return. Secured at 0 cm marker. Each lumen flushed without resistance with 10 mL 0.9% normal saline. Midline secured with Biopatch and Tegaderm.     Midline placement is confirmed by nurse using ultrasound and ability to flush and draw blood. Midline is appropriate for use at this time.  Patient tolerated procedure well, without complications.  No X-ray is needed for placement confirmation.  Patient condition relayed to unit RN or ordering physician via this post procedure note in the EMR.     Ultrasound images uploaded to PACS and viewable in the EMR - yes  Ultrasound imaged printed and placed in paper chart - no     BARD Power Midline ref # [id number], Lot # [id number], Expiration Date  2/31/21

## 2020-03-16 NOTE — DISCHARGE PLANNING
SW talked with patient regarding D/C plans. Patient would like home infusion, is okay with cost per his conversation with [facility]. Patient verified he received his midline.     SW contacted [facility], they are able to provide infusion today, in home, they will call patient to confirm.     Additionally, RN noted that patient is supposed to get BEATRIZ per cardiology.     SW contacted cardiologist, Dr. Sanjeev Herron to report patient is ready to go, patient declining BEATRIZ. Dr. Preston responded it is okay to D/C patient.     SW updated Dr. Mendieta for D/C summary.

## 2020-03-16 NOTE — PROGRESS NOTES
Infectious Disease Progress Note    Author: Katie Leija M.D. Date & Time of service: 3/16/2020  12:21 PM    Chief Complaint:  Follow-up for strep sepsis/infective endocarditis    Interval History:  55 y/o male admitted 3/10/2020 with above  3/12 T-max 101.9 WBC 11. 6 repeat blood cultures pending.  Blood cultures on 3/10 growing Streptococcus.  Patient feels better today.  He states his back pain is also improved.  MRI of the thoracic and lumbar spine without any evidence of infection.  3/13 T-max 100.1 WBC 11.9 continues to have low-grade fevers.  He did wake up with night sweats.  However he overall feels well with decreased back pain.  Remains constipated  3/14 Tmax 100.6 WBC 11.9 remains febrile.  Patient not feeling his fevers.  He is frustrated by his course of hospitalization.  Denies any new symptoms.  No improvement in leukocytosis.  Wife and patient with many questions that were addressed.  Plan of care discussed in detail  3/15 T-max 99.7 WBC 13.6 fever curve improved.  Patient sleeping and not arousable to voice  3/16 Low grade temp 100.4, WBC 12.3 BEATRIZ cancelled again-frustrated. Awaiting line placement    Labs Reviewed, Medications Reviewed and Radiology Reviewed.    Review of Systems:  Review of Systems   Constitutional: Positive for fever. Negative for chills.   Respiratory: Negative for cough and shortness of breath.    Cardiovascular: Negative for chest pain.   Gastrointestinal: Negative for abdominal pain, diarrhea, nausea and vomiting.   All other systems reviewed and are negative.      Hemodynamics:  Temp (24hrs), Av.2 °C (98.9 °F), Min:36.4 °C (97.6 °F), Max:38 °C (100.4 °F)  Temperature: 36.4 °C (97.6 °F)  Pulse  Av.4  Min: 60  Max: 121   Blood Pressure: 113/75       Physical Exam:  Physical Exam  Vitals signs and nursing note reviewed.   Constitutional:       General: He is not in acute distress.     Appearance: He is not ill-appearing, toxic-appearing or diaphoretic.   HENT:       Nose: No rhinorrhea.      Mouth/Throat:      Pharynx: No oropharyngeal exudate.   Eyes:      General: No scleral icterus.     Extraocular Movements: Extraocular movements intact.      Pupils: Pupils are equal, round, and reactive to light.   Neck:      Musculoskeletal: No neck rigidity.   Cardiovascular:      Rate and Rhythm: Tachycardia present.      Heart sounds: Murmur present.   Pulmonary:      Effort: Pulmonary effort is normal. No respiratory distress.      Breath sounds: No stridor. No wheezing or rhonchi.   Abdominal:      General: There is no distension.      Palpations: Abdomen is soft.      Tenderness: There is no abdominal tenderness.   Musculoskeletal:         General: No swelling.      Right lower leg: No edema.      Left lower leg: No edema.   Skin:     Coloration: Skin is not jaundiced.   Neurological:      General: No focal deficit present.      Mental Status: He is alert and oriented to person, place, and time.      Cranial Nerves: No cranial nerve deficit.   Psychiatric:         Mood and Affect: Mood normal.         Behavior: Behavior normal.         Meds:    Current Facility-Administered Medications:   •  methocarbamol  •  Notify provider if pain remains uncontrolled **AND** Use the numeric rating scale (NRS-11) on regular floors and Critical-Care Pain Observation Tool (CPOT) on ICUs/Trauma to assess pain **AND** Pulse Ox (Oximetry) **AND** Pharmacy Consult Request **AND** If patient difficult to arouse and/or has respiratory depression, stop any opiates that are currently infusing and call a Rapid Response. **AND** oxyCODONE immediate-release **AND** [DISCONTINUED] oxyCODONE immediate-release **AND** [DISCONTINUED] HYDROmorphone  •  lidocaine  •  allopurinol  •  senna-docusate **AND** polyethylene glycol/lytes **AND** magnesium hydroxide **AND** bisacodyl  •  LR  •  LR  •  acetaminophen  •  ondansetron  •  ondansetron  •  promethazine  •  promethazine  •  prochlorperazine  •  cefTRIAXone  (ROCEPHIN) IV    Labs:  Recent Labs     03/14/20  0316 03/15/20  0409 03/16/20  0447   WBC 11.9* 13.6* 12.3*   RBC 3.72* 3.94* 3.98*   HEMOGLOBIN 12.0* 12.5* 12.7*   HEMATOCRIT 35.5* 36.4* 37.4*   MCV 95.4 92.4 94.0   MCH 32.3 31.7 31.9   RDW 41.4 40.1 41.1   PLATELETCT 313 344 337   MPV 9.4 9.4 9.3   NEUTSPOLYS 66.10 71.20 63.90   LYMPHOCYTES 19.10* 16.90* 22.20   MONOCYTES 8.80 8.80 10.40   EOSINOPHILS 5.00 1.90 2.00   BASOPHILS 0.70 0.60 0.70     Recent Labs     03/14/20  0316 03/15/20  0409 03/16/20  0447   SODIUM 133* 134* 134*   POTASSIUM 4.9 4.5 5.1   CHLORIDE 102 101 101   CO2 24 22 25   GLUCOSE 112* 116* 119*   BUN 12 14 18     Recent Labs     03/14/20  0316 03/15/20  0409 03/16/20  0447   CREATININE 0.86 0.94 0.98       Imaging:  Dx-chest-2 Views    Result Date: 3/9/2020    3/9/2020 10:57 PM HISTORY/REASON FOR EXAM: COUGH; Cough TECHNIQUE/EXAM DESCRIPTION:  PA and lateral views of the chest. COMPARISON: None FINDINGS: The cardiac silhouette appears within normal limits. The mediastinal contour appears within normal limits.  The central pulmonary vasculature appears normal. The lungs appear well expanded bilaterally.  Bilateral lungs are clear. No significant pleural effusions are identified. The bony structures appear age-appropriate.     1.  No acute cardiopulmonary disease.    Dx-chest-portable (1 View)    Result Date: 3/10/2020  3/10/2020 6:45 PM HISTORY/REASON FOR EXAM:  Sepsis and shortness of breath TECHNIQUE/EXAM DESCRIPTION AND NUMBER OF VIEWS: Single portable view of the chest. COMPARISON: 03/09/2020 FINDINGS: Heart size is within normal limits. No focal infiltrates or consolidations are identified in the lungs. No pleural fluid collections are identified. No pneumothorax is appreciated.     No acute cardiac or pulmonary abnormality is identified.    Mr-lumbar Spine-with & W/o    Result Date: 3/11/2020  3/11/2020 7:41 PM HISTORY/REASON FOR EXAM:  septic emboli. Low back pain. Fevers. Diffuse joint  pain. TECHNIQUE/EXAM DESCRIPTION: MRI of the lumbar spine without and with contrast. The study was performed on a [employer] 1.5 Martha MRI scanner. T1 sagittal, T2 fast spin-echo sagittal, and T2 axial images were obtained of the lumbar spine. T1 post-contrast fat-suppressed sagittal images were obtained. Optional T2 fat-suppressed sagittal and T1 post-contrast axial images may be obtained. 20 mL ProHance gadolinium contrast was administered intravenously. COMPARISON:  MRI of thoracic spine from today's date FINDINGS: Alignment in the lumbar spine shows grade 1-2 spondylolisthesis of L5 on S1. There is bilateral L5 spondylolysis. There is mild degenerative retrolisthesis of L4 on L5. Marrow signal the vertebral bodies shows chronic fatty signal discogenic endplate marrow changes associated with Schmorl's node endplate irregularities at L4-5 and L5-S1. There is no pathologic osseous enhancement. There is no evidence of discitis or osteomyelitis. No evidence of epidural phlegmon or epidural abscess. There is mild degenerative disc space narrowing at L4-5 primarily at the posterior disc space. There is advanced degenerative disc space narrowing at L5-S1. The prevertebral and paraspinous soft tissues are unremarkable. The conus is normal in position and signal with its tip at the L1 level.  There is no abnormal cord enhancement. There is no abnormal intradural extra medullary enhancement. At T12-L1, no abnormality. At L1-2, no abnormality. At L2-3, no significant disc bulge or protrusion. No central or foraminal stenosis. Mild facet prominence. At L3-4, no significant disc bulge or protrusion. No central or foraminal stenosis. At L4-5, minimal disc bulging. Moderate hypertrophic facet arthropathy. No central stenosis. Moderate bilateral foraminal stenosis. At L5-S1, mild pseudo-disc bulging associated with spondylolisthesis. Severe bilateral foraminal stenosis with horizontal deformity of the neural foramina due to  spondylolisthesis. Marked flattening of the exiting L5 nerve roots within the neural foramina.     1.  L5-S1 grade 1-2 spondylolisthesis with bilateral L5 spondylolysis. L4-5 slight degenerative retrolisthesis. 2.  L4-5 minimal disc bulging. Moderate hypertrophic facet arthropathy. Moderate bilateral foraminal stenosis. 3.  L5-S1 mild pseudo-disc bulging associated with spondylolisthesis. Severe bilateral foraminal stenosis due to foraminal deformity associated with spondylolisthesis. 4.  No evidence of discitis, osteomyelitis, or epidural phlegmon/epidural abscess.    Mr-thoracic Spine-with & W/o    Result Date: 3/11/2020  3/11/2020 7:40 PM HISTORY/REASON FOR EXAM:  septic emboli. Worsening low back pain. Fevers. Diffuse joint pain. TECHNIQUE/EXAM DESCRIPTION: MRI of the thoracic spine without and with contrast. The study was performed on a [employer] 1.5 Martha MRI scanner. T1 sagittal, T2 fast spin-echo sagittal, and T2 axial images were obtained of the thoracic spine. T1 post-contrast fat suppressed sagittal images were obtained. Optional T1 post-contrast axial images may be obtained. 20 mL ProHance gadolinium contrast was administered intravenously. COMPARISON:  MRI lumbar spine from today's date FINDINGS: Alignment in the thoracic spine is normal. Marrow signal in the vertebral bodies is normal. There is no abnormal osseous enhancement. No evidence of discitis or osteomyelitis or epidural phlegmon/abscess. The thoracic spinal cord is normal in caliber and signal throughout its course. There is no abnormal cord enhancement. There is no abnormal intradural extra medullary enhancement. At T9-T10, there is a small central disc protrusion with a slight component of cephalad extrusion (T2 axial image 4, series 11, T2 sagittal image 9, series 10). Partial effacement of ventral subarachnoid space without cord deformity or javon central stenosis. Dorsal subarachnoid space remains generous. The neural foramina are widely  patent at all thoracic levels.     1.  T9-T10 small central disc protrusion with slight component of cephalad extrusion. No javon central stenosis. 2.  No evidence of discitis, osteomyelitis, or epidural phlegmon/abscess.    Ec-echocardiogram Complete W/o Cont    Result Date: 3/10/2020  Transthoracic Echo Report Echocardiography Laboratory CONCLUSIONS No prior study is available for comparison. Normal left ventricular chamber size. Moderate concentric left ventricular hypertrophy. Left ventricular ejection fraction is visually estimated to be 70%. Mobile vegetation seen on the anterior mitral valve leaflet. Mild mitral regurgitation. Severe aortic stenosis. Transvalvular gradients are - Peak: 104 mmHg, Mean: 59 mmHg. LV EF:  70    % FINDINGS Left Ventricle Normal left ventricular chamber size. Moderate concentric left ventricular hypertrophy. Normal left ventricular systolic function. Left ventricular ejection fraction is visually estimated to be 70%. Normal diastolic function. Right Ventricle The right ventricle was normal in size and function. Right Atrium The right atrium is normal in size.  Normal inferior vena cava size and inspiratory collapse. Left Atrium The left atrium is normal in size.  Left atrial volume index is 32 mL/sq m. Mitral Valve Mobile vegetation seen on the anterior mitral valve leaflet. Mild mitral regurgitation. No mitral stenosis. Aortic Valve Severe aortic stenosis. Vmax is 5.0 m/s. Transvalvular gradients are - Peak: 104 mmHg, Mean: 59 mmHg. Tricuspid Valve Structurally normal tricuspid valve without significant stenosis. Mild tricuspid regurgitation. Estimated right ventricular systolic pressure  is 38 mmHg. Right atrial pressure is estimated to be 3 mmHg. Pulmonic Valve Structurally normal pulmonic valve without significant stenosis or regurgitation. Pericardium Normal pericardium without effusion. Aorta The aortic root is normal. Sebastian Capps M.D. (Electronically Signed) Final Date:    "  10 March 2020                 22:25      Micro:  Results     Procedure Component Value Units Date/Time    BLOOD CULTURE [[id number]] Collected:  03/12/20 0424    Order Status:  Completed Specimen:  Blood from Peripheral Updated:  03/13/20 0812     Significant Indicator NEG     Source BLD     Site PERIPHERAL     Culture Result No Growth  Note: Blood cultures are incubated for 5 days and  are monitored continuously.Positive blood cultures  are called to the RN and reported as soon as  they are identified.      Narrative:       Per Hospital Policy: Only change Specimen Src: to \"Line\" if  specified by physician order.  Left Hand    BLOOD CULTURE [[id number]] Collected:  03/12/20 0424    Order Status:  Completed Specimen:  Blood from Peripheral Updated:  03/13/20 0812     Significant Indicator NEG     Source BLD     Site PERIPHERAL     Culture Result No Growth  Note: Blood cultures are incubated for 5 days and  are monitored continuously.Positive blood cultures  are called to the RN and reported as soon as  they are identified.      Narrative:       Per Hospital Policy: Only change Specimen Src: to \"Line\" if  specified by physician order.  Left AC    URINE CULTURE(NEW) [[id number]] Collected:  03/10/20 1904    Order Status:  Completed Specimen:  Urine Updated:  03/13/20 0748     Significant Indicator NEG     Source UR     Site -     Culture Result No growth at 48 hours.    Narrative:       Indication for culture:->Septic Shock: Persistent  hypotension,  Lactic acid > 4, vasopressors/inotropes started  Indication for culture:->Septic Shock: Persistent    BLOOD CULTURE [[id number]]  (Abnormal) Collected:  03/10/20 1904    Order Status:  Completed Specimen:  Blood from Peripheral Updated:  03/12/20 1117     Significant Indicator POS     Source BLD     Site PERIPHERAL     Culture Result Growth detected by Bactec instrument.  03/11/2020  09:55      Viridans Streptococcus  See previous culture for sensitivity report.      " "Narrative:       Per Hospital Policy: Only change Specimen Src: to \"Line\" if  specified by physician order.  Right Forearm/Arm    Blood Culture [[id number]] Collected:  03/10/20 2059    Order Status:  Canceled Specimen:  Other from Peripheral     Blood Culture [[id number]] Collected:  03/10/20 2059    Order Status:  Canceled Specimen:  Other from Peripheral     Urinalysis [[id number]] Collected:  03/10/20 2059    Order Status:  Canceled Specimen:  Urine     URINALYSIS [[id number]] Collected:  03/10/20 1904    Order Status:  Completed Specimen:  Urine Updated:  03/10/20 2025     Color Yellow     Character Clear     Specific Gravity 1.014     Ph 7.0     Glucose Negative mg/dL      Ketones Negative mg/dL      Protein Negative mg/dL      Bilirubin Negative     Urobilinogen, Urine 1.0     Nitrite Negative     Leukocyte Esterase Negative     Occult Blood Negative     Micro Urine Req see below     Comment: Microscopic examination not performed when specimen is clear  and chemically negative for protein, blood, leukocyte esterase  and nitrite.         Narrative:       Indication for culture:->Septic Shock: Persistent  hypotension,  Lactic acid > 4, vasopressors/inotropes started    BLOOD CULTURE [[id number]] Collected:  03/10/20 1832    Order Status:  Canceled Specimen:  Other from Peripheral           Assessment:  Active Hospital Problems    Diagnosis   • *Acute bacterial endocarditis [I33.0]   • Sepsis (HCC) [A41.9]   • Hypertension [I10]   • Gout [M10.9]   • Dental caries [K02.9]       Plan:  Viridans streptococcal sepsis  Mitral valve infective endocarditis  Oral source  TTE + veg on MV  Blood cultures on 3/10 - Viridans Streptococcus (penicillin intermediate)  Repeat blood cultures on 3/12-negative to date  Continue ceftriaxone 2 g daily (KEMAL 0.25)  Anticipate a 4 week course of IV antibiotics from date of first negative blood culture  Midline OK to place  Plan for BEATRIZ   Home and [facility] abx orders done 3/13 for price " comparison per SW request    Leukocytosis, persistent  Secondary to above  On antibiotics above  Monitor    Back pain, improved  MRI of the thoracic and lumbar spine-negative for infection    Dental caries  Upper teeth extraction 8 to 9 months prior to admission  Patient known to have lower teeth dental caries, however refused extraction at that time  Recommend dental extraction while patient is on antibiotic therapy-however it can be done in outpatient setting    I have performed a physical exam and reviewed and updated ROS and plan today 3/16/2020.  In review of yesterday's note 3/15/2020, there are no changes except as documented above.      Disposition: Home IV antibiotics versus [facility]    Follow-up in ID clinic    Discussed with internal medicine/Dr Mendieta

## 2020-03-17 ENCOUNTER — PATIENT OUTREACH (OUTPATIENT)
Dept: HEALTH INFORMATION MANAGEMENT | Facility: OTHER | Age: 55
End: 2020-03-17

## 2020-03-17 ENCOUNTER — ANESTHESIA (OUTPATIENT)
Dept: CARDIOLOGY | Facility: MEDICAL CENTER | Age: 55
DRG: 871 | End: 2020-03-17
Payer: COMMERCIAL

## 2020-03-17 ENCOUNTER — APPOINTMENT (OUTPATIENT)
Dept: CARDIOLOGY | Facility: MEDICAL CENTER | Age: 55
DRG: 871 | End: 2020-03-17
Attending: PHYSICIAN ASSISTANT
Payer: COMMERCIAL

## 2020-03-17 ENCOUNTER — APPOINTMENT (OUTPATIENT)
Dept: CARDIOLOGY | Facility: MEDICAL CENTER | Age: 55
DRG: 871 | End: 2020-03-17
Attending: NURSE PRACTITIONER
Payer: COMMERCIAL

## 2020-03-17 ENCOUNTER — ANESTHESIA EVENT (OUTPATIENT)
Dept: CARDIOLOGY | Facility: MEDICAL CENTER | Age: 55
DRG: 871 | End: 2020-03-17
Payer: COMMERCIAL

## 2020-03-17 VITALS
DIASTOLIC BLOOD PRESSURE: 78 MMHG | BODY MASS INDEX: 28.83 KG/M2 | TEMPERATURE: 98.4 F | HEART RATE: 102 BPM | HEIGHT: 69 IN | OXYGEN SATURATION: 97 % | SYSTOLIC BLOOD PRESSURE: 112 MMHG | WEIGHT: 194.67 LBS | RESPIRATION RATE: 18 BRPM

## 2020-03-17 LAB
ALBUMIN SERPL BCP-MCNC: 3.4 G/DL (ref 3.2–4.9)
ALBUMIN/GLOB SERPL: 0.9 G/DL
ALP SERPL-CCNC: 152 U/L (ref 30–99)
ALT SERPL-CCNC: 37 U/L (ref 2–50)
ANION GAP SERPL CALC-SCNC: 8 MMOL/L (ref 7–16)
AST SERPL-CCNC: 28 U/L (ref 12–45)
BACTERIA BLD CULT: NORMAL
BACTERIA BLD CULT: NORMAL
BASOPHILS # BLD AUTO: 0.7 % (ref 0–1.8)
BASOPHILS # BLD: 0.08 K/UL (ref 0–0.12)
BILIRUB SERPL-MCNC: 0.4 MG/DL (ref 0.1–1.5)
BUN SERPL-MCNC: 18 MG/DL (ref 8–22)
CALCIUM SERPL-MCNC: 9.2 MG/DL (ref 8.5–10.5)
CHLORIDE SERPL-SCNC: 101 MMOL/L (ref 96–112)
CO2 SERPL-SCNC: 24 MMOL/L (ref 20–33)
CREAT SERPL-MCNC: 0.97 MG/DL (ref 0.5–1.4)
EKG IMPRESSION: NORMAL
EOSINOPHIL # BLD AUTO: 0.23 K/UL (ref 0–0.51)
EOSINOPHIL NFR BLD: 1.9 % (ref 0–6.9)
ERYTHROCYTE [DISTWIDTH] IN BLOOD BY AUTOMATED COUNT: 39.9 FL (ref 35.9–50)
GLOBULIN SER CALC-MCNC: 4 G/DL (ref 1.9–3.5)
GLUCOSE SERPL-MCNC: 114 MG/DL (ref 65–99)
HAV IGM SERPL QL IA: NORMAL
HBV CORE IGM SER QL: NORMAL
HBV SURFACE AG SER QL: NORMAL
HCT VFR BLD AUTO: 34.1 % (ref 42–52)
HCV AB SER QL: NORMAL
HGB BLD-MCNC: 11.6 G/DL (ref 14–18)
IMM GRANULOCYTES # BLD AUTO: 0.07 K/UL (ref 0–0.11)
IMM GRANULOCYTES NFR BLD AUTO: 0.6 % (ref 0–0.9)
LV EJECT FRACT  99904: 65
LYMPHOCYTES # BLD AUTO: 2.5 K/UL (ref 1–4.8)
LYMPHOCYTES NFR BLD: 20.5 % (ref 22–41)
MCH RBC QN AUTO: 32 PG (ref 27–33)
MCHC RBC AUTO-ENTMCNC: 34 G/DL (ref 33.7–35.3)
MCV RBC AUTO: 93.9 FL (ref 81.4–97.8)
MONOCYTES # BLD AUTO: 1.31 K/UL (ref 0–0.85)
MONOCYTES NFR BLD AUTO: 10.8 % (ref 0–13.4)
NEUTROPHILS # BLD AUTO: 7.99 K/UL (ref 1.82–7.42)
NEUTROPHILS NFR BLD: 65.5 % (ref 44–72)
NRBC # BLD AUTO: 0 K/UL
NRBC BLD-RTO: 0 /100 WBC
PLATELET # BLD AUTO: 337 K/UL (ref 164–446)
PMV BLD AUTO: 9.4 FL (ref 9–12.9)
POTASSIUM SERPL-SCNC: 4.5 MMOL/L (ref 3.6–5.5)
PROT SERPL-MCNC: 7.4 G/DL (ref 6–8.2)
RBC # BLD AUTO: 3.63 M/UL (ref 4.7–6.1)
SIGNIFICANT IND 70042: NORMAL
SIGNIFICANT IND 70042: NORMAL
SITE SITE: NORMAL
SITE SITE: NORMAL
SODIUM SERPL-SCNC: 133 MMOL/L (ref 135–145)
SOURCE SOURCE: NORMAL
SOURCE SOURCE: NORMAL
WBC # BLD AUTO: 12.2 K/UL (ref 4.8–10.8)

## 2020-03-17 PROCEDURE — A9270 NON-COVERED ITEM OR SERVICE: HCPCS | Performed by: PHYSICIAN ASSISTANT

## 2020-03-17 PROCEDURE — 700102 HCHG RX REV CODE 250 W/ 637 OVERRIDE(OP): Performed by: HOSPITALIST

## 2020-03-17 PROCEDURE — B24BZZ4 ULTRASONOGRAPHY OF HEART WITH AORTA, TRANSESOPHAGEAL: ICD-10-PCS | Performed by: INTERNAL MEDICINE

## 2020-03-17 PROCEDURE — 160002 HCHG RECOVERY MINUTES (STAT)

## 2020-03-17 PROCEDURE — 700101 HCHG RX REV CODE 250

## 2020-03-17 PROCEDURE — 75630 X-RAY AORTA LEG ARTERIES: CPT | Mod: 26,59 | Performed by: INTERNAL MEDICINE

## 2020-03-17 PROCEDURE — B4101ZZ FLUOROSCOPY OF ABDOMINAL AORTA USING LOW OSMOLAR CONTRAST: ICD-10-PCS | Performed by: INTERNAL MEDICINE

## 2020-03-17 PROCEDURE — A9270 NON-COVERED ITEM OR SERVICE: HCPCS | Performed by: HOSPITALIST

## 2020-03-17 PROCEDURE — B3101ZZ FLUOROSCOPY OF THORACIC AORTA USING LOW OSMOLAR CONTRAST: ICD-10-PCS | Performed by: INTERNAL MEDICINE

## 2020-03-17 PROCEDURE — 80074 ACUTE HEPATITIS PANEL: CPT

## 2020-03-17 PROCEDURE — 99153 MOD SED SAME PHYS/QHP EA: CPT

## 2020-03-17 PROCEDURE — 700111 HCHG RX REV CODE 636 W/ 250 OVERRIDE (IP): Performed by: ANESTHESIOLOGY

## 2020-03-17 PROCEDURE — 85025 COMPLETE CBC W/AUTO DIFF WBC: CPT

## 2020-03-17 PROCEDURE — B2111ZZ FLUOROSCOPY OF MULTIPLE CORONARY ARTERIES USING LOW OSMOLAR CONTRAST: ICD-10-PCS | Performed by: INTERNAL MEDICINE

## 2020-03-17 PROCEDURE — 99239 HOSP IP/OBS DSCHRG MGMT >30: CPT | Performed by: INTERNAL MEDICINE

## 2020-03-17 PROCEDURE — 93454 CORONARY ARTERY ANGIO S&I: CPT | Mod: 26 | Performed by: INTERNAL MEDICINE

## 2020-03-17 PROCEDURE — 93312 ECHO TRANSESOPHAGEAL: CPT | Mod: 26 | Performed by: INTERNAL MEDICINE

## 2020-03-17 PROCEDURE — 99232 SBSQ HOSP IP/OBS MODERATE 35: CPT | Performed by: INTERNAL MEDICINE

## 2020-03-17 PROCEDURE — 93312 ECHO TRANSESOPHAGEAL: CPT

## 2020-03-17 PROCEDURE — 80053 COMPREHEN METABOLIC PANEL: CPT

## 2020-03-17 PROCEDURE — 99152 MOD SED SAME PHYS/QHP 5/>YRS: CPT | Performed by: INTERNAL MEDICINE

## 2020-03-17 PROCEDURE — 700117 HCHG RX CONTRAST REV CODE 255: Performed by: INTERNAL MEDICINE

## 2020-03-17 PROCEDURE — 93005 ELECTROCARDIOGRAM TRACING: CPT | Performed by: PHYSICIAN ASSISTANT

## 2020-03-17 PROCEDURE — 700102 HCHG RX REV CODE 250 W/ 637 OVERRIDE(OP): Performed by: PHYSICIAN ASSISTANT

## 2020-03-17 PROCEDURE — 93325 DOPPLER ECHO COLOR FLOW MAPG: CPT | Mod: 26 | Performed by: INTERNAL MEDICINE

## 2020-03-17 PROCEDURE — 93567 NJX CAR CTH SPRVLV AORTGRPHY: CPT | Mod: 59 | Performed by: INTERNAL MEDICINE

## 2020-03-17 PROCEDURE — 700111 HCHG RX REV CODE 636 W/ 250 OVERRIDE (IP)

## 2020-03-17 PROCEDURE — 93010 ELECTROCARDIOGRAM REPORT: CPT | Performed by: INTERNAL MEDICINE

## 2020-03-17 RX ORDER — OXYCODONE HCL 5 MG/5 ML
10 SOLUTION, ORAL ORAL
Status: DISCONTINUED | OUTPATIENT
Start: 2020-03-17 | End: 2020-03-17 | Stop reason: HOSPADM

## 2020-03-17 RX ORDER — METOPROLOL TARTRATE 1 MG/ML
1 INJECTION, SOLUTION INTRAVENOUS
Status: DISCONTINUED | OUTPATIENT
Start: 2020-03-17 | End: 2020-03-17 | Stop reason: HOSPADM

## 2020-03-17 RX ORDER — DIPHENHYDRAMINE HYDROCHLORIDE 50 MG/ML
12.5 INJECTION INTRAMUSCULAR; INTRAVENOUS
Status: DISCONTINUED | OUTPATIENT
Start: 2020-03-17 | End: 2020-03-17 | Stop reason: HOSPADM

## 2020-03-17 RX ORDER — ONDANSETRON 2 MG/ML
4 INJECTION INTRAMUSCULAR; INTRAVENOUS
Status: DISCONTINUED | OUTPATIENT
Start: 2020-03-17 | End: 2020-03-17 | Stop reason: HOSPADM

## 2020-03-17 RX ORDER — VERAPAMIL HYDROCHLORIDE 2.5 MG/ML
INJECTION, SOLUTION INTRAVENOUS
Status: COMPLETED
Start: 2020-03-17 | End: 2020-03-17

## 2020-03-17 RX ORDER — SODIUM CHLORIDE, SODIUM LACTATE, POTASSIUM CHLORIDE, CALCIUM CHLORIDE 600; 310; 30; 20 MG/100ML; MG/100ML; MG/100ML; MG/100ML
INJECTION, SOLUTION INTRAVENOUS CONTINUOUS
Status: DISCONTINUED | OUTPATIENT
Start: 2020-03-17 | End: 2020-03-17 | Stop reason: HOSPADM

## 2020-03-17 RX ORDER — HEPARIN SODIUM,PORCINE 1000/ML
VIAL (ML) INJECTION
Status: COMPLETED
Start: 2020-03-17 | End: 2020-03-17

## 2020-03-17 RX ORDER — LABETALOL HYDROCHLORIDE 5 MG/ML
5 INJECTION, SOLUTION INTRAVENOUS
Status: DISCONTINUED | OUTPATIENT
Start: 2020-03-17 | End: 2020-03-17 | Stop reason: HOSPADM

## 2020-03-17 RX ORDER — ACETAMINOPHEN 500 MG
1000 TABLET ORAL EVERY 4 HOURS PRN
Status: DISCONTINUED | OUTPATIENT
Start: 2020-03-17 | End: 2020-03-17 | Stop reason: HOSPADM

## 2020-03-17 RX ORDER — ASPIRIN 81 MG/1
81 TABLET ORAL DAILY
Qty: 30 TAB | Refills: 0 | Status: SHIPPED | OUTPATIENT
Start: 2020-03-18 | End: 2023-02-24 | Stop reason: SDUPTHER

## 2020-03-17 RX ORDER — OXYCODONE HCL 5 MG/5 ML
5 SOLUTION, ORAL ORAL
Status: DISCONTINUED | OUTPATIENT
Start: 2020-03-17 | End: 2020-03-17 | Stop reason: HOSPADM

## 2020-03-17 RX ORDER — SODIUM CHLORIDE 9 MG/ML
INJECTION, SOLUTION INTRAVENOUS CONTINUOUS
Status: CANCELLED | OUTPATIENT
Start: 2020-03-17 | End: 2020-03-17

## 2020-03-17 RX ORDER — HEPARIN SODIUM 200 [USP'U]/100ML
INJECTION, SOLUTION INTRAVENOUS
Status: COMPLETED
Start: 2020-03-17 | End: 2020-03-17

## 2020-03-17 RX ORDER — MIDAZOLAM HYDROCHLORIDE 1 MG/ML
1 INJECTION INTRAMUSCULAR; INTRAVENOUS
Status: DISCONTINUED | OUTPATIENT
Start: 2020-03-17 | End: 2020-03-17 | Stop reason: HOSPADM

## 2020-03-17 RX ORDER — HALOPERIDOL 5 MG/ML
1 INJECTION INTRAMUSCULAR
Status: DISCONTINUED | OUTPATIENT
Start: 2020-03-17 | End: 2020-03-17 | Stop reason: HOSPADM

## 2020-03-17 RX ORDER — MIDAZOLAM HYDROCHLORIDE 1 MG/ML
INJECTION INTRAMUSCULAR; INTRAVENOUS
Status: COMPLETED
Start: 2020-03-17 | End: 2020-03-17

## 2020-03-17 RX ORDER — LIDOCAINE HYDROCHLORIDE 20 MG/ML
INJECTION, SOLUTION INFILTRATION; PERINEURAL
Status: COMPLETED
Start: 2020-03-17 | End: 2020-03-17

## 2020-03-17 RX ORDER — MEPERIDINE HYDROCHLORIDE 25 MG/ML
12.5 INJECTION INTRAMUSCULAR; INTRAVENOUS; SUBCUTANEOUS
Status: DISCONTINUED | OUTPATIENT
Start: 2020-03-17 | End: 2020-03-17 | Stop reason: HOSPADM

## 2020-03-17 RX ADMIN — OXYCODONE HYDROCHLORIDE 10 MG: 5 TABLET ORAL at 02:11

## 2020-03-17 RX ADMIN — PROPOFOL 70 MG: 10 INJECTION, EMULSION INTRAVENOUS at 11:56

## 2020-03-17 RX ADMIN — PROPOFOL 70 MG: 10 INJECTION, EMULSION INTRAVENOUS at 11:49

## 2020-03-17 RX ADMIN — MIDAZOLAM HYDROCHLORIDE 2 MG: 1 INJECTION, SOLUTION INTRAMUSCULAR; INTRAVENOUS at 10:52

## 2020-03-17 RX ADMIN — PROPOFOL 70 MG: 10 INJECTION, EMULSION INTRAVENOUS at 12:00

## 2020-03-17 RX ADMIN — IOHEXOL 115 ML: 350 INJECTION, SOLUTION INTRAVENOUS at 10:54

## 2020-03-17 RX ADMIN — METHOCARBAMOL TABLETS 750 MG: 500 TABLET, COATED ORAL at 14:18

## 2020-03-17 RX ADMIN — HEPARIN SODIUM 2000 UNITS: 200 INJECTION, SOLUTION INTRAVENOUS at 10:36

## 2020-03-17 RX ADMIN — METHOCARBAMOL TABLETS 750 MG: 500 TABLET, COATED ORAL at 05:20

## 2020-03-17 RX ADMIN — NITROGLYCERIN 10 ML: 20 INJECTION INTRAVENOUS at 10:35

## 2020-03-17 RX ADMIN — FENTANYL CITRATE 100 MCG: 50 INJECTION, SOLUTION INTRAMUSCULAR; INTRAVENOUS at 10:52

## 2020-03-17 RX ADMIN — PROPOFOL 70 MG: 10 INJECTION, EMULSION INTRAVENOUS at 12:06

## 2020-03-17 RX ADMIN — ACETAMINOPHEN 1000 MG: 500 TABLET ORAL at 03:23

## 2020-03-17 RX ADMIN — VERAPAMIL HYDROCHLORIDE 2.5 MG: 2.5 INJECTION, SOLUTION INTRAVENOUS at 10:35

## 2020-03-17 RX ADMIN — LIDOCAINE HYDROCHLORIDE: 20 INJECTION, SOLUTION INFILTRATION; PERINEURAL at 10:35

## 2020-03-17 RX ADMIN — PROPOFOL 70 MG: 10 INJECTION, EMULSION INTRAVENOUS at 11:53

## 2020-03-17 RX ADMIN — HEPARIN SODIUM: 1000 INJECTION, SOLUTION INTRAVENOUS; SUBCUTANEOUS at 10:35

## 2020-03-17 RX ADMIN — ALLOPURINOL 200 MG: 100 TABLET ORAL at 05:20

## 2020-03-17 RX ADMIN — OXYCODONE HYDROCHLORIDE 10 MG: 5 TABLET ORAL at 08:12

## 2020-03-17 RX ADMIN — ASPIRIN 81 MG: 81 TABLET, COATED ORAL at 14:22

## 2020-03-17 ASSESSMENT — ENCOUNTER SYMPTOMS
CHILLS: 1
HALLUCINATIONS: 0
EYE PAIN: 0
SHORTNESS OF BREATH: 0
EYE DISCHARGE: 0
FEVER: 0
DIARRHEA: 0
FEVER: 1
ORTHOPNEA: 0
NAUSEA: 0
NECK PAIN: 0
VOMITING: 0
PALPITATIONS: 0
NERVOUS/ANXIOUS: 1
BACK PAIN: 1
ABDOMINAL PAIN: 0
SPEECH CHANGE: 0
DIAPHORESIS: 0
LIGHT-HEADEDNESS: 0
CONFUSION: 0
CHILLS: 0
COUGH: 0
DIZZINESS: 0
STRIDOR: 0
WEAKNESS: 0
FOCAL WEAKNESS: 0
CLAUDICATION: 0

## 2020-03-17 ASSESSMENT — LIFESTYLE VARIABLES: SUBSTANCE_ABUSE: 0

## 2020-03-17 ASSESSMENT — PAIN SCALES - GENERAL: PAIN_LEVEL: 0

## 2020-03-17 NOTE — ANESTHESIA POSTPROCEDURE EVALUATION
Patient: Sanjeev Malagon    Procedure Summary     Date:  03/17/20 Room / Location:  [facility] - ECHOCARDIOLOGY [facility]    Anesthesia Start:  1141 Anesthesia Stop:  1222    Procedure:  EC-BEATRIZ W/O CONT Diagnosis:                             Scheduled Providers:  Ousmane Flowers M.D.; Josh Shay M.D. Responsible Provider:  Josh Shay M.D.    Anesthesia Type:  general ASA Status:  3          Final Anesthesia Type: general  Last vitals  BP   Blood Pressure: 106/61    Temp   37.1 °C (98.7 °F)    Pulse   Pulse: 86   Resp   15    SpO2   96 %      Anesthesia Post Evaluation    Patient location during evaluation: PACU  Patient participation: complete - patient participated  Level of consciousness: awake and alert  Pain score: 0    Airway patency: patent  Anesthetic complications: no  Cardiovascular status: hemodynamically stable  Respiratory status: acceptable  Hydration status: euvolemic    PONV: none           Nurse Pain Score: 0 (NPRS)

## 2020-03-17 NOTE — ANESTHESIA QCDR
2019 [facility] Clinical Data Registry (for Quality Improvement)     Postoperative nausea/vomiting risk protocol (Adult = 18 yrs and Pediatric 3-17 yrs)- (430 and 463)  General inhalation anesthetic (NOT TIVA) with PONV risk factors: No  Provision of anti-emetic therapy with at least 2 different classes of agents: N/A  Patient DID NOT receive anti-emetic therapy and reason is documented in Medical Record: N/A    Multimodal Pain Management- (477)  Non-emergent surgery AND patient age >= 18: No  Use of Multimodal Pain Management, two or more drugs and/or interventions, NOT including systemic opioids:   Exception: Documented allergy to multiple classes of analgesics:     Smoking Abstinence (404)  Patient is current smoker (cigarette, pipe, e-cig, marijuanna): No  Elective Surgery:   Abstinence instructions provided prior to day of surgery:   Patient abstained from smoking on day of surgery:     Pre-Op Beta-Blocker in Isolated CABG (44)  Isolated CABG AND patient age >= 18: No  Beta-blocker admin within 24 hours of surgical incision:   Exception:of medical reason(s) for not administering beta blocker within 24 hours prior to surgical incision (e.g., not  indicated,other medical reason):     PACU assessment of acute postoperative pain prior to Anesthesia Care End- Applies to Patients Age = 18- (ABG7)  Initial PACU pain score is which of the following: < 7/10  Patient unable to report pain score: N/A    Post-anesthetic transfer of care checklist/protocol to PACU/ICU- (426 and 427)  Upon conclusion of case, patient transferred to which of the following locations:   Use of transfer checklist/protocol:   Exclusion: Service Performed in Patient Hospital Room (and thus did not require transfer):   Unplanned admission to ICU related to anesthesia service up through end of PACU care- (MD51)  Unplanned admission to ICU (not initially anticipated at anesthesia start time): No

## 2020-03-17 NOTE — DISCHARGE SUMMARY
Discharge Summary    CHIEF COMPLAINT ON ADMISSION  Chief Complaint   Patient presents with   • Chest Pain   • Abnormal Labs       Reason for Admission  Chest Pain     Admission Date  3/10/2020    CODE STATUS  Full Code    HPI & HOSPITAL COURSE  This is a 54 y.o. male who presented 3/10/2020 with fevers. He has been having fevers for almost one month. Recently he had all of his upper teeth removed and put in a denture but has been trying to keep his lower teeth though they are diseased as well. For the past month he has had fevers up to 104 degrees and chills with shaking worse at night with malaise and fatigue. He was seen in the ER on 3/9/20 and evaluated, thought to have a viral illness. Then both blood culture bottles grew strep species and he was asked to return to the ER. Pt found to have bacterial endocarditis. Cardiology and infection disease were consulted. Pt was initially started on ceftriaxone and vancomycin. Vancomycin was then later during the course d/c Pt was continued on ceftriaxone. He will need to be on it for 4 weeks from the date that his culturees have been negative (3/12/20).  Cardiology also saw pt and he also has severe aortic stenosis   Pt had cath done today which did not show non obstructive CAD.Cardiothoracic surgery was also consulted and plan is to evaluate pt as outpt after he has finished treatment with abx for his endocarditis.  Home infusion of abx has been arranged for patient. Patient feeling well this AM.  He will be discharged home today with follow up as outpt with infection disease, cardiothoracic surgery and cardiology.    Pt also has dental caries and will need dental extraction as outpt and pt has been informed about this.     The patient met 2-midnight criteria for an inpatient stay at the time of discharge.    Discharge Date  3/17/20    FOLLOW UP ITEMS POST DISCHARGE  Cardiology  Infection disease  Cardiothoracic surgery     DISCHARGE DIAGNOSES  Principal Problem:     Acute bacterial endocarditis POA: Yes  Active Problems:    Sepsis (HCC) POA: Yes    Hypertension POA: Yes    Gout POA: Yes    Dental caries POA: Unknown    Back pain POA: Unknown    Aortic stenosis POA: Unknown  Resolved Problems:    * No resolved hospital problems. *      FOLLOW UP  Future Appointments   Date Time Provider Department Center   3/31/2020 12:40 PM Miah Chaudhary M.D. [facility] None   4/14/2020  2:00 PM Katarina Markham M.D. [facility] None     Katarina Markham M.D.  [address]  [address]  [place] [address]  [phone]    On 4/14/2020      Miah Chaudhary M.D.  [address]  [address]  [place] [address]  [phone]    On 3/31/2020      Lala Arndt D.O.  [address]  [address]  [place] [address]  [phone]    Go on 3/24/2020  Please arrive 7:45 am for your hospital follow up at 8:00 am with Raina Mcmanus. Thank you     Katia Talavera M.D.  [address]  [address]  [place] [id number] [phone]    In 2 weeks      Dentist     In 1 week      [facility]  [address]  [address]  [facility] [id number] [phone]  In 2 weeks        MEDICATIONS ON DISCHARGE     Medication List      START taking these medications      Instructions   acetaminophen 325 MG Tabs  Commonly known as:  TYLENOL   Take 2 Tabs by mouth every 6 hours as needed for Mild Pain or Fever (Mild Pain; (Pain scale 1-3); Temp greater than 100.5 F).  Dose:  650 mg     aspirin 81 MG EC tablet  Start taking on:  March 18, 2020   Take 1 Tab by mouth every day.  Dose:  81 mg     lidocaine 5 % Ptch  Commonly known as:  LIDODERM   Apply 1-2 Patches to skin as directed every 24 hours as needed (back pain.). To lower- mid back  Dose:  1-2 Patch     methocarbamol 750 MG Tabs  Commonly known as:  ROBAXIN   Take 1 Tab by mouth 3 times a day as needed (back pain, spasms).  Dose:  750 mg     NS SOLN 100 mL with cefTRIAXone 2 GM SOLR 2 g   2 g by Intravenous route every 24 hours for 24 days.  Dose:  2 g         CONTINUE taking these medications      Instructions   allopurinol 100 MG Tabs  Commonly known as:  ZYLOPRIM   Take 200 mg by mouth 2 Times a Day.  Dose:  200 mg     fish oil 1000 MG Caps capsule   Take 1,000 mg by mouth every day.  Dose:  1,000 mg     ibuprofen 200 MG Tabs  Commonly known as:  MOTRIN   Take 800 mg by mouth 1 time daily as needed for Mild Pain or Fever.  Dose:  800 mg     multivitamin Tabs   Take 1 Tab by mouth every day.  Dose:  1 Tab     RED YEAST RICE PO   Take 2 Caps by mouth every day.  Dose:  2 Cap     VITAMIN D3 PO   Take 1 Tab by mouth every day.  Dose:  1 Tab        STOP taking these medications    azithromycin 500 MG tablet  Commonly known as:  ZITHROMAX     lisinopril 10 MG Tabs  Commonly known as:  PRINIVIL            Allergies  Allergies   Allergen Reactions   • Morphine Hives       DIET  Orders Placed This Encounter   Procedures   • Diet NPO     Standing Status:   Standing     Number of Occurrences:   8     Order Specific Question:   Restrict to:     Answer:   Sips with Medications [3]       ACTIVITY  As tolerated.  Weight bearing as tolerated    CONSULTATIONS  Cardiology  Infection disease   Cardiothoracic surgery     PROCEDURES  Cardiac cath   BEATRIZ      LABORATORY  Lab Results   Component Value Date    SODIUM 133 (L) 03/17/2020    POTASSIUM 4.5 03/17/2020    CHLORIDE 101 03/17/2020    CO2 24 03/17/2020    GLUCOSE 114 (H) 03/17/2020    BUN 18 03/17/2020    CREATININE 0.97 03/17/2020        Lab Results   Component Value Date    WBC 12.2 (H) 03/17/2020    HEMOGLOBIN 11.6 (L) 03/17/2020    HEMATOCRIT 34.1 (L) 03/17/2020    PLATELETCT 337 03/17/2020        Total time of the discharge process exceeds 41 minutes.

## 2020-03-17 NOTE — PROGRESS NOTES
Cardiology Follow Up Progress Note    Date of Service  3/17/2020    Attending Physician  Eben Mendieta M.D.    Chief Complaint   Endocarditis    HPI  Sanjeev Malagon is a 54 y.o. male admitted 3/10/2020 with fevers, found to have bacteremia, mitral valve vegetation, severe aortic stenosis with bicuspid valve.    Past medical history significant for dental extractions (~8 months ago with dentures placed, his dentist recommended lower teeth extraction as well secondary to dental caries, patient refused) hyperlipidemia, gout, murmur, hypertension.    Interim Events  No fevers overnight. Had LHC this morning, no obstructive CAD, no abscess on BEATRIZ. No dyspnea at rest, orthopnea, leg swelling, chest pain.    SBP controlled, SR/ST     Review of Systems  Review of Systems   Constitutional: Negative for chills and fever.   HENT: Positive for dental problem.    Respiratory: Negative for shortness of breath.    Cardiovascular: Negative for chest pain, palpitations and leg swelling.   Neurological: Negative for dizziness and light-headedness.   Psychiatric/Behavioral: Negative for confusion.       Vital signs in last 24 hours  Temp:  [37.1 °C (98.7 °F)-37.9 °C (100.3 °F)] 37.1 °C (98.7 °F)  Pulse:  [] 86  Resp:  [14-18] 15  BP: (105-142)/(60-95) 106/61  SpO2:  [93 %-98 %] 96 %    Physical Exam  Physical Exam  Constitutional:       Appearance: Normal appearance.   Neck:      Comments: No elevated JVD  Cardiovascular:      Rate and Rhythm: Normal rate and regular rhythm.      Heart sounds: Murmur (high pitched systolic murmur) present.   Pulmonary:      Effort: Pulmonary effort is normal.      Breath sounds: No wheezing or rales.   Abdominal:      General: There is no distension.      Palpations: Abdomen is soft.   Musculoskeletal:      Right lower leg: No edema.      Left lower leg: No edema.      Comments: TR band on right wrist, no hematoma or active bleeding. No pain or sensation changes in hand.    Skin:      General: Skin is warm and dry.   Neurological:      Mental Status: He is alert. Mental status is at baseline.   Psychiatric:         Judgment: Judgment normal.         Lab Review  Lab Results   Component Value Date/Time    WBC 12.2 (H) 03/17/2020 03:33 AM    RBC 3.63 (L) 03/17/2020 03:33 AM    HEMOGLOBIN 11.6 (L) 03/17/2020 03:33 AM    HEMATOCRIT 34.1 (L) 03/17/2020 03:33 AM    MCV 93.9 03/17/2020 03:33 AM    MCH 32.0 03/17/2020 03:33 AM    MCHC 34.0 03/17/2020 03:33 AM    MPV 9.4 03/17/2020 03:33 AM      Lab Results   Component Value Date/Time    SODIUM 133 (L) 03/17/2020 03:33 AM    POTASSIUM 4.5 03/17/2020 03:33 AM    CHLORIDE 101 03/17/2020 03:33 AM    CO2 24 03/17/2020 03:33 AM    GLUCOSE 114 (H) 03/17/2020 03:33 AM    BUN 18 03/17/2020 03:33 AM    CREATININE 0.97 03/17/2020 03:33 AM      Lab Results   Component Value Date/Time    ASTSGOT 28 03/17/2020 03:33 AM    ALTSGPT 37 03/17/2020 03:33 AM     Lab Results   Component Value Date/Time    TROPONINT 16 03/10/2020 07:04 PM       No results for input(s): NTPROBNP in the last 72 hours.    Cardiac Imaging and Procedures Review  EKG 3/15/20: sinus rhythm with 1st degree AV block (WI 260ms) with early R wave transition    EKG 3/17/20: Sinus rhythm with 1st degree AV block (WI 259ms)     C 3/17/20  FINDINGS:  I. HEMODYNAMICS:               Ao: 113/79 mmHg                II. CORONARY ANGIOGRAPHY:  Left Main: Moderate caliber short bifurcating no CAD.  Left Anterior Descending: Moderate caliber.  Moderate caliber first diagonal.  There is mild to moderate diffuse proximal to mid LAD CAD but no obstructive disease.  Left Circumflex: Large caliber dominant mild nonobstructive CAD.  Right Coronary: Moderate caliber nondominant mild nonobstructive CAD.     POSTOPERATIVE DIAGNOSIS:  1.  Nonobstructive CAD.  2.  Normal caliber ascending arch and descending thoracic aorta and normal caliber abdominal aortoiliac system.  3.  Mild to moderate aortic  insufficiency.    Echocardiogram: 3/10/2020, LVEF 70%, mobile vegetation seen in the anterior mitral valve leaflet, severe aortic stenosis, peak 104 mmHg, mean 59 mmHg    Normal left ventricular chamber size. Moderate concentric left   ventricular hypertrophy. Normal left ventricular systolic function.   Left ventricular ejection fraction is visually estimated to be 70%.   Normal diastolic function.     Right Ventricle  The right ventricle was normal in size and function.     Right Atrium  The right atrium is normal in size.  Normal inferior vena cava size and   inspiratory collapse.     Left Atrium  The left atrium is normal in size.  Left atrial volume index is 32   mL/sq m.     Mitral Valve  Mobile vegetation seen on the anterior mitral valve leaflet. Mild   mitral regurgitation. No mitral stenosis.     Aortic Valve  Severe aortic stenosis. Vmax is 5.0 m/s. Transvalvular gradients are -   Peak: 104 mmHg, Mean: 59 mmHg.     Tricuspid Valve  Structurally normal tricuspid valve without significant stenosis. Mild   tricuspid regurgitation. Estimated right ventricular systolic pressure    is 38 mmHg. Right atrial pressure is estimated to be 3 mmHg.     Pulmonic Valve  Structurally normal pulmonic valve without significant stenosis or   regurgitation.     Pericardium  Normal pericardium without effusion.     Aorta  The aortic root is normal.    Assessment/Plan  Severe AS  - Vmax is 5.0 m/s. Mean gradient: 59 mmHg, without LV dysfunction  - CTS consult, planning for outpatient surgery  - no chest pain, syncope in the hospital   - [facility] today showed nonobstructive CAD, go home on aspirin 81mg, check lipid panel as outpatient    Aortic Valve Vegetation  1st Degree AV Block, NC prolonging  Bacteremia 2/2 Viridans Streptococcus   Leukocytosis  - BC Neg since 3/12  - CTS consult recommend outpatient follow up after completing 4 week course of abx  - BEATRIZ showed tricuspid valve, no abscess, large aortic valve vegetation   -  appreciate ID consultation, ceftriaxone 2 g every 24 hours for 4 weeks after 3/12 (date of neg BC)  -Midline placed 3/16 has HH for daily ABX infusion    Dental Caries  - Recommend dental extraction while patient is on antibiotic therapy-however it can be done in outpatient setting      Future Appointments   Date Time Provider Department Center   3/31/2020 12:40 PM Miah Chaudhary M.D. [facility] None   4/14/2020  2:00 PM Katarina Markham M.D. [facility] None       Staffed with Dr. Flowers

## 2020-03-17 NOTE — PROCEDURES
REFERRING PHYSICIAN: Dr. Flowers    PREOPERATIVE DIAGNOSIS:  1.  Severe aortic stenosis    POSTOPERATIVE DIAGNOSIS:  1.  Nonobstructive CAD.  2.  Normal caliber ascending arch and descending thoracic aorta and normal caliber abdominal aortoiliac system.  3.  Mild to moderate aortic insufficiency.      PROCEDURE PERFORMED:  Selective coronary angiography of the native vessels  Coronary angiography  Supervision moderate sedation  Supravalvular aortography  Abdominal aortography with runoff    DESCRIPTION OF PROCEDURE:  The risks and benefits of cardiac catheterization as well as the procedure itself, rationale and appropriateness were discussed with the patient today. Complications including but not limited to death, stroke, MI, urgent bypass surgery, contrast nephropathy, vascular complications, bleeding and infection were explained to the patient. The potential outcomes associated with the procedure (possible PCI, possible CABG, possible medical Rx only) were also discussed at length. The patient agreed to proceed.    The patient was transported to the catheterization laboratory in the fasting state. The right radial area and right groin were prepped and draped in the usual fashion. Right radial area was entered with a single through and through puncture under direct ultrasound guidance and a 6F glide sheath was placed. An intra-arterial cocktail of heparin, verapamil and nitroglycerine was administered. Over a wire, a 6 [place] JL 3.5 and JR4 diagnostic catheter was passed to the aortic root and used to engage the right and left coronaries without difficulty. Contrast was administered and multiple images obtained. This catheter was then changed for 6 [place] angled pigtail used seated above the aortic valve.  Contrast administered at NC using 30 cc for ascending arch descending thoracic aortogram.  Then repositioned to the distal abdominal aorta and another injection of 15 cc/s for 30 cc was completed for abdominal  aortogram with distal runoff.. All catheters and guidewires were removed and a TR band was applied to achieve patent hemostasis. Patient left the cath lab in stable condition.      Moderate sedation directly monitored by me during the case while supervising the administration of the sedation medication by an independent trained RN to assist in the monitoring of the patient's level of consciousness and physiological status. I, the supervising physician was present the entire time from beginning of medication administration until the end of the procedure from 1039 until 1055. For detailed administration records please see the moderate sedation documentation in the median tab.      FINDINGS:  I. HEMODYNAMICS:    Ao: 113/79 mmHg     II. CORONARY ANGIOGRAPHY:  Left Main: Moderate caliber short bifurcating no CAD.  Left Anterior Descending: Moderate caliber.  Moderate caliber first diagonal.  There is mild to moderate diffuse proximal to mid LAD CAD but no obstructive disease.  Left Circumflex: Large caliber dominant mild nonobstructive CAD.  Right Coronary: Moderate caliber nondominant mild nonobstructive CAD.    COMPLICATIONS: none apparent    CONCLUSIONS:  1.  Nonobstructive CAD.  2.  Normal caliber ascending arch and descending thoracic aorta and normal caliber abdominal aortoiliac system.  3.  Mild to moderate aortic insufficiency.

## 2020-03-17 NOTE — PROGRESS NOTES
[facility] Medicine Daily Progress Note    Date of Service  3/17/2020    Chief Complaint  54 y.o. male admitted 3/10/2020 with fevers.     Hospital Course    Patient with history of upper teeth extractions with dentures placed, lower tooth dental decay with future dental work planned admitted with fever over a month.  Patient was seen in ER on 3/9/2020 and thought to be viral illness.  Admitted following positive blood cultures and findings consistent with endocarditis.    Interval Problem Update  Pt seen and examined, no acute events overnight, afebrile, plan for LHC and BEATRIZ today.  Denies CP, no nausea or vomiting.     Consultants/Specialty  Dr Ilan VALENTIN  Cardiology   CTS    Code Status  Full     Disposition    Plan continue antibiotics, to be determined.  Will need prolonged IV antibiotics.     Review of Systems  Review of Systems   Constitutional: Positive for chills and fever. Negative for diaphoresis and malaise/fatigue.        Improved    HENT: Negative for congestion, ear discharge and ear pain.    Eyes: Negative for pain and discharge.   Respiratory: Negative for cough, shortness of breath and stridor.    Cardiovascular: Negative for chest pain, palpitations, orthopnea and claudication.   Gastrointestinal: Negative for abdominal pain, diarrhea and vomiting.   Musculoskeletal: Positive for back pain. Negative for joint pain and neck pain.   Neurological: Negative for speech change, focal weakness and weakness.   Psychiatric/Behavioral: Negative for hallucinations and substance abuse. The patient is nervous/anxious.    All other systems reviewed and are negative.       Physical Exam  Temp:  [36.7 °C (98.1 °F)-37.9 °C (100.3 °F)] 36.7 °C (98.1 °F)  Pulse:  [] 88  Resp:  [14-18] 18  BP: ()/(43-80) 111/56  SpO2:  [93 %-100 %] 93 %    Physical Exam  Vitals signs and nursing note reviewed.   Constitutional:       General: He is not in acute distress.     Appearance: He is obese. He is not diaphoretic.    Eyes:      General: No scleral icterus.     Extraocular Movements: Extraocular movements intact.      Conjunctiva/sclera: Conjunctivae normal.   Neck:      Musculoskeletal: Normal range of motion. No neck rigidity.   Cardiovascular:      Rate and Rhythm: Normal rate and regular rhythm.      Heart sounds: Murmur (3/6 high-pitched systolic ejection murmur) present.   Pulmonary:      Breath sounds: No stridor. No wheezing, rhonchi or rales.   Abdominal:      General: There is no distension.      Palpations: Abdomen is soft.      Tenderness: There is no abdominal tenderness. There is no guarding or rebound.   Musculoskeletal:         General: No swelling or tenderness.   Skin:     General: Skin is warm and dry.   Neurological:      General: No focal deficit present.      Mental Status: He is alert and oriented to person, place, and time.      Sensory: No sensory deficit.      Comments: Gait nl .   Psychiatric:         Mood and Affect: Mood normal.         Behavior: Behavior normal.         Fluids    Intake/Output Summary (Last 24 hours) at 3/17/2020 1341  Last data filed at 3/17/2020 1220  Gross per 24 hour   Intake 600 ml   Output --   Net 600 ml       Laboratory  Recent Labs     03/15/20  0409 03/16/20  0447 03/17/20  0333   WBC 13.6* 12.3* 12.2*   RBC 3.94* 3.98* 3.63*   HEMOGLOBIN 12.5* 12.7* 11.6*   HEMATOCRIT 36.4* 37.4* 34.1*   MCV 92.4 94.0 93.9   MCH 31.7 31.9 32.0   MCHC 34.3 34.0 34.0   RDW 40.1 41.1 39.9   PLATELETCT 344 337 337   MPV 9.4 9.3 9.4     Recent Labs     03/15/20  0409 03/16/20  0447 03/17/20  0333   SODIUM 134* 134* 133*   POTASSIUM 4.5 5.1 4.5   CHLORIDE 101 101 101   CO2 22 25 24   GLUCOSE 116* 119* 114*   BUN 14 18 18   CREATININE 0.94 0.98 0.97   CALCIUM 9.6 9.6 9.2                   Imaging  EC-BEATRIZ W/O CONT         IR-MIDLINE CATHETER INSERTION WO GUIDANCE > AGE 3   Final Result                  Ultrasound-guided midline placement performed by qualified nursing staff    as above.           MR-LUMBAR SPINE-WITH & W/O   Final Result      1.  L5-S1 grade 1-2 spondylolisthesis with bilateral L5 spondylolysis. L4-5 slight degenerative retrolisthesis.   2.  L4-5 minimal disc bulging. Moderate hypertrophic facet arthropathy. Moderate bilateral foraminal stenosis.   3.  L5-S1 mild pseudo-disc bulging associated with spondylolisthesis. Severe bilateral foraminal stenosis due to foraminal deformity associated with spondylolisthesis.   4.  No evidence of discitis, osteomyelitis, or epidural phlegmon/epidural abscess.      MR-THORACIC SPINE-WITH & W/O   Final Result      1.  T9-T10 small central disc protrusion with slight component of cephalad extrusion. No javon central stenosis.   2.  No evidence of discitis, osteomyelitis, or epidural phlegmon/abscess.      EC-ECHOCARDIOGRAM COMPLETE W/O CONT   Final Result      DX-CHEST-PORTABLE (1 VIEW)   Final Result         No acute cardiac or pulmonary abnormality is identified.      CL-LEFT HEART CATHETERIZATION WITH POSSIBLE INTERVENTION    (Results Pending)        Assessment/Plan  * Acute bacterial endocarditis- (present on admission)  Assessment & Plan  Blood cultures growing strep species. Anterior Mitral valve vegetation.  S. Virdans from dental infection.   Patient with back pain however MRI of the thoracic and lumbar spine without acute inflammatory changes or signs of infection.  Recommend removal of the remainder of his teeth as an outpatient.  Monitor for signs of acute heart failure.  Telemetry monitoring  Follow-up repeat blood cultures negative - midline placed.  Given persistent fevers- concern for complicated MV lesion, abscess- plan for BETARIZ today     Sepsis (HCC)- (present on admission)  Assessment & Plan  This is Sepsis Present on admission  SIRS criteria identified on my evaluation include: Fever, with temperature greater than 101 deg F, Tachycardia, with heart rate greater than 90 BPM and Leukocyosis, with WBC greater than 12,000  Source is  endocarditis.  Sepsis protocol initiated  Fluid resuscitation ordered per protocol  IV antibiotics as appropriate for source of sepsis  While organ dysfunction may be noted elsewhere in this problem list or in the chart, degree of organ dysfunction does not meet CMS criteria for severe sepsis  S Viridans bacteremia-  Suspect odontogenic source.  Continue IV Rocephin . Midline placed.  Will arrange for home infusion.   Prn tylenol for fevers  Additional workup of MV lesion with BEATRIZ tomorrow.  WBC increased- will monitor.   ID input.             Aortic stenosis  Assessment & Plan  Severe.   BP mildly low-- off ace inh  Add low dose Toprol Xl.    CTS consult.    Cardiac cath today      Back pain  Assessment & Plan  MRI thoracic and lumbar spine with variable spondylolisthesis, disc protrusion, foraminal stenosis.  No signs of acute infection.  Continue PRN oxycodone, IV Dilaudid  Neuro stable. - Multimodality pain control-cw Lidoderm patch, Duloxetine, Robaxin.   Wean narcotics as corine    Dental caries  Assessment & Plan  Likely source of bacteremia.  Advised patient he needs to have the remainder of his teeth removed.     Gout- (present on admission)  Assessment & Plan  Continue allopurinol.    Hypertension- (present on admission)  Assessment & Plan  Continue lisinopril.       VTE prophylaxis: SCDs    Case discussed with CTS and cardiology .

## 2020-03-17 NOTE — ANESTHESIA TIME REPORT
Anesthesia Start and Stop Event Times     Date Time Event    3/17/2020 1141 Ready for Procedure     1141 Anesthesia Start        Responsible Staff  03/17/20    Name Role Begin End    Josh Shay M.D. Anesth 1141         Preop Diagnosis (Free Text):  Pre-op Diagnosis             Preop Diagnosis (Codes):    Post op Diagnosis  Endocarditis      Premium Reason  Non-Premium    Comments:

## 2020-03-17 NOTE — DISCHARGE INSTRUCTIONS
Please schedule your dental extractions within the next 2-3 weeks while you are on IV antibiotics     Discharge Instructions    Discharged to home by car with relative. Discharged via wheelchair, hospital escort: Yes.  Special equipment needed: Not Applicable    Be sure to schedule a follow-up appointment with your primary care doctor or any specialists as instructed.     Discharge Plan:   Diet Plan: Discussed  Activity Level: Discussed  Confirmed Follow up Appointment: Appointment Scheduled  Confirmed Symptoms Management: Discussed  Medication Reconciliation Updated: Yes  Influenza Vaccine Indication: Not indicated: Previously immunized this influenza season and > 8 years of age    I understand that a diet low in cholesterol, fat, and sodium is recommended for good health. Unless I have been given specific instructions below for another diet, I accept this instruction as my diet prescription.   Other diet: Regular    Special Instructions: Sepsis, Adult  Sepsis is a serious infection of your blood or tissues that affects your whole body. The infection that causes sepsis may be bacterial, viral, fungal, or parasitic. Sepsis may be life threatening. Sepsis can cause your blood pressure to drop. This may result in shock. Shock causes your central nervous system and your organs to stop working correctly.  What increases the risk?  Sepsis can happen in anyone, but it is more likely to happen in people who have weakened immune systems.  What are the signs or symptoms?  Symptoms of sepsis can include:  · Fever or low body temperature (hypothermia).  · Rapid breathing (hyperventilation).  · Chills.  · Rapid heartbeat (tachycardia).  · Confusion or light-headedness.  · Trouble breathing.  · Urinating much less than usual.  · Cool, clammy skin or red, flushed skin.  · Other problems with the heart, kidneys, or brain.  How is this diagnosed?  Your health care provider will likely do tests to look for an infection, to see if  the infection has spread to your blood, and to see how serious your condition is. Tests can include:  · Blood tests, including cultures of your blood.  · Cultures of other fluids from your body, such as:  ¨ Urine.  ¨ Pus from wounds.  ¨ Mucus coughed up from your lungs.  · Urine tests other than cultures.  · X-ray exams or other imaging tests.  How is this treated?  Treatment will begin with elimination of the source of infection. If your sepsis is likely caused by a bacterial or fungal infection, you will be given antibiotic or antifungal medicines.  You may also receive:  · Oxygen.  · Fluids through an IV tube.  · Medicines to increase your blood pressure.  · A machine to clean your blood (dialysis) if your kidneys fail.  · A machine to help you breathe if your lungs fail.  Get help right away if:  You get an infection or develop any of the signs and symptoms of sepsis after surgery or a hospitalization.  This information is not intended to replace advice given to you by your health care provider. Make sure you discuss any questions you have with your health care provider.  Document Released: 09/15/2004 Document Revised: 05/25/2017 Document Reviewed: 08/25/2014  [employer] Interactive Patient Education © 2017 [employer]      · Is patient discharged on Warfarin / Coumadin?   No     Depression / Suicide Risk    As you are discharged from this [facility] facility, it is important to learn how to keep safe from harming yourself.    Recognize the warning signs:  · Abrupt changes in personality, positive or negative- including increase in energy   · Giving away possessions  · Change in eating patterns- significant weight changes-  positive or negative  · Change in sleeping patterns- unable to sleep or sleeping all the time   · Unwillingness or inability to communicate  · Depression  · Unusual sadness, discouragement and loneliness  · Talk of wanting to die  · Neglect of personal appearance   · Rebelliousness-  reckless behavior  · Withdrawal from people/activities they love  · Confusion- inability to concentrate     If you or a loved one observes any of these behaviors or has concerns about self-harm, here's what you can do:  · Talk about it- your feelings and reasons for harming yourself  · Remove any means that you might use to hurt yourself (examples: pills, rope, extension cords, firearm)  · Get professional help from the community (Mental Health, Substance Abuse, psychological counseling)  · Do not be alone:Call your Safe Contact- someone whom you trust who will be there for you.  · Call your local CRISIS HOTLINE 747-0443 or 735-881-3220  · Call your local Children's Mobile Crisis Response Team [place] (249) 582-0012 or www.[employer]utta  · Call the toll free National Suicide Prevention Hotlines   · National Suicide Prevention Lifeline 826-346-OIMB (2273)  · [facility] Line Network 800-LNRDALT (475-2261)        Endocarditis  Endocarditis is an infection of the inner layer of the heart (endocardium) or an infection of the heart valves. Endocarditis can cause growths inside the heart or on the heart valves. Over time, these growths can destroy heart tissue and cause heart failure or problems with heart rhythm. They can also cause stroke if they break away and form a blood clot in the brain. Early treatment offers the best chance for curing endocarditis and preventing complications.  What are the causes?  This condition may be caused by:  · Germs that normally live in or on your body. The germs that most commonly cause endocarditis are bacteria.  · A fungus.  What increases the risk?  This condition is more likely to develop in people who have:  · A heart defect.  · Artificial (prosthetic) heart valves.  · An abnormal or damaged heart valve.  · A history of endocarditis.  Having certain procedures may also increase the risk of germs getting into the heart or bloodstream.  What are the signs or  symptoms?  Symptoms of this condition may start suddenly, or they may start slowly and gradually get worse.  Symptoms include:  · Fever.  · Chills.  · Night sweats.  · Muscle aches.  · Fatigue.  · Weakness.  · Shortness of breath.  · Chest pain.  · Blood spots in the eyes.  · Bleeding under the fingernails or toenails.  · Painless red spots on the palms.  · Painful lumps in the fingertips or toes.  · Swelling in the feet or ankles.  How is this diagnosed?  This condition may be diagnosed based on:  · A physical exam. Your health care provider will listen to your heart to check for abnormal heart sounds (murmur). He or she may also use a scope to check for bleeding at the back of your eyes (retinas).  · Tests. They may include:  ¨ Blood tests to look for the germs that cause endocarditis.  ¨ Imaging tests. A chest x-ray, CT scan, or echocardiogram may be used to create an image of your heart. A type of echocardiogram called a transesophageal echocardiogram may be done to look at certain heart valves more closely.  How is this treated?  Treatment for this condition depends on the cause of the endocarditis. Treatment may include:  · Antibiotic medicines. These may be given through a tube into one of your veins (IV antibiotics) or taken by mouth. You may need to be on more than one antibiotic medicine.  · Surgery to replace your heart valve. You may need surgery if:  ¨ The endocarditis does not respond to treatment.  ¨ You develop complications.  ¨ Your heart valve is severely damaged.  Follow these instructions at home:  Medicines  · Take over-the-counter and prescription medicines only as told by your health care provider.  · If you were prescribed an antibiotic medicine, take it as told by your health care provider. Do not stop taking the antibiotic even if you start to feel better. You may need to be on intravenous antibiotics for several weeks.  · Do not use IV drugs unless it is part of your medical  treatment.  Lifestyle  · Do not get tattoos or body piercings.  · Practice good oral hygiene. This includes:  ¨ Brushing and flossing regularly.  ¨ Scheduling routine dental appointments.  · Do not use any products that contain nicotine or tobacco, such as cigarettes and e-cigarettes. If you need help quitting, ask your health care provider.  · Limit alcohol intake to no more than 1 drink a day for nonpregnant women and 2 drinks a day for men. One drink equals 12 oz of beer, 5 oz of wine, or 1½ oz of hard liquor.  General instructions  · Let your health care provider know before you have any dental or surgical procedures. You may need to take antibiotics before the procedure.  · Tell all of your health care providers, including your dentist, that you have had endocarditis.  · Gradually resume your usual activities.  · Keep all follow-up visits as told by your health care provider. This is important.  Contact a health care provider if:  · You have a fever.  · Your symptoms do not improve.  · Your symptoms get worse.  · Your symptoms come back.  Get help right away if:  · You have trouble breathing.  · You have chest pain.  · You have symptoms of a stroke. These include:  ¨ Sudden weakness.  ¨ Numbness.  ¨ Confusion.  ¨ Trouble talking or understanding.  ¨ A severe headache.  Summary  · Endocarditis is an infection of the inner layer of the heart (endocardium) or heart valves. It is caused by bacteria or a fungus.  · Having certain heart conditions or procedures may increase the risk of endocarditis.  · Antibiotics are an important treatment for endocarditis. Take these medicines as told by your health care provider. Do not stop taking them even if you start to feel better.  · Tell all of your health care providers, including your dentist, that you have had endocarditis.  This information is not intended to replace advice given to you by your health care provider. Make sure you discuss any questions you have with your  health care provider.  Document Released: 12/18/2006 Document Revised: 09/29/2017 Document Reviewed: 09/29/2017  [employer] Interactive Patient Education © 2017 [employer]    Sepsis, Adult  Sepsis is a serious infection of your blood or tissues that affects your whole body. The infection that causes sepsis may be bacterial, viral, fungal, or parasitic. Sepsis may be life threatening. Sepsis can cause your blood pressure to drop. This may result in shock. Shock causes your central nervous system and your organs to stop working correctly.  What increases the risk?  Sepsis can happen in anyone, but it is more likely to happen in people who have weakened immune systems.  What are the signs or symptoms?  Symptoms of sepsis can include:  · Fever or low body temperature (hypothermia).  · Rapid breathing (hyperventilation).  · Chills.  · Rapid heartbeat (tachycardia).  · Confusion or light-headedness.  · Trouble breathing.  · Urinating much less than usual.  · Cool, clammy skin or red, flushed skin.  · Other problems with the heart, kidneys, or brain.  How is this diagnosed?  Your health care provider will likely do tests to look for an infection, to see if the infection has spread to your blood, and to see how serious your condition is. Tests can include:  · Blood tests, including cultures of your blood.  · Cultures of other fluids from your body, such as:  ¨ Urine.  ¨ Pus from wounds.  ¨ Mucus coughed up from your lungs.  · Urine tests other than cultures.  · X-ray exams or other imaging tests.  How is this treated?  Treatment will begin with elimination of the source of infection. If your sepsis is likely caused by a bacterial or fungal infection, you will be given antibiotic or antifungal medicines.  You may also receive:  · Oxygen.  · Fluids through an IV tube.  · Medicines to increase your blood pressure.  · A machine to clean your blood (dialysis) if your kidneys fail.  · A machine to help you breathe if your lungs  fail.  Get help right away if:  You get an infection or develop any of the signs and symptoms of sepsis after surgery or a hospitalization.  This information is not intended to replace advice given to you by your health care provider. Make sure you discuss any questions you have with your health care provider.  Document Released: 09/15/2004 Document Revised: 05/25/2017 Document Reviewed: 08/25/2014  [employer] Interactive Patient Education © 2017 [employer]        Home Infusion:  [facility]  [address]  [address]  [place]    [phone]      Follow up with home infusion , cardiothoracic surgery , infectious diseases, cardiology and primary care provider as instructed. Return to ER if fevers,, pain, weakness , not feeling well.     Discharge Instructions    Discharged to home by car with relative. Discharged via walking, hospital escort: Yes.  Special equipment needed: Not Applicable    Be sure to schedule a follow-up appointment with your primary care doctor or any specialists as instructed.     Discharge Plan:   Diet Plan: Discussed  Activity Level: Discussed  Confirmed Follow up Appointment: Appointment Scheduled  Confirmed Symptoms Management: Discussed  Medication Reconciliation Updated: Yes  Influenza Vaccine Indication: Not indicated: Previously immunized this influenza season and > 8 years of age    I understand that a diet low in cholesterol, fat, and sodium is recommended for good health. Unless I have been given specific instructions below for another diet, I accept this instruction as my diet prescription.   Other diet:     Special Instructions: None    · Is patient discharged on Warfarin / Coumadin?   No     Depression / Suicide Risk    As you are discharged from this [facility] facility, it is important to learn how to keep safe from harming yourself.    Recognize the warning signs:  · Abrupt changes in personality, positive or negative- including increase in energy   · Giving away  possessions  · Change in eating patterns- significant weight changes-  positive or negative  · Change in sleeping patterns- unable to sleep or sleeping all the time   · Unwillingness or inability to communicate  · Depression  · Unusual sadness, discouragement and loneliness  · Talk of wanting to die  · Neglect of personal appearance   · Rebelliousness- reckless behavior  · Withdrawal from people/activities they love  · Confusion- inability to concentrate     If you or a loved one observes any of these behaviors or has concerns about self-harm, here's what you can do:  · Talk about it- your feelings and reasons for harming yourself  · Remove any means that you might use to hurt yourself (examples: pills, rope, extension cords, firearm)  · Get professional help from the community (Mental Health, Substance Abuse, psychological counseling)  · Do not be alone:Call your Safe Contact- someone whom you trust who will be there for you.  · Call your local CRISIS HOTLINE 702-0897 or 411-794-8950  · Call your local Children's Mobile Crisis Response Team [place] (759) 140-4201 or www.[employer]  · Call the toll free National Suicide Prevention Hotlines   · National Suicide Prevention Lifeline 329-734-BGIW (2578)  · National Hope Line Network 800-SUICIDE (656-0315)

## 2020-03-17 NOTE — PROGRESS NOTES
Infectious Disease Progress Note    Author: Katie Leija M.D. Date & Time of service: 3/17/2020  2:11 PM    Chief Complaint:  Follow-up for strep sepsis/infective endocarditis    Interval History:  55 y/o male admitted 3/10/2020 with above  3/12 T-max 101.9 WBC 11. 6 repeat blood cultures pending.  Blood cultures on 3/10 growing Streptococcus.  Patient feels better today.  He states his back pain is also improved.  MRI of the thoracic and lumbar spine without any evidence of infection.  3/13 T-max 100.1 WBC 11.9 continues to have low-grade fevers.  He did wake up with night sweats.  However he overall feels well with decreased back pain.  Remains constipated  3/14 Tmax 100.6 WBC 11.9 remains febrile.  Patient not feeling his fevers.  He is frustrated by his course of hospitalization.  Denies any new symptoms.  No improvement in leukocytosis.  Wife and patient with many questions that were addressed.  Plan of care discussed in detail  3/15 T-max 99.7 WBC 13.6 fever curve improved.  Patient sleeping and not arousable to voice  3/16 Low grade temp 100.4, WBC 12.3 BEATRIZ cancelled again-frustrated. Awaiting line placement  3/17 Tmax 100.3 WBC 12.2 BEATRIZ not going to be done so plan for discharge today. Up to bathroom    Labs Reviewed, Medications Reviewed     Review of Systems:  Review of Systems   Constitutional: Negative for fever.   Respiratory: Negative for cough and shortness of breath.    Cardiovascular: Negative for chest pain.   Gastrointestinal: Negative for abdominal pain, diarrhea, nausea and vomiting.   All other systems reviewed and are negative.      Hemodynamics:  Temp (24hrs), Av.4 °C (99.3 °F), Min:36.7 °C (98.1 °F), Max:37.9 °C (100.3 °F)  Temperature: 36.7 °C (98.1 °F)  Pulse  Av  Min: 60  Max: 121   Blood Pressure: (!) 98/53       Physical Exam:  Physical Exam  Vitals signs and nursing note reviewed.   Constitutional:       General: He is not in acute distress.     Appearance: He is not  ill-appearing, toxic-appearing or diaphoretic.   HENT:      Nose: No rhinorrhea.   Eyes:      General: No scleral icterus.     Extraocular Movements: Extraocular movements intact.      Pupils: Pupils are equal, round, and reactive to light.   Cardiovascular:      Rate and Rhythm: Tachycardia present.      Heart sounds: Murmur present.   Pulmonary:      Effort: Pulmonary effort is normal. No respiratory distress.      Breath sounds: No stridor. No wheezing or rhonchi.   Abdominal:      Palpations: Abdomen is soft.   Skin:     Coloration: Skin is not jaundiced.   Neurological:      General: No focal deficit present.      Mental Status: He is alert.         Meds:    Current Facility-Administered Medications:   •  LR  •  aspirin EC  •  methocarbamol  •  Notify provider if pain remains uncontrolled **AND** Use the numeric rating scale (NRS-11) on regular floors and Critical-Care Pain Observation Tool (CPOT) on ICUs/Trauma to assess pain **AND** Pulse Ox (Oximetry) **AND** Pharmacy Consult Request **AND** If patient difficult to arouse and/or has respiratory depression, stop any opiates that are currently infusing and call a Rapid Response. **AND** oxyCODONE immediate-release **AND** [DISCONTINUED] oxyCODONE immediate-release **AND** [DISCONTINUED] HYDROmorphone  •  lidocaine  •  allopurinol  •  senna-docusate **AND** polyethylene glycol/lytes **AND** magnesium hydroxide **AND** bisacodyl  •  LR  •  LR  •  acetaminophen  •  ondansetron  •  ondansetron  •  promethazine  •  promethazine  •  prochlorperazine  •  cefTRIAXone (ROCEPHIN) IV    Labs:  Recent Labs     03/15/20  0409 03/16/20  0447 03/17/20  0333   WBC 13.6* 12.3* 12.2*   RBC 3.94* 3.98* 3.63*   HEMOGLOBIN 12.5* 12.7* 11.6*   HEMATOCRIT 36.4* 37.4* 34.1*   MCV 92.4 94.0 93.9   MCH 31.7 31.9 32.0   RDW 40.1 41.1 39.9   PLATELETCT 344 337 337   MPV 9.4 9.3 9.4   NEUTSPOLYS 71.20 63.90 65.50   LYMPHOCYTES 16.90* 22.20 20.50*   MONOCYTES 8.80 10.40 10.80   EOSINOPHILS  1.90 2.00 1.90   BASOPHILS 0.60 0.70 0.70     Recent Labs     03/15/20  0409 03/16/20  0447 03/17/20  0333   SODIUM 134* 134* 133*   POTASSIUM 4.5 5.1 4.5   CHLORIDE 101 101 101   CO2 22 25 24   GLUCOSE 116* 119* 114*   BUN 14 18 18     Recent Labs     03/15/20  0409 03/16/20  0447 03/17/20  0333   ALBUMIN  --   --  3.4   TBILIRUBIN  --   --  0.4   ALKPHOSPHAT  --   --  152*   TOTPROTEIN  --   --  7.4   ALTSGPT  --   --  37   ASTSGOT  --   --  28   CREATININE 0.94 0.98 0.97       Imaging:  Dx-chest-2 Views    Result Date: 3/9/2020    3/9/2020 10:57 PM HISTORY/REASON FOR EXAM: COUGH; Cough TECHNIQUE/EXAM DESCRIPTION:  PA and lateral views of the chest. COMPARISON: None FINDINGS: The cardiac silhouette appears within normal limits. The mediastinal contour appears within normal limits.  The central pulmonary vasculature appears normal. The lungs appear well expanded bilaterally.  Bilateral lungs are clear. No significant pleural effusions are identified. The bony structures appear age-appropriate.     1.  No acute cardiopulmonary disease.    Dx-chest-portable (1 View)    Result Date: 3/10/2020  3/10/2020 6:45 PM HISTORY/REASON FOR EXAM:  Sepsis and shortness of breath TECHNIQUE/EXAM DESCRIPTION AND NUMBER OF VIEWS: Single portable view of the chest. COMPARISON: 03/09/2020 FINDINGS: Heart size is within normal limits. No focal infiltrates or consolidations are identified in the lungs. No pleural fluid collections are identified. No pneumothorax is appreciated.     No acute cardiac or pulmonary abnormality is identified.    Mr-lumbar Spine-with & W/o    Result Date: 3/11/2020  3/11/2020 7:41 PM HISTORY/REASON FOR EXAM:  septic emboli. Low back pain. Fevers. Diffuse joint pain. TECHNIQUE/EXAM DESCRIPTION: MRI of the lumbar spine without and with contrast. The study was performed on a [employer] 1.5 Martha MRI scanner. T1 sagittal, T2 fast spin-echo sagittal, and T2 axial images were obtained of the lumbar spine. T1  post-contrast fat-suppressed sagittal images were obtained. Optional T2 fat-suppressed sagittal and T1 post-contrast axial images may be obtained. 20 mL ProHance gadolinium contrast was administered intravenously. COMPARISON:  MRI of thoracic spine from today's date FINDINGS: Alignment in the lumbar spine shows grade 1-2 spondylolisthesis of L5 on S1. There is bilateral L5 spondylolysis. There is mild degenerative retrolisthesis of L4 on L5. Marrow signal the vertebral bodies shows chronic fatty signal discogenic endplate marrow changes associated with Schmorl's node endplate irregularities at L4-5 and L5-S1. There is no pathologic osseous enhancement. There is no evidence of discitis or osteomyelitis. No evidence of epidural phlegmon or epidural abscess. There is mild degenerative disc space narrowing at L4-5 primarily at the posterior disc space. There is advanced degenerative disc space narrowing at L5-S1. The prevertebral and paraspinous soft tissues are unremarkable. The conus is normal in position and signal with its tip at the L1 level.  There is no abnormal cord enhancement. There is no abnormal intradural extra medullary enhancement. At T12-L1, no abnormality. At L1-2, no abnormality. At L2-3, no significant disc bulge or protrusion. No central or foraminal stenosis. Mild facet prominence. At L3-4, no significant disc bulge or protrusion. No central or foraminal stenosis. At L4-5, minimal disc bulging. Moderate hypertrophic facet arthropathy. No central stenosis. Moderate bilateral foraminal stenosis. At L5-S1, mild pseudo-disc bulging associated with spondylolisthesis. Severe bilateral foraminal stenosis with horizontal deformity of the neural foramina due to spondylolisthesis. Marked flattening of the exiting L5 nerve roots within the neural foramina.     1.  L5-S1 grade 1-2 spondylolisthesis with bilateral L5 spondylolysis. L4-5 slight degenerative retrolisthesis. 2.  L4-5 minimal disc bulging. Moderate  hypertrophic facet arthropathy. Moderate bilateral foraminal stenosis. 3.  L5-S1 mild pseudo-disc bulging associated with spondylolisthesis. Severe bilateral foraminal stenosis due to foraminal deformity associated with spondylolisthesis. 4.  No evidence of discitis, osteomyelitis, or epidural phlegmon/epidural abscess.    Mr-thoracic Spine-with & W/o    Result Date: 3/11/2020  3/11/2020 7:40 PM HISTORY/REASON FOR EXAM:  septic emboli. Worsening low back pain. Fevers. Diffuse joint pain. TECHNIQUE/EXAM DESCRIPTION: MRI of the thoracic spine without and with contrast. The study was performed on a [employer] 1.5 Martha MRI scanner. T1 sagittal, T2 fast spin-echo sagittal, and T2 axial images were obtained of the thoracic spine. T1 post-contrast fat suppressed sagittal images were obtained. Optional T1 post-contrast axial images may be obtained. 20 mL ProHance gadolinium contrast was administered intravenously. COMPARISON:  MRI lumbar spine from today's date FINDINGS: Alignment in the thoracic spine is normal. Marrow signal in the vertebral bodies is normal. There is no abnormal osseous enhancement. No evidence of discitis or osteomyelitis or epidural phlegmon/abscess. The thoracic spinal cord is normal in caliber and signal throughout its course. There is no abnormal cord enhancement. There is no abnormal intradural extra medullary enhancement. At T9-T10, there is a small central disc protrusion with a slight component of cephalad extrusion (T2 axial image 4, series 11, T2 sagittal image 9, series 10). Partial effacement of ventral subarachnoid space without cord deformity or javon central stenosis. Dorsal subarachnoid space remains generous. The neural foramina are widely patent at all thoracic levels.     1.  T9-T10 small central disc protrusion with slight component of cephalad extrusion. No javon central stenosis. 2.  No evidence of discitis, osteomyelitis, or epidural phlegmon/abscess.    Ec-echocardiogram  Complete W/o Cont    Result Date: 3/10/2020  Transthoracic Echo Report Echocardiography Laboratory CONCLUSIONS No prior study is available for comparison. Normal left ventricular chamber size. Moderate concentric left ventricular hypertrophy. Left ventricular ejection fraction is visually estimated to be 70%. Mobile vegetation seen on the anterior mitral valve leaflet. Mild mitral regurgitation. Severe aortic stenosis. Transvalvular gradients are - Peak: 104 mmHg, Mean: 59 mmHg. LV EF:  70    % FINDINGS Left Ventricle Normal left ventricular chamber size. Moderate concentric left ventricular hypertrophy. Normal left ventricular systolic function. Left ventricular ejection fraction is visually estimated to be 70%. Normal diastolic function. Right Ventricle The right ventricle was normal in size and function. Right Atrium The right atrium is normal in size.  Normal inferior vena cava size and inspiratory collapse. Left Atrium The left atrium is normal in size.  Left atrial volume index is 32 mL/sq m. Mitral Valve Mobile vegetation seen on the anterior mitral valve leaflet. Mild mitral regurgitation. No mitral stenosis. Aortic Valve Severe aortic stenosis. Vmax is 5.0 m/s. Transvalvular gradients are - Peak: 104 mmHg, Mean: 59 mmHg. Tricuspid Valve Structurally normal tricuspid valve without significant stenosis. Mild tricuspid regurgitation. Estimated right ventricular systolic pressure  is 38 mmHg. Right atrial pressure is estimated to be 3 mmHg. Pulmonic Valve Structurally normal pulmonic valve without significant stenosis or regurgitation. Pericardium Normal pericardium without effusion. Aorta The aortic root is normal. Sebastian Capps M.D. (Electronically Signed) Final Date:     10 March 2020                 22:25      Micro:  Results     Procedure Component Value Units Date/Time    BLOOD CULTURE [[id number]] Collected:  03/12/20 0424    Order Status:  Completed Specimen:  Blood from Peripheral Updated:  03/17/20  "0900     Significant Indicator NEG     Source BLD     Site PERIPHERAL     Culture Result No growth after 5 days of incubation.    Narrative:       Per Hospital Policy: Only change Specimen Src: to \"Line\" if  specified by physician order.  Left Hand    BLOOD CULTURE [[id number]] Collected:  03/12/20 0424    Order Status:  Completed Specimen:  Blood from Peripheral Updated:  03/17/20 0900     Significant Indicator NEG     Source BLD     Site PERIPHERAL     Culture Result No growth after 5 days of incubation.    Narrative:       Per Hospital Policy: Only change Specimen Src: to \"Line\" if  specified by physician order.  Left AC    URINE CULTURE(NEW) [[id number]] Collected:  03/10/20 1904    Order Status:  Completed Specimen:  Urine Updated:  03/13/20 0748     Significant Indicator NEG     Source UR     Site -     Culture Result No growth at 48 hours.    Narrative:       Indication for culture:->Septic Shock: Persistent  hypotension,  Lactic acid > 4, vasopressors/inotropes started  Indication for culture:->Septic Shock: Persistent    BLOOD CULTURE [[id number]]  (Abnormal) Collected:  03/10/20 1904    Order Status:  Completed Specimen:  Blood from Peripheral Updated:  03/12/20 1117     Significant Indicator POS     Source BLD     Site PERIPHERAL     Culture Result Growth detected by Bactec instrument.  03/11/2020  09:55      Viridans Streptococcus  See previous culture for sensitivity report.      Narrative:       Per Hospital Policy: Only change Specimen Src: to \"Line\" if  specified by physician order.  Right Forearm/Arm    Blood Culture [[id number]] Collected:  03/10/20 2059    Order Status:  Canceled Specimen:  Other from Peripheral     Blood Culture [[id number]] Collected:  03/10/20 2059    Order Status:  Canceled Specimen:  Other from Peripheral     Urinalysis [[id number]] Collected:  03/10/20 2059    Order Status:  Canceled Specimen:  Urine     URINALYSIS [[id number]] Collected:  03/10/20 1904    Order Status:  Completed " Specimen:  Urine Updated:  03/10/20 2025     Color Yellow     Character Clear     Specific Gravity 1.014     Ph 7.0     Glucose Negative mg/dL      Ketones Negative mg/dL      Protein Negative mg/dL      Bilirubin Negative     Urobilinogen, Urine 1.0     Nitrite Negative     Leukocyte Esterase Negative     Occult Blood Negative     Micro Urine Req see below     Comment: Microscopic examination not performed when specimen is clear  and chemically negative for protein, blood, leukocyte esterase  and nitrite.         Narrative:       Indication for culture:->Septic Shock: Persistent  hypotension,  Lactic acid > 4, vasopressors/inotropes started    BLOOD CULTURE [[id number]] Collected:  03/10/20 1832    Order Status:  Canceled Specimen:  Other from Peripheral           Assessment:  Active Hospital Problems    Diagnosis   • *Acute bacterial endocarditis [I33.0]   • Sepsis (HCC) [A41.9]   • Hypertension [I10]   • Gout [M10.9]   • Dental caries [K02.9]       Plan:  Viridans streptococcal sepsis  Mitral valve infective endocarditis  Oral source  TTE + veg on MV  Blood cultures on 3/10 - Viridans Streptococcus (penicillin intermediate)  Repeat blood cultures on 3/12-negative to date  Continueceftriaxone 2 g daily (KEMAL 0.25)  Anticipate a 4 week course of IV antibiotics from date of first negative blood culture  Plan for BEATRIZ -to be scheduled as outpatient now  Plan for home infusion    Leukocytosis, persistent  Secondary to above  On antibiotics above  Monitor    Back pain, improved  MRI of the thoracic and lumbar spine-negative for infection    Dental caries  Upper teeth extraction 8 to 9 months prior to admission  Patient known to have lower teeth dental caries, however refused extraction at that time  Recommend dental extraction while patient is on antibiotic therapy    I have performed a physical exam and reviewed and updated ROS and plan today 3/17/2020.  In review of yesterday's note 3/16/2020, there are no changes  except as documented above.      Disposition: Home IV antibiotics     Follow-up in ID clinic 2 weeks    Discussed with internal medicine/Dr Castro

## 2020-03-17 NOTE — DISCHARGE PLANNING
SW talked with [facility], verified that patient will have education and infusion today in home. RN, Etelvina to meet at patient's home.

## 2020-03-17 NOTE — ANESTHESIA PREPROCEDURE EVALUATION
Relevant Problems   CARDIAC   (+) Aortic stenosis   (+) Hypertension       Physical Exam    Airway   Mallampati: II  TM distance: >3 FB  Neck ROM: full       Cardiovascular - normal exam  Rhythm: regular  Rate: normal  (-) murmur     Dental - normal exam         Pulmonary - normal exam  Breath sounds clear to auscultation     Abdominal    Neurological - normal exam                 Anesthesia Plan    ASA 3       Plan - general       Airway plan will be natural airway        Induction: intravenous    Postoperative Plan: Postoperative administration of opioids is intended.    Pertinent diagnostic labs and testing reviewed    Informed Consent:    Anesthetic plan and risks discussed with patient.    Use of blood products discussed with: patient whom consented to blood products.       r/o endocarditis

## 2020-03-17 NOTE — PROGRESS NOTES
Received report from NOC RN. Assumed care of patient at 0700. Patient A&Ox 4, speaking in full sentences, follows commands and responds appropriately to questions. No signs of respiratory distress. Respirations are even and unlabored.  POC discussed and agreed upon with patient. Call light and belongings within reach. Bed in lowest locked position. Upper side rails raised. Fall risk precautions in place. Hourly rounding. Will continue to monitor.

## 2020-03-17 NOTE — OR NURSING
1220 Patient arrived to unit, lethargic at this time.  Report from anesthesia and RN.  Right radial TR band site clean, dry and soft.  1240 Patient awake and tolerating oral intake.  1300 2 ml air removed from TR band, no bleeding to site.  1308 Report given to Grace PEDRAZA.  1325 3 ml air removed from TR band, no bleeding to site.  1330 Patient transferred to [place] [id number] via bed with RN and transport, handoff report to Grace PEDRAZA.  TR band site clean, dry and soft.

## 2020-03-17 NOTE — PROGRESS NOTES
Patient discharged per orders. R radial site care reviewed with pt. Discharge paperwork reviewed and signed. Pt declined any questions or concerns. IV abx education set up for tonight at patients house. Pt left the unit via wheelchair with floor CNA. Pt had transportation arranged via car with spouse.

## 2020-03-17 NOTE — PROGRESS NOTES
[facility] Medicine Daily Progress Note    Date of Service  3/16/2020    Chief Complaint  54 y.o. male admitted 3/10/2020 with fevers.     Hospital Course    Patient with history of upper teeth extractions with dentures placed, lower tooth dental decay with future dental work planned admitted with fever over a month.  Patient was seen in ER on 3/9/2020 and thought to be viral illness.  Admitted following positive blood cultures and findings consistent with endocarditis.    Interval Problem Update    Anxious to go home. Midline placed. Following discussion with cardiology - patient receptive to staying for workup of his severe AS and MV endocarditis - will plan BEATRIZ and heart cath in the morning. Fevers improved. Repeat blood cultures negative.     Consultants/Specialty  Dr Ilan VALENTIN  Cardiology   CTS    Code Status  Full     Disposition    Plan continue antibiotics, to be determined.  Will need prolonged IV antibiotics.     Review of Systems  Review of Systems   Constitutional: Positive for chills and fever. Negative for diaphoresis and malaise/fatigue.        Improved    HENT: Negative for congestion.    Respiratory: Negative for cough, shortness of breath and stridor.    Cardiovascular: Negative for chest pain and palpitations.   Gastrointestinal: Negative for abdominal pain, diarrhea and vomiting.   Musculoskeletal: Positive for back pain. Negative for joint pain and neck pain.   Neurological: Negative for speech change, focal weakness and weakness.   Psychiatric/Behavioral: Negative for hallucinations and substance abuse. The patient is nervous/anxious.         Physical Exam  Temp:  [36.4 °C (97.6 °F)-38 °C (100.4 °F)] 37.1 °C (98.7 °F)  Pulse:  [] 118  Resp:  [17-18] 17  BP: ()/(68-95) 142/95  SpO2:  [93 %-98 %] 96 %    Physical Exam  Constitutional:       General: He is not in acute distress.     Appearance: He is obese. He is not diaphoretic.   Eyes:      Extraocular Movements: Extraocular movements  intact.      Conjunctiva/sclera: Conjunctivae normal.   Neck:      Musculoskeletal: Normal range of motion. No neck rigidity.   Cardiovascular:      Rate and Rhythm: Normal rate and regular rhythm.      Heart sounds: Murmur (3/6 high-pitched systolic ejection murmur) present.   Pulmonary:      Breath sounds: No stridor. No wheezing, rhonchi or rales.   Abdominal:      General: There is no distension.      Palpations: Abdomen is soft.      Tenderness: There is no abdominal tenderness.   Musculoskeletal:         General: No swelling or tenderness.   Skin:     General: Skin is warm and dry.   Neurological:      General: No focal deficit present.      Mental Status: He is alert and oriented to person, place, and time.      Sensory: No sensory deficit.      Comments: Gait nl .   Psychiatric:         Mood and Affect: Mood normal.         Behavior: Behavior normal.         Fluids  No intake or output data in the 24 hours ending 03/16/20 1739    Laboratory  Recent Labs     03/14/20  0316 03/15/20  0409 03/16/20  0447   WBC 11.9* 13.6* 12.3*   RBC 3.72* 3.94* 3.98*   HEMOGLOBIN 12.0* 12.5* 12.7*   HEMATOCRIT 35.5* 36.4* 37.4*   MCV 95.4 92.4 94.0   MCH 32.3 31.7 31.9   MCHC 33.8 34.3 34.0   RDW 41.4 40.1 41.1   PLATELETCT 313 344 337   MPV 9.4 9.4 9.3     Recent Labs     03/14/20  0316 03/15/20  0409 03/16/20  0447   SODIUM 133* 134* 134*   POTASSIUM 4.9 4.5 5.1   CHLORIDE 102 101 101   CO2 24 22 25   GLUCOSE 112* 116* 119*   BUN 12 14 18   CREATININE 0.86 0.94 0.98   CALCIUM 9.7 9.6 9.6                   Imaging  IR-MIDLINE CATHETER INSERTION WO GUIDANCE > AGE 3   Final Result                  Ultrasound-guided midline placement performed by qualified nursing staff    as above.          MR-LUMBAR SPINE-WITH & W/O   Final Result      1.  L5-S1 grade 1-2 spondylolisthesis with bilateral L5 spondylolysis. L4-5 slight degenerative retrolisthesis.   2.  L4-5 minimal disc bulging. Moderate hypertrophic facet arthropathy. Moderate  bilateral foraminal stenosis.   3.  L5-S1 mild pseudo-disc bulging associated with spondylolisthesis. Severe bilateral foraminal stenosis due to foraminal deformity associated with spondylolisthesis.   4.  No evidence of discitis, osteomyelitis, or epidural phlegmon/epidural abscess.      MR-THORACIC SPINE-WITH & W/O   Final Result      1.  T9-T10 small central disc protrusion with slight component of cephalad extrusion. No javon central stenosis.   2.  No evidence of discitis, osteomyelitis, or epidural phlegmon/abscess.      EC-ECHOCARDIOGRAM COMPLETE W/O CONT   Final Result      DX-CHEST-PORTABLE (1 VIEW)   Final Result         No acute cardiac or pulmonary abnormality is identified.      EC-BEATRIZ W/ CONT    (Results Pending)        Assessment/Plan  * Acute bacterial endocarditis- (present on admission)  Assessment & Plan  Blood cultures growing strep species. Anterior Mitral valve vegetation.  S. Virdans from dental infection.   Patient with back pain however MRI of the thoracic and lumbar spine without acute inflammatory changes or signs of infection.  Recommend removal of the remainder of his teeth as an outpatient.  Monitor for signs of acute heart failure.  Telemetry monitoring  Follow-up repeat blood cultures negative - midline placed.  Given persistent fevers- concern for complicated MV lesion, abscess- plan for BEATRIZ tomorrow.     Sepsis (HCC)- (present on admission)  Assessment & Plan  This is Sepsis Present on admission  SIRS criteria identified on my evaluation include: Fever, with temperature greater than 101 deg F, Tachycardia, with heart rate greater than 90 BPM and Leukocyosis, with WBC greater than 12,000  Source is endocarditis.  Sepsis protocol initiated  Fluid resuscitation ordered per protocol  IV antibiotics as appropriate for source of sepsis  While organ dysfunction may be noted elsewhere in this problem list or in the chart, degree of organ dysfunction does not meet CMS criteria for severe  sepsis  S Viridans bacteremia-  Suspect odontogenic source.  Continue IV Rocephin . Midline placed.  Will arrange for home infusion.   Prn tylenol for fevers  Additional workup of MV lesion with BEATRIZ tomorrow.  WBC increased- will monitor.   ID input.             Aortic stenosis  Assessment & Plan  Severe.   BP mildly low-- off ace inh  Add low dose Toprol Xl.  Plan cardiac cath.  CTS consult.       Back pain  Assessment & Plan  MRI thoracic and lumbar spine with variable spondylolisthesis, disc protrusion, foraminal stenosis.  No signs of acute infection.  Continue PRN oxycodone, IV Dilaudid  Neuro stable. - Multimodality pain control-cw Lidoderm patch, Duloxetine, Robaxin.   Wean narcotics as corine    Dental caries  Assessment & Plan  Likely source of bacteremia.  Advised patient he needs to have the remainder of his teeth removed.     Gout- (present on admission)  Assessment & Plan  Continue allopurinol.    Hypertension- (present on admission)  Assessment & Plan  Continue lisinopril.       VTE prophylaxis: SCDs    Case discussed with CTS and cardiology .

## 2020-03-26 ENCOUNTER — TELEPHONE (OUTPATIENT)
Dept: CARDIOLOGY | Facility: MEDICAL CENTER | Age: 55
End: 2020-03-26

## 2020-03-26 NOTE — TELEPHONE ENCOUNTER
LVM for patient about upcoming appointment with RS 3-. Asked patient if he has seen a cardiologist outside of the hospital. Asked patient to call me back.

## 2020-03-31 ENCOUNTER — OFFICE VISIT (OUTPATIENT)
Dept: INFECTIOUS DISEASES | Facility: MEDICAL CENTER | Age: 55
End: 2020-03-31
Payer: COMMERCIAL

## 2020-03-31 ENCOUNTER — OFFICE VISIT (OUTPATIENT)
Dept: CARDIOLOGY | Facility: MEDICAL CENTER | Age: 55
End: 2020-03-31
Payer: COMMERCIAL

## 2020-03-31 VITALS
SYSTOLIC BLOOD PRESSURE: 104 MMHG | TEMPERATURE: 98.4 F | BODY MASS INDEX: 27.49 KG/M2 | OXYGEN SATURATION: 95 % | HEART RATE: 93 BPM | WEIGHT: 192 LBS | HEIGHT: 70 IN | DIASTOLIC BLOOD PRESSURE: 44 MMHG

## 2020-03-31 VITALS
WEIGHT: 192 LBS | HEART RATE: 100 BPM | BODY MASS INDEX: 27.49 KG/M2 | OXYGEN SATURATION: 96 % | HEIGHT: 70 IN | DIASTOLIC BLOOD PRESSURE: 60 MMHG | SYSTOLIC BLOOD PRESSURE: 102 MMHG

## 2020-03-31 DIAGNOSIS — I35.0 AORTIC STENOSIS, SEVERE: ICD-10-CM

## 2020-03-31 DIAGNOSIS — K02.9 DENTAL CARIES: ICD-10-CM

## 2020-03-31 DIAGNOSIS — M54.50 ACUTE MIDLINE LOW BACK PAIN WITHOUT SCIATICA: ICD-10-CM

## 2020-03-31 DIAGNOSIS — D72.829 LEUKOCYTOSIS, UNSPECIFIED TYPE: ICD-10-CM

## 2020-03-31 DIAGNOSIS — B95.5 BACTEREMIA DUE TO STREPTOCOCCUS: ICD-10-CM

## 2020-03-31 DIAGNOSIS — I35.0 NONRHEUMATIC AORTIC VALVE STENOSIS: ICD-10-CM

## 2020-03-31 DIAGNOSIS — Z86.79 HISTORY OF SBE (SUBACUTE BACTERIAL ENDOCARDITIS): ICD-10-CM

## 2020-03-31 DIAGNOSIS — A49.1 STREPTOCOCCUS VIRIDANS INFECTION: ICD-10-CM

## 2020-03-31 DIAGNOSIS — I10 ESSENTIAL HYPERTENSION: ICD-10-CM

## 2020-03-31 DIAGNOSIS — I33.0 ACUTE BACTERIAL ENDOCARDITIS: ICD-10-CM

## 2020-03-31 DIAGNOSIS — R78.81 BACTEREMIA DUE TO STREPTOCOCCUS: ICD-10-CM

## 2020-03-31 PROBLEM — A41.9 SEPSIS (HCC): Status: RESOLVED | Noted: 2020-03-10 | Resolved: 2020-03-31

## 2020-03-31 PROCEDURE — 99214 OFFICE O/P EST MOD 30 MIN: CPT | Performed by: INTERNAL MEDICINE

## 2020-03-31 PROCEDURE — 99214 OFFICE O/P EST MOD 30 MIN: CPT | Performed by: NURSE PRACTITIONER

## 2020-03-31 ASSESSMENT — ENCOUNTER SYMPTOMS
SORE THROAT: 0
HEADACHES: 1
CONSTITUTIONAL NEGATIVE: 1
MUSCULOSKELETAL NEGATIVE: 1
SHORTNESS OF BREATH: 0
GASTROINTESTINAL NEGATIVE: 1
CHILLS: 0
HEARTBURN: 1
NAUSEA: 0
SENSORY CHANGE: 0
ABDOMINAL PAIN: 0
NEUROLOGICAL NEGATIVE: 1
BACK PAIN: 1
COUGH: 1
SPUTUM PRODUCTION: 0
CONSTIPATION: 0
WEAKNESS: 0
RESPIRATORY NEGATIVE: 1
CHILLS: 0
MYALGIAS: 1
SHORTNESS OF BREATH: 0
CARDIOVASCULAR NEGATIVE: 1
FEVER: 0
SPEECH CHANGE: 0
FOCAL WEAKNESS: 0
VOMITING: 0
PALPITATIONS: 1
FEVER: 0
DIARRHEA: 0
PALPITATIONS: 0
NERVOUS/ANXIOUS: 0
BRUISES/BLEEDS EASILY: 1

## 2020-03-31 ASSESSMENT — FIBROSIS 4 INDEX
FIB4 SCORE: 0.74
FIB4 SCORE: 0.74

## 2020-03-31 NOTE — PROGRESS NOTES
Infectious Disease Clinic    Subjective:     Chief Complaint   Patient presents with   • Hospital Follow-up     Mitral valve infective endocarditis     This is my first time meeting Mr. Malagon.     Interval History: 54 y.o. male with history of hyperlipidemia, gout, known murmur and hypertension.  Hospitalized from 3/10-3/17/2020, admitted for fevers.  Had been in his usual state of health until approximately 1 month PTA when he started to experience fevers.  He noted that the fevers progressively got worse.  Over the last week PTA he noted worsening lower back pain.  Approximately 8 to 9 months PTA, he underwent upper teeth dental extractions and had upper dentures placed.  His dentist at that time recommended that his lower teeth be extracted secondary to significant infection and dental caries; however, the patient refused.  On 3/9, he had presented to urgent care where he was noted to have a fever.  As such she was referred to the ED for further evaluation and management.  Patient was felt to have a viral syndrome, he was also noted to have a mildly elevated white count of 11.6 and a normal lactic acid.  Blood cultures were obtained at that time.  He was discharged home.  However, blood cultures drawn during the early hours on 3/10 returned positive Strep Viridans.  Patient was contacted and asked to return to the hospital for further evaluation and management.  Repeat blood culture on the evening of 3/10+ Strep Viridans.  On presentation, he was initially afebrile however he did spike a low-grade temperature of 100.1.  Patient had a leukocytosis of 11.6.  TTE on 3/10 showed mobile vegetation seen on the anterior mitral valve leaflet.  MRI on 3/11 of the lumbar and thoracic spine was negative for infection.  Repeat blood culture on 3/12 was negative.  It was recommended during his hospitalization that he had the lower dental caries removed; however, patient refused extraction at that time.  It was recommended  that dental extractions be completed while he was still on antibiotic therapy.  Discharged home on IV ceftriaxone x4 weeks, estimated end date 4/9/2020.  Also recommended that BEATRIZ be repeated after completion of antibiotics.     Hospital records reviewed    Today, 3/31/2020: Patient reports feeling well and has been tolerating the IV ceftriaxone with minimal adverse effect.  Notes that he has had occasional headaches on the left side, typically on a weekly basis.  Has had some heartburn that resolved with Pepcid.  Notes that he has a chronic dry cough that occurs at night, stating that he has had this for several months without any changes.  Notes that he has had occasional heart palpitations, but nothing that is overly concerning.  Otherwise, he denies feeling generally ill, fevers, chills, general malaise, nausea, vomiting, abdominal pain, chest pain or shortness of breath.  Was seen by cardiology earlier today, they are planning on doing a repeat BEATRIZ in approximately 1 month.  States that the cardiologist told him he will need open heart surgery for at least 1 valve, he will follow-up with the open heart surgeon within the next few weeks for evaluation.  Continues to have back pain, noting that previously it was in the lower back, it has now migrated to the mid back.  States it is mostly in the evenings, 2-10, a dull to sharp pain that is exasperated with movement, improved with rest, Tylenol and muscle relaxers.  States that he is scheduled to have the remaining teeth removed sometime next week, states that his dentist said that if he is still not on the IV antibiotics at that time then they will put him on some sort of oral antibiotic.    Review of Systems   Constitutional: Negative for chills, fever and malaise/fatigue.   HENT: Negative for congestion and sore throat.    Respiratory: Positive for cough (Chronic dry cough at night, has had for several months). Negative for sputum production and shortness of  "breath.    Cardiovascular: Positive for palpitations (Occasional). Negative for chest pain and leg swelling.   Gastrointestinal: Positive for heartburn. Negative for abdominal pain, constipation, diarrhea, nausea and vomiting.   Genitourinary: Negative for dysuria.   Musculoskeletal: Positive for back pain and myalgias. Negative for joint pain.   Skin: Negative for rash.   Neurological: Positive for headaches (Occasional). Negative for weakness.   Psychiatric/Behavioral: The patient is not nervous/anxious.        Past Medical History:   Diagnosis Date   • Hyperlipidemia    • Hypertension      Social History     Tobacco Use   • Smoking status: Never Smoker   • Smokeless tobacco: Never Used   Substance Use Topics   • Alcohol use: Never     Frequency: Never   • Drug use: Never       Allergies: Morphine    Pt's medication and problem list reviewed.     Objective:     /44 (BP Location: Left arm, Patient Position: Sitting, BP Cuff Size: Adult)   Pulse 93   Temp 36.9 °C (98.4 °F) (Temporal)   Ht 1.765 m (5' 9.5\")   Wt 87.1 kg (192 lb)   SpO2 95%   BMI 27.95 kg/m²     Physical Exam   Constitutional: He is oriented to person, place, and time and well-developed, well-nourished, and in no distress. No distress.   HENT:   Head: Normocephalic and atraumatic.   Wearing mask, unable to visualize mouth as patient did not want to remove mass.   Eyes: Pupils are equal, round, and reactive to light. Conjunctivae and EOM are normal. No scleral icterus.   Neck: Normal range of motion. Neck supple. No tracheal deviation present.   Cardiovascular: Normal rate and regular rhythm.   Murmur heard.  Pulmonary/Chest: Effort normal and breath sounds normal. No respiratory distress. He has no wheezes. He has no rales.   Abdominal: Soft. Bowel sounds are normal. He exhibits no distension. There is no abdominal tenderness. There is no rebound, no guarding and no CVA tenderness.   Musculoskeletal: Normal range of motion.         " General: No tenderness or edema.      Comments: RUE PICC- CDI, non tender, no erythema.    No spinal tenderness upon palpitation, no bony protrusions.   Lymphadenopathy:     He has no cervical adenopathy.   Neurological: He is alert and oriented to person, place, and time. No cranial nerve deficit. Gait normal.   Skin: Skin is warm and dry. No rash noted. He is not diaphoretic. No erythema.   Psychiatric: Mood, memory, affect and judgment normal.   Pleasant   Vitals reviewed.      Labs from [facility] on 3/24/2020:  WBC 8.2  Hemoglobin 12.5  Hematocrit 37.4  Platelet 469  Glucose 100  BUN 19  Creatinine 0.79  Sodium 138  Potassium 5.2  AST 31  ALT 37  Alk phos 143    Assessment and Plan:   The following treatment plan was discussed with patient at length:    1. Acute bacterial endocarditis      -Will extend IV ceftriaxone through 4/13 due to dental caries extractions planned for late next week.  Extension orders will be sent to N/V infusion.  Midline to be removed on 4/13 after last infusion dose.  -No need for additional antibiotics.  Monitor for s/sx of worsening off abx: fevers, chills, general malaise, etc.  Notify ID or go to ER should these s/sx occur.  -Follow-up with cardiology as directed.  BEATRIZ planned for approximately 1 month out.   2. Streptococcus viridans infection      As above   3. Bacteremia due to Streptococcus      As above   4. Dental caries      Planned for dental extractions next week.  It was recommended that dental extractions be completed while he was on IV antibiotics, which is why we will extend t   5. Leukocytosis, unspecified type      Resolved   6. Acute midline low back pain without sciatica      Improving     Follow up: PRN, RTC sooner if needed. FU with PCP for ongoing chronic medical conditions.     LINDA Taylor.       Please note that this dictation was created using voice recognition software. I have  worked with technical experts from [employer] to optimize the  interface.  I have made every reasonable attempt to correct obvious errors, but there may be errors of grammar and possibly content that I did not discover before finalizing the note.

## 2020-03-31 NOTE — PROGRESS NOTES
Chief Complaint   Patient presents with   • Chest Pain       Subjective:   Sanjeev Malagon is a 55 y.o. male who presents today for follow-up of severe aortic stenosis and history of streptococcal endocarditis of the mitral valve.  He was seen in consultation in early March in the hospital with endocarditis.  Blood cultures were positive for Streptococcus viridans.  Transthoracic echo revealed a large mobile mitral valve mass and severe aortic stenosis with peak and mean gradients of 104 and 59 respectively.  At the time of the echo, the patient's mitral sufficiency was mild.  He subsequently underwent a BEATRIZ on 17 March because of continued fevers and there was no evidence of ring abscess formation.  The mitral valve vegetation was not visualized on the BEATRIZ.  There are lesions noted attached to the noncoronary cusp of the aortic valve by BEATRIZ.    Since discharge from the hospital he is had no fevers, no exertional dyspnea, no edema and no neurologic events.  He is also had no petechiae or hematuria.  Past Medical History:   Diagnosis Date   • Hyperlipidemia    • Hypertension      History reviewed. No pertinent surgical history.  History reviewed. No pertinent family history.  Social History     Socioeconomic History   • Marital status:      Spouse name: Not on file   • Number of children: Not on file   • Years of education: Not on file   • Highest education level: Not on file   Occupational History   • Not on file   Social Needs   • Financial resource strain: Not on file   • Food insecurity     Worry: Not on file     Inability: Not on file   • Transportation needs     Medical: Not on file     Non-medical: Not on file   Tobacco Use   • Smoking status: Never Smoker   • Smokeless tobacco: Never Used   Substance and Sexual Activity   • Alcohol use: Never     Frequency: Never   • Drug use: Never   • Sexual activity: Not on file   Lifestyle   • Physical activity     Days per week: Not on file     Minutes per session:  Not on file   • Stress: Not on file   Relationships   • Social connections     Talks on phone: Not on file     Gets together: Not on file     Attends Gnosticism service: Not on file     Active member of club or organization: Not on file     Attends meetings of clubs or organizations: Not on file     Relationship status: Not on file   • Intimate partner violence     Fear of current or ex partner: Not on file     Emotionally abused: Not on file     Physically abused: Not on file     Forced sexual activity: Not on file   Other Topics Concern   • Not on file   Social History Narrative   • Not on file     Allergies   Allergen Reactions   • Morphine Hives     Outpatient Encounter Medications as of 3/31/2020   Medication Sig Dispense Refill   • acetaminophen (TYLENOL) 325 MG Tab Take 2 Tabs by mouth every 6 hours as needed for Mild Pain or Fever (Mild Pain; (Pain scale 1-3); Temp greater than 100.5 F). 90 Tab 0   • NS SOLN 100 mL with cefTRIAXone 2 GM SOLR 2 g 2 g by Intravenous route every 24 hours for 24 days.     • methocarbamol (ROBAXIN) 750 MG Tab Take 1 Tab by mouth 3 times a day as needed (back pain, spasms). 90 Tab 1   • allopurinol (ZYLOPRIM) 100 MG Tab Take 200 mg by mouth 2 Times a Day.     • multivitamin (THERAGRAN) Tab Take 1 Tab by mouth every day.     • Omega-3 Fatty Acids (FISH OIL) 1000 MG Cap capsule Take 1,000 mg by mouth every day.     • Cholecalciferol (VITAMIN D3 PO) Take 1 Tab by mouth every day.     • Red Yeast Rice Extract (RED YEAST RICE PO) Take 2 Caps by mouth every day.     • ibuprofen (MOTRIN) 200 MG Tab Take 800 mg by mouth 1 time daily as needed for Mild Pain or Fever.     • aspirin EC 81 MG EC tablet Take 1 Tab by mouth every day. (Patient not taking: Reported on 3/31/2020) 30 Tab 0   • lidocaine (LIDODERM) 5 % Patch Apply 1-2 Patches to skin as directed every 24 hours as needed (back pain.). To lower- mid back (Patient not taking: Reported on 3/31/2020) 10 Patch 2     No  "facility-administered encounter medications on file as of 3/31/2020.      Review of Systems   Constitutional: Negative.  Negative for chills and fever.   HENT: Negative.    Respiratory: Negative.  Negative for shortness of breath.    Cardiovascular: Negative.  Negative for palpitations and leg swelling.   Gastrointestinal: Negative.    Musculoskeletal: Negative.    Skin: Negative.    Neurological: Negative.  Negative for sensory change, speech change and focal weakness.   Endo/Heme/Allergies: Bruises/bleeds easily.        Objective:   /60 (BP Location: Left arm, Patient Position: Sitting, BP Cuff Size: Adult)   Pulse 100   Ht 1.765 m (5' 9.5\")   Wt 87.1 kg (192 lb)   SpO2 96%   BMI 27.95 kg/m²     Physical Exam   Constitutional: He is oriented to person, place, and time. He appears well-nourished. No distress.   HENT:   Head: Normocephalic and atraumatic.   Eyes: Pupils are equal, round, and reactive to light. No scleral icterus.   Neck: No JVD present. No tracheal deviation present.   Cardiovascular: Normal rate and regular rhythm.   Murmur heard.   Crescendo decrescendo systolic murmur is present with a grade of 4/6.  Murmur radiates to the apex   Pulmonary/Chest: Breath sounds normal. No respiratory distress. He has no wheezes.   Abdominal: Soft. Bowel sounds are normal. He exhibits no distension. There is no abdominal tenderness.   Musculoskeletal:         General: No edema.   Neurological: He is alert and oriented to person, place, and time.   Skin: Skin is warm and dry.   Psychiatric: He has a normal mood and affect.       Assessment:     1. Nonrheumatic aortic valve stenosis  EC-ECHOCARDIOGRAM COMPLETE W/O CONT   2. Essential hypertension  EC-ECHOCARDIOGRAM COMPLETE W/O CONT   3. History of SBE (subacute bacterial endocarditis)  EC-ECHOCARDIOGRAM COMPLETE W/O CONT   4. Aortic stenosis, severe         Medical Decision Making:  Today's Assessment / Status / Plan:   Strep viridans endocarditis: The " patient had a large mitral valve vegetation by transthoracic echo on March 10.  He is nearing the end of his course of antibiotic therapy and is doing well.  He said no fevers.  No clinical evidence of any embolization without any CVA, visual disturbance etc.  He is scheduled to see ID later this afternoon.    Aortic stenosis: Severe by examined by echo.  Presumptively, he has severe aortic stenosis before developed endocarditis.  His peak gradient is over 100 mmHg and he will require valve surgery.  The situation was discussed with the patient is and also with his wife via telephone.    Recommend that he have a repeat echo to reassess his aortic and mitral valve gradients and regurgitation and also reassess for any vegetations.    He was instructed to go the emergency room immediately should he have any neurologic events such as visual change, transient numbness or difficulty speaking or any fever in excess of 101.  He is scheduled to see the infectious disease team later today and has an appoint with Dr. Garcia for evaluation of his aortic and mitral valve disease in about 2 weeks.  In the interim, he will continue aspirin.  The patient will call if any change in clinical status.

## 2020-04-02 NOTE — PROGRESS NOTES
REFERRING PHYSICIAN: Miah Chaudhary MD.    CONSULTING PHYSICIAN: Katarina Markham MD, FACS.    CHIEF COMPLAINT: Shortness of breath.    HISTORY OF PRESENT ILLNESS: The patient is a 55 y.o. male with history of hypertension, hyperlipidemia, severe aortic stenosis, streptococcus viridans bacteremia and mitral valve endocarditis.  He presented to [facility] on early March complaining of fever and back pain.  He was worked up and found to have bacteremia with Streptococcus viridans due to dental caries.  A BEATRIZ revealed a large mitral valve vegetation with possible aortic valve vegetations.  He was treated with IV antibiotics for 6 weeks his last dose was on April 13, 2020.  He had his teeth pulled last Friday.  He states that he still has shortness of breath with activity since March.  It is not getting any worse.  He occasionally has heartburn that is better with medication.  He can walk about 100 yards before having to stop and rest.  He denies any chest pain, orthopnea, dizziness, lower extremity edema or syncope.      PAST MEDICAL HISTORY:   Past Medical History:   Diagnosis Date   • Hyperlipidemia    • Hypertension        PAST SURGICAL HISTORY:   No history of surgical procedure    FAMILY HISTORY:   Family History   Problem Relation Age of Onset   • Heart Disease Mother    • Stroke Mother    • Diabetes Mother    • No Known Problems Father    • Cancer Sister    • Diabetes Maternal Grandmother         SOCIAL HISTORY:   Social History     Socioeconomic History   • Marital status:      Spouse name: Not on file   • Number of children: Not on file   • Years of education: Not on file   • Highest education level: Not on file   Occupational History   • Not on file   Social Needs   • Financial resource strain: Not on file   • Food insecurity     Worry: Not on file     Inability: Not on file   • Transportation needs     Medical: Not on file     Non-medical: Not on file   Tobacco Use   • Smoking  status: Never Smoker   • Smokeless tobacco: Never Used   Substance and Sexual Activity   • Alcohol use: Not Currently   • Drug use: Yes     Types: Cocaine     Comment: when young   • Sexual activity: Not on file   Lifestyle   • Physical activity     Days per week: Not on file     Minutes per session: Not on file   • Stress: Not on file   Relationships   • Social connections     Talks on phone: Not on file     Gets together: Not on file     Attends Holiness service: Not on file     Active member of club or organization: Not on file     Attends meetings of clubs or organizations: Not on file     Relationship status: Not on file   • Intimate partner violence     Fear of current or ex partner: Not on file     Emotionally abused: Not on file     Physically abused: Not on file     Forced sexual activity: Not on file   Other Topics Concern   • Not on file   Social History Narrative   • Not on file       ALLERGIES:   Allergies   Allergen Reactions   • Morphine Hives        CURRENT MEDICATIONS:     Current Outpatient Medications:   •  atorvastatin (LIPITOR) 10 MG Tab, Take 0.5 Tabs by mouth every day., Disp: 45 Tab, Rfl: 1  •  aspirin EC 81 MG EC tablet, Take 1 Tab by mouth every day. (Patient not taking: Reported on 3/31/2020), Disp: 30 Tab, Rfl: 0  •  acetaminophen (TYLENOL) 325 MG Tab, Take 2 Tabs by mouth every 6 hours as needed for Mild Pain or Fever (Mild Pain; (Pain scale 1-3); Temp greater than 100.5 F)., Disp: 90 Tab, Rfl: 0  •  allopurinol (ZYLOPRIM) 100 MG Tab, Take 200 mg by mouth 2 Times a Day., Disp: , Rfl:   •  multivitamin (THERAGRAN) Tab, Take 1 Tab by mouth every day., Disp: , Rfl:   •  Omega-3 Fatty Acids (FISH OIL) 1000 MG Cap capsule, Take 1,000 mg by mouth every day., Disp: , Rfl:   •  Cholecalciferol (VITAMIN D3 PO), Take 1 Tab by mouth every day., Disp: , Rfl:   •  Red Yeast Rice Extract (RED YEAST RICE PO), Take 2 Caps by mouth every day., Disp: , Rfl:   •  ibuprofen (MOTRIN) 200 MG Tab, Take 800 mg  by mouth 1 time daily as needed for Mild Pain or Fever., Disp: , Rfl:      LABS REVIEWED:  Lab Results   Component Value Date/Time    SODIUM 138 04/08/2020 11:15 AM    POTASSIUM 4.9 04/08/2020 11:15 AM    CHLORIDE 105 04/08/2020 11:15 AM    CO2 20 04/08/2020 11:15 AM    GLUCOSE 74 04/08/2020 11:15 AM    BUN 13 04/08/2020 11:15 AM    CREATININE 0.79 04/08/2020 11:15 AM      Lab Results   Component Value Date/Time    PROTHROMBTM 14.7 (H) 03/10/2020 07:04 PM    INR 1.13 03/10/2020 07:04 PM      Lab Results   Component Value Date/Time    WBC 6.3 04/08/2020 11:15 AM    RBC 4.22 (L) 04/08/2020 11:15 AM    HEMOGLOBIN 13.4 (L) 04/08/2020 11:15 AM    HEMATOCRIT 40.3 (L) 04/08/2020 11:15 AM    MCV 95.5 04/08/2020 11:15 AM    MCH 31.8 04/08/2020 11:15 AM    MCHC 33.3 (L) 04/08/2020 11:15 AM    MPV 11.2 04/08/2020 11:15 AM    NEUTSPOLYS 50.90 04/08/2020 11:15 AM    LYMPHOCYTES 28.90 04/08/2020 11:15 AM    MONOCYTES 12.00 04/08/2020 11:15 AM    EOSINOPHILS 6.60 04/08/2020 11:15 AM    BASOPHILS 1.30 04/08/2020 11:15 AM        IMAGING REVIEWED AND INTERPRETED:    BEATRIZ ECHOCARDIOGRAM: 3/17/2020   The aortic valve was heavily thickened with a 5mm x 1cm lesion attached   to the non-coronary cusp.  There are multiple smaller mobile components and associated thickening   of the right and left coronary cusps.  There is likely leaflet perforation of the non-coronary cusp with mild   to moderate aortic insufficiency.  The valve appears trileaflet, however there could have been fusion of   two of the cusps which has now dehisced.  Mild mitral valve leaflet thickening but no evidence of javon   endocarditis.  There is thickening of the aorto-mitral valve curtain without evidence   of javon abscess formation.  Known severe aortic stenosis from previous TTE.   Normal left ventricular systolic function.  Left ventricular ejection fraction is visually estimated to be 65%.  Normal right ventricular size and systolic function.  No thrombus  "detected in the left atrial appendage.     Compared to the previous echocardiogram performed on 3/10/2020: the   mitral valve vegetation is no longer visible.     ECHOCARDIOGRAM 3/10/2020:  No prior study is available for comparison.   Normal left ventricular chamber size.  Moderate concentric left ventricular hypertrophy.  Left ventricular ejection fraction is visually estimated to be 70%.  Mobile vegetation seen on the anterior mitral valve leaflet.  Mild mitral regurgitation.  Severe aortic stenosis.  Transvalvular gradients are - Peak: 104 mmHg, Mean: 59 mmHg.    ANGIOGRAM [facility] 3/17/2020:  1.  Nonobstructive CAD.  2.  Normal caliber ascending arch and descending thoracic aorta and normal caliber abdominal aortoiliac system.  3.  Mild to moderate aortic insufficiency.    REVIEW OF SYSTEMS:   Review of Systems   Constitutional: Positive for malaise/fatigue.   HENT: Negative.    Eyes: Positive for blurred vision.        In eft eye, occasionally   Respiratory: Positive for shortness of breath.    Cardiovascular: Negative.    Gastrointestinal: Positive for heartburn.   Genitourinary: Negative.    Musculoskeletal: Negative.    Skin: Negative.    Neurological: Negative.    Endo/Heme/Allergies: Negative.    Psychiatric/Behavioral: Negative.        PHYSICAL EXAMINATION:    /54 (BP Location: Right arm, Patient Position: Sitting, BP Cuff Size: Adult)   Pulse 96   Temp 36.7 °C (98.1 °F) (Temporal)   Ht 1.753 m (5' 9\")   Wt 86.6 kg (191 lb)   SpO2 97%   BMI 28.21 kg/m² .    Physical Exam   Constitutional: He is oriented to person, place, and time. No distress.   HENT:   Head: Normocephalic.   Eyes: Pupils are equal, round, and reactive to light.   Neck: Neck supple.   Cardiovascular: Normal rate and regular rhythm.   Murmur heard.  Pulmonary/Chest: Effort normal.   Abdominal: Soft. He exhibits no distension. There is no abdominal tenderness.   Musculoskeletal:         General: No edema.   Neurological: He is alert " and oriented to person, place, and time.   Skin: Skin is warm and dry.   Psychiatric: Mood, memory, affect and judgment normal.   Vitals reviewed.      IMPRESSION:  Mitral valve endocarditis, possible aortic valve endocarditis, bacteremia (Streptococcus viridans), severe aortic stenosis.      PLAN:  I recommend that he undergo aortic valve replacement, possible mitral valve repair or replacement and intraoperative transesophageal echocardiography.    The procedure, its risks, benefits, potential complications and alternative treatments were discussed with the patient in detail including the risks should he decide not to undergo my recommended treatment. All of his questions were answered to his satisfaction and he is willing to proceed with the operation. The risks include death, stroke,  infection: to include a rare bacterial infection related to the use of the heart/lung machine, sudheer-operative myocardial infarction, dysrhythmias, diaphragmatic paralysis, chest wall paresthesia, tracheostomy, kidney or other organ failure, possible return to the operating room for bleeding, bleeding requiring transfusion with its attendant risks including AIDS or hepatitis, dehiscence of surgical incisions, respiratory complications including the need for prolonged ventilator support, Protamine or other drug reaction, peripheral neuropathy, loss of limb, and miscount of surgical items. The operative mortality risk is approximately 2-3%. The STS mortality risk score is 1.8% and the morbidity and mortality risk score is 10% for sommer valve replacement. The STS mortality risk score is 1% and the morbidity and mortality risk score is 8% for sommer valve replacement aortic valve replacement.The scores were discussed with patient.    The operation will be scheduled in late April or early May at [facility].  The patient will inform us tomorrow of his preference.  The differences between tissue and mechanical valves as  well as anticoagulation needs with each were explained to the patient in detail.  He is inclined to have a tissue valve implanted.    Findings and recommendations have been discussed with the patient’s cardiologist, Dr. Miah Chaudhary.  Thank you for this challenging consultation and participation in the patient’s care.  I will keep you apprised of all future developments.      Sincerely,      Katarina Markham MD, FACS.

## 2020-04-08 ENCOUNTER — HOSPITAL ENCOUNTER (OUTPATIENT)
Facility: MEDICAL CENTER | Age: 55
End: 2020-04-08
Attending: NURSE PRACTITIONER
Payer: COMMERCIAL

## 2020-04-08 ENCOUNTER — TELEPHONE (OUTPATIENT)
Dept: CARDIOLOGY | Facility: MEDICAL CENTER | Age: 55
End: 2020-04-08

## 2020-04-08 DIAGNOSIS — I25.10 CORONARY ARTERY DISEASE INVOLVING NATIVE HEART WITHOUT ANGINA PECTORIS, UNSPECIFIED VESSEL OR LESION TYPE: ICD-10-CM

## 2020-04-08 LAB
ALBUMIN SERPL BCP-MCNC: 4.3 G/DL (ref 3.2–4.9)
ALBUMIN/GLOB SERPL: 1.2 G/DL
ALP SERPL-CCNC: 93 U/L (ref 30–99)
ALT SERPL-CCNC: 24 U/L (ref 2–50)
ANION GAP SERPL CALC-SCNC: 13 MMOL/L (ref 7–16)
AST SERPL-CCNC: 25 U/L (ref 12–45)
BASOPHILS # BLD AUTO: 1.3 % (ref 0–1.8)
BASOPHILS # BLD: 0.08 K/UL (ref 0–0.12)
BILIRUB SERPL-MCNC: 0.3 MG/DL (ref 0.1–1.5)
BUN SERPL-MCNC: 13 MG/DL (ref 8–22)
CALCIUM SERPL-MCNC: 9.6 MG/DL (ref 8.5–10.5)
CHLORIDE SERPL-SCNC: 105 MMOL/L (ref 96–112)
CO2 SERPL-SCNC: 20 MMOL/L (ref 20–33)
CREAT SERPL-MCNC: 0.79 MG/DL (ref 0.5–1.4)
CRP SERPL HS-MCNC: 0.45 MG/DL (ref 0–0.75)
EOSINOPHIL # BLD AUTO: 0.42 K/UL (ref 0–0.51)
EOSINOPHIL NFR BLD: 6.6 % (ref 0–6.9)
ERYTHROCYTE [DISTWIDTH] IN BLOOD BY AUTOMATED COUNT: 43.4 FL (ref 35.9–50)
ERYTHROCYTE [SEDIMENTATION RATE] IN BLOOD BY WESTERGREN METHOD: 22 MM/HOUR (ref 0–20)
GLOBULIN SER CALC-MCNC: 3.5 G/DL (ref 1.9–3.5)
GLUCOSE SERPL-MCNC: 74 MG/DL (ref 65–99)
HCT VFR BLD AUTO: 40.3 % (ref 42–52)
HGB BLD-MCNC: 13.4 G/DL (ref 14–18)
IMM GRANULOCYTES # BLD AUTO: 0.02 K/UL (ref 0–0.11)
IMM GRANULOCYTES NFR BLD AUTO: 0.3 % (ref 0–0.9)
LYMPHOCYTES # BLD AUTO: 1.83 K/UL (ref 1–4.8)
LYMPHOCYTES NFR BLD: 28.9 % (ref 22–41)
MCH RBC QN AUTO: 31.8 PG (ref 27–33)
MCHC RBC AUTO-ENTMCNC: 33.3 G/DL (ref 33.7–35.3)
MCV RBC AUTO: 95.5 FL (ref 81.4–97.8)
MONOCYTES # BLD AUTO: 0.76 K/UL (ref 0–0.85)
MONOCYTES NFR BLD AUTO: 12 % (ref 0–13.4)
NEUTROPHILS # BLD AUTO: 3.22 K/UL (ref 1.82–7.42)
NEUTROPHILS NFR BLD: 50.9 % (ref 44–72)
NRBC # BLD AUTO: 0 K/UL
NRBC BLD-RTO: 0 /100 WBC
PLATELET # BLD AUTO: 242 K/UL (ref 164–446)
PMV BLD AUTO: 11.2 FL (ref 9–12.9)
POTASSIUM SERPL-SCNC: 4.9 MMOL/L (ref 3.6–5.5)
PROT SERPL-MCNC: 7.8 G/DL (ref 6–8.2)
RBC # BLD AUTO: 4.22 M/UL (ref 4.7–6.1)
SODIUM SERPL-SCNC: 138 MMOL/L (ref 135–145)
WBC # BLD AUTO: 6.3 K/UL (ref 4.8–10.8)

## 2020-04-08 PROCEDURE — 86140 C-REACTIVE PROTEIN: CPT

## 2020-04-08 PROCEDURE — 80053 COMPREHEN METABOLIC PANEL: CPT

## 2020-04-08 PROCEDURE — 85652 RBC SED RATE AUTOMATED: CPT

## 2020-04-08 PROCEDURE — 85025 COMPLETE CBC W/AUTO DIFF WBC: CPT

## 2020-04-08 RX ORDER — ATORVASTATIN CALCIUM 10 MG/1
5 TABLET, FILM COATED ORAL DAILY
Qty: 45 TAB | Refills: 1 | Status: SHIPPED | OUTPATIENT
Start: 2020-04-08 | End: 2020-09-28

## 2020-04-08 NOTE — TELEPHONE ENCOUNTER
----- Message from Miah Chaudhary M.D. sent at 4/8/2020 10:43 AM PDT -----  Regarding: statin  Patient had nonobstructive CAD by recent heart cath.  Should start on low dose statin with atorvastatin 5 mg a day. Lipid profile in 3 months    ============================================    Called pt, discussed Dr Chaudhary recommendations, pt agreed and verbalizes understanding, education regarding new meds provided to pt.     New Rx for Atorvastatin 5mg PO daily sent to [facility]    Lipid profile ordered, lab slip mailed to pt

## 2020-04-14 ENCOUNTER — OFFICE VISIT (OUTPATIENT)
Dept: CARDIOTHORACIC SURGERY | Facility: MEDICAL CENTER | Age: 55
End: 2020-04-14
Payer: COMMERCIAL

## 2020-04-14 VITALS
DIASTOLIC BLOOD PRESSURE: 54 MMHG | OXYGEN SATURATION: 97 % | WEIGHT: 191 LBS | HEIGHT: 69 IN | SYSTOLIC BLOOD PRESSURE: 112 MMHG | BODY MASS INDEX: 28.29 KG/M2 | HEART RATE: 96 BPM | TEMPERATURE: 98.1 F

## 2020-04-14 DIAGNOSIS — I35.0 AORTIC VALVE STENOSIS, ETIOLOGY OF CARDIAC VALVE DISEASE UNSPECIFIED: ICD-10-CM

## 2020-04-14 PROCEDURE — 99214 OFFICE O/P EST MOD 30 MIN: CPT | Performed by: THORACIC SURGERY (CARDIOTHORACIC VASCULAR SURGERY)

## 2020-04-14 SDOH — HEALTH STABILITY: MENTAL HEALTH: HOW OFTEN DO YOU HAVE A DRINK CONTAINING ALCOHOL?: NOT ASKED

## 2020-04-14 ASSESSMENT — ENCOUNTER SYMPTOMS
MUSCULOSKELETAL NEGATIVE: 1
BLURRED VISION: 1
NEUROLOGICAL NEGATIVE: 1
CARDIOVASCULAR NEGATIVE: 1
SHORTNESS OF BREATH: 1
PSYCHIATRIC NEGATIVE: 1
HEARTBURN: 1

## 2020-04-14 ASSESSMENT — FIBROSIS 4 INDEX: FIB4 SCORE: 1.14

## 2020-04-14 NOTE — PATIENT INSTRUCTIONS
You have been scheduled for open heart surgery. A surgery scheduler will be calling you with a date for your preoperative testing.      Your surgery is scheduled for _________________________________ at __________ at [facility]. (Times may change if emergency arises)       On ___________________________________ at _______________ check in at the [facility]; [address] Park in the [address] Parking Garage or  Parking (located at the [facility]) for [address], and proceed down the trevino to the door marked [address]16.  TESTING WILL BEGIN AT____________. Please arrive 15 minutes before testing to complete paperwork.      Your preoperative testing may include noninvasive testing such as carotid duplex, vein mapping, blood work, urinalysis and chest x-ray. Lab work may not be done more than 72 hours before surgery. *It is ok to eat and drink before lab work*      You will also meet with the cardiac nurse navigator for preoperative teaching.      DO NOT EAT OR DRINK ANYTHING AFTER MIDNIGHT BEFORE SURGERY.       PLEASE STOP THE FOLLOWING MEDICATIONS ON THE TIMES STATED BELOW:                 STOP 4 DAYS PRIOR TO SURGERY: ELIQUIS, XARELTO, PRADAXA, EFFIENT                 STOP 5 DAYS PRIOR TO SURGERY: COUMADIN, EXTRA STRENGTH ASPIRIN                 STOP 7 DAYS PRIOR TO SURGERY: PLAVIX, BRILANTA                 STOP IMMEDIATELY: FISH OIL, GLUCOSAMINE, VITAMIN E AND GINKO BILOBA       On the day of surgery check in at 5:00 am. Go to the [facility] at [address] and park in the [facility] Garage. Take the garage elevator to “ground” and enter through the front doors. The check-in desk is in the front lobby as you enter the building and is clearly marked. Tell the  you are there to check-in for heart surgery.       Should you have any additional questions call the office at [phone].

## 2020-04-15 ENCOUNTER — HOSPITAL ENCOUNTER (OUTPATIENT)
Facility: MEDICAL CENTER | Age: 55
End: 2020-04-15
Attending: THORACIC SURGERY (CARDIOTHORACIC VASCULAR SURGERY)
Payer: COMMERCIAL

## 2020-04-15 ENCOUNTER — TELEPHONE (OUTPATIENT)
Dept: CARDIOTHORACIC SURGERY | Facility: MEDICAL CENTER | Age: 55
End: 2020-04-15

## 2020-04-15 DIAGNOSIS — I35.0 AORTIC VALVE STENOSIS, ETIOLOGY OF CARDIAC VALVE DISEASE UNSPECIFIED: ICD-10-CM

## 2020-04-15 LAB
SCCMEC + MECA PNL NOSE NAA+PROBE: NEGATIVE
SCCMEC + MECA PNL NOSE NAA+PROBE: NEGATIVE

## 2020-04-15 PROCEDURE — 87641 MR-STAPH DNA AMP PROBE: CPT

## 2020-04-15 PROCEDURE — 87640 STAPH A DNA AMP PROBE: CPT

## 2020-04-15 NOTE — TELEPHONE ENCOUNTER
Received voicemail from Pat; patient's wife regarding FMLA paperwork.  I called him back and left a message to discuss his questions.

## 2020-04-16 ENCOUNTER — TELEPHONE (OUTPATIENT)
Dept: CARDIOTHORACIC SURGERY | Facility: MEDICAL CENTER | Age: 55
End: 2020-04-16

## 2020-04-16 NOTE — TELEPHONE ENCOUNTER
Patient called regarding FMLA paperwork.  He states he cannot afford to be off work for 3 months and he could work from home on the computer for light work as an alternative.  After consulting and info gathering with Trevor and Jennifer I told him that it would be better for him to have those 3 months in case he needs it and if he if feeling okay for computer work from home we can always write him a release for work sooner than the three months.  It would be much more diffiuclt to tack on extra time.    He is going to call HR for his work and see if they would accept a physician note as an override to the 3 months if he is cleared by us and give me a call back.  I did let him know we couldn't fill out the FMLA until his operation anyway and that we have time.    No further questions at this time.

## 2020-04-23 ENCOUNTER — TELEPHONE (OUTPATIENT)
Dept: CARDIOTHORACIC SURGERY | Facility: MEDICAL CENTER | Age: 55
End: 2020-04-23

## 2020-04-23 NOTE — TELEPHONE ENCOUNTER
Called patient and set up education for 5/1 at 10:00 now that he picked a surgery date of 5/7.    Patient was also assisted with resending link to activate my chart account.

## 2020-04-23 NOTE — NON-PROVIDER
Problem: Prehabilitation    Goal: optimal education for cardiac prehabilitation to include: Incentive spirometry, diet, exercise, 5-meter walk test, smoking, alcohol & illicit drug screening and cessation education if needed, social/family support needs, and general post-surgical physical limitations.     Intervention: Prehabilitation education given to patient and wife Era, all questions were answered.  My card and cardiac surgery FAQ sheet with IS instructions was provided for any additional questions before their pre-op appointment and after discharge.    Routine dental appointment prior to surgery not indicated for valve procedure, patient just had full dentures placed.     Discussed importance of incentive spirometry (IS) use, 10 times in the morning and at night AT MINIMUM but more often if possible to improve cardio-pulmonary function prior to surgery.  Prehabilitation IS baseline; unable to perform; cannot suck on anything post dental procedure for 4-5 days.    5 meter walk test was performed, the times were not done on this visit.       We discussed importance of preventing deconditioning and muscle wasting. Patient is moderately physically active; current exercise tolerance/level is high due to minimal SOB. Patient educated to start an exercise regimen of walking on a flat surface 3-4 times a day for a length as tolerated. Patient was educated that if chest pain or SOB occurs patient is to stop immediately and if symptoms do not resolve to call 911.    Left main disease -- NO  EF-- 70%     Patient does not have CAD; education on cholesterol, diet, and the heart are not indicated.  Patient's BMI is 28.2.  Education regarding portion sizing and diet are in indicated.  Patient was receptive to education and materials were provided.  Patient has lost weight over the last 4-05 months at the instruction of his doctor; he went from 230 lbs and is now 191.    Patient denies smoking history.  Smoking risks and  cessation education not indicated.     Patient denies alcohol abuse.  Alcohol risks and cessation education not indicated.    Patient denies illicit drug use.  Illicit drug use risks and cessation education not indicated.    Patient does have family or friend to stay for 1-week post discharge to assist with ADL's    We discussed the basics of physical limitation post op to include:              No driving for 4 weeks              No lifting, pushing or pulling > 10 lbs for 6 weeks  Sternal precautions to include moving within the tube and safe mobility in and out of bed and the chair.    An appointment for pre op education was not made for to go over specifics of surgery and in hospital patient recovery as his has not settled on a date yet.  I plan to call on Monday for a baseline IS and check to see if he's settled on a date; he is contemplating May 7th.

## 2020-05-01 ENCOUNTER — HOSPITAL ENCOUNTER (OUTPATIENT)
Facility: MEDICAL CENTER | Age: 55
DRG: 220 | End: 2020-05-01
Attending: THORACIC SURGERY (CARDIOTHORACIC VASCULAR SURGERY)
Payer: COMMERCIAL

## 2020-05-01 ENCOUNTER — NON-PROVIDER VISIT (OUTPATIENT)
Dept: CARDIOTHORACIC SURGERY | Facility: MEDICAL CENTER | Age: 55
End: 2020-05-01
Payer: COMMERCIAL

## 2020-05-01 LAB
COVID ORDER STATUS COVID19: NORMAL
COVID ORDER STATUS COVID19: NORMAL

## 2020-05-01 NOTE — PROGRESS NOTES
Problem: Pre Op  Goal: Optimal preparation for CABG/Heart Valve surgery    Intervention: Pre Op education to patient & wife Era. Provide patient with Patient Guideline for Cardiac Surgery (See Pt. Ed.)  Discussed anatomy and physiology of cardiac surgery with patient and family. Reviewed post-op expectations to include ventilator management, cardiac monitoring, tubes and drains, the use of incentive spirometry with return demonstration, early ambulation,including walking 4 times a day, up in the recliner 3 times a day for meals, cough and deep breathing with heart pillow, chest splinting with pillow, pain control, and expected length of stay. Also provided information on Cardiac Rehab and how to schedule an appointment. Patient and family state full understanding of all information given.    Intervention: Baseline assessment to include IS volume and lung sounds.  Pre-op IS: 3750  Prehabilitation IS: 3500    Intervention: Patient instructed to be NPO at midnight except cardiac medications and the Powerade drink upon waking the morning of surgery.  Drink to be provided in pre op appointment the day before surgery. (No ASA, coumadin or Plavix)    Intervention: Shower with chlorhexidine x 2  Instructed patient to wash entire body with chlorhexidine wipes prior to bedtime the night before surgery.  Wipes to be provided in pre op appointment the day before surgery.  Patient instructed to NOT shave prior to surgery.    Intervention:  5 Meter walk test times were 4 4 4.    Education time 1 hour 45 minutes.  Patient and wife had many questions and we took a small break as patient became overwhelmed and needed a little time before resuming education.

## 2020-05-04 DIAGNOSIS — Z01.812 PRE-OPERATIVE LABORATORY EXAMINATION: ICD-10-CM

## 2020-05-04 LAB
COVID ORDER STATUS COVID19: NORMAL
SARS-COV-2 RNA RESP QL NAA+PROBE: NOT DETECTED
SPECIMEN SOURCE: NORMAL

## 2020-05-04 PROCEDURE — C9803 HOPD COVID-19 SPEC COLLECT: HCPCS

## 2020-05-06 ENCOUNTER — ANESTHESIA EVENT (OUTPATIENT)
Dept: SURGERY | Facility: MEDICAL CENTER | Age: 55
DRG: 220 | End: 2020-05-06
Payer: COMMERCIAL

## 2020-05-06 ENCOUNTER — HOSPITAL ENCOUNTER (OUTPATIENT)
Dept: RADIOLOGY | Facility: MEDICAL CENTER | Age: 55
DRG: 220 | End: 2020-05-06
Attending: THORACIC SURGERY (CARDIOTHORACIC VASCULAR SURGERY)
Payer: COMMERCIAL

## 2020-05-06 ENCOUNTER — HOSPITAL ENCOUNTER (OUTPATIENT)
Dept: RADIOLOGY | Facility: MEDICAL CENTER | Age: 55
DRG: 220 | End: 2020-05-06
Attending: NURSE PRACTITIONER | Admitting: THORACIC SURGERY (CARDIOTHORACIC VASCULAR SURGERY)
Payer: COMMERCIAL

## 2020-05-06 ENCOUNTER — HOSPITAL ENCOUNTER (OUTPATIENT)
Dept: RADIOLOGY | Facility: MEDICAL CENTER | Age: 55
DRG: 220 | End: 2020-05-06
Attending: THORACIC SURGERY (CARDIOTHORACIC VASCULAR SURGERY) | Admitting: THORACIC SURGERY (CARDIOTHORACIC VASCULAR SURGERY)
Payer: COMMERCIAL

## 2020-05-06 DIAGNOSIS — I35.0 AORTIC VALVE STENOSIS, ETIOLOGY OF CARDIAC VALVE DISEASE UNSPECIFIED: ICD-10-CM

## 2020-05-06 LAB
ABO GROUP BLD: NORMAL
ALBUMIN SERPL BCP-MCNC: 4.3 G/DL (ref 3.2–4.9)
ALBUMIN/GLOB SERPL: 1.3 G/DL
ALP SERPL-CCNC: 85 U/L (ref 30–99)
ALT SERPL-CCNC: 30 U/L (ref 2–50)
ANION GAP SERPL CALC-SCNC: 9 MMOL/L (ref 7–16)
APPEARANCE UR: CLEAR
APTT PPP: 30.2 SEC (ref 24.7–36)
AST SERPL-CCNC: 26 U/L (ref 12–45)
BASOPHILS # BLD AUTO: 0.9 % (ref 0–1.8)
BASOPHILS # BLD: 0.08 K/UL (ref 0–0.12)
BILIRUB SERPL-MCNC: 0.4 MG/DL (ref 0.1–1.5)
BILIRUB UR QL STRIP.AUTO: NEGATIVE
BLD GP AB SCN SERPL QL: NORMAL
BUN SERPL-MCNC: 20 MG/DL (ref 8–22)
CALCIUM SERPL-MCNC: 9.8 MG/DL (ref 8.5–10.5)
CHLORIDE SERPL-SCNC: 102 MMOL/L (ref 96–112)
CO2 SERPL-SCNC: 24 MMOL/L (ref 20–33)
COLOR UR: YELLOW
CREAT SERPL-MCNC: 0.7 MG/DL (ref 0.5–1.4)
EKG IMPRESSION: NORMAL
EOSINOPHIL # BLD AUTO: 0.49 K/UL (ref 0–0.51)
EOSINOPHIL NFR BLD: 5.7 % (ref 0–6.9)
ERYTHROCYTE [DISTWIDTH] IN BLOOD BY AUTOMATED COUNT: 44.8 FL (ref 35.9–50)
EST. AVERAGE GLUCOSE BLD GHB EST-MCNC: 105 MG/DL
GLOBULIN SER CALC-MCNC: 3.3 G/DL (ref 1.9–3.5)
GLUCOSE SERPL-MCNC: 106 MG/DL (ref 65–99)
GLUCOSE UR STRIP.AUTO-MCNC: NEGATIVE MG/DL
HBA1C MFR BLD: 5.3 % (ref 0–5.6)
HCT VFR BLD AUTO: 40.2 % (ref 42–52)
HGB BLD-MCNC: 13.5 G/DL (ref 14–18)
IMM GRANULOCYTES # BLD AUTO: 0.02 K/UL (ref 0–0.11)
IMM GRANULOCYTES NFR BLD AUTO: 0.2 % (ref 0–0.9)
INR PPP: 0.95 (ref 0.87–1.13)
KETONES UR STRIP.AUTO-MCNC: NEGATIVE MG/DL
LEUKOCYTE ESTERASE UR QL STRIP.AUTO: NEGATIVE
LYMPHOCYTES # BLD AUTO: 2.32 K/UL (ref 1–4.8)
LYMPHOCYTES NFR BLD: 27.2 % (ref 22–41)
MCH RBC QN AUTO: 31.8 PG (ref 27–33)
MCHC RBC AUTO-ENTMCNC: 33.6 G/DL (ref 33.7–35.3)
MCV RBC AUTO: 94.8 FL (ref 81.4–97.8)
MICRO URNS: NORMAL
MONOCYTES # BLD AUTO: 0.85 K/UL (ref 0–0.85)
MONOCYTES NFR BLD AUTO: 10 % (ref 0–13.4)
NEUTROPHILS # BLD AUTO: 4.77 K/UL (ref 1.82–7.42)
NEUTROPHILS NFR BLD: 56 % (ref 44–72)
NITRITE UR QL STRIP.AUTO: NEGATIVE
NRBC # BLD AUTO: 0 K/UL
NRBC BLD-RTO: 0 /100 WBC
PH UR STRIP.AUTO: 7 [PH] (ref 5–8)
PLATELET # BLD AUTO: 255 K/UL (ref 164–446)
PMV BLD AUTO: 10.4 FL (ref 9–12.9)
POTASSIUM SERPL-SCNC: 5.4 MMOL/L (ref 3.6–5.5)
PROT SERPL-MCNC: 7.6 G/DL (ref 6–8.2)
PROT UR QL STRIP: NEGATIVE MG/DL
PROTHROMBIN TIME: 12.9 SEC (ref 12–14.6)
RBC # BLD AUTO: 4.24 M/UL (ref 4.7–6.1)
RBC UR QL AUTO: NEGATIVE
RH BLD: NORMAL
SARS-COV-2 RNA RESP QL NAA+PROBE: NOT DETECTED
SODIUM SERPL-SCNC: 135 MMOL/L (ref 135–145)
SP GR UR STRIP.AUTO: 1.02
SPECIMEN SOURCE: NORMAL
UROBILINOGEN UR STRIP.AUTO-MCNC: 0.2 MG/DL
WBC # BLD AUTO: 8.5 K/UL (ref 4.8–10.8)

## 2020-05-06 PROCEDURE — 93005 ELECTROCARDIOGRAM TRACING: CPT | Performed by: THORACIC SURGERY (CARDIOTHORACIC VASCULAR SURGERY)

## 2020-05-06 PROCEDURE — 86900 BLOOD TYPING SEROLOGIC ABO: CPT

## 2020-05-06 PROCEDURE — 85730 THROMBOPLASTIN TIME PARTIAL: CPT

## 2020-05-06 PROCEDURE — 80053 COMPREHEN METABOLIC PANEL: CPT

## 2020-05-06 PROCEDURE — 86901 BLOOD TYPING SEROLOGIC RH(D): CPT

## 2020-05-06 PROCEDURE — 93880 EXTRACRANIAL BILAT STUDY: CPT

## 2020-05-06 PROCEDURE — 83036 HEMOGLOBIN GLYCOSYLATED A1C: CPT

## 2020-05-06 PROCEDURE — 85610 PROTHROMBIN TIME: CPT

## 2020-05-06 PROCEDURE — 71046 X-RAY EXAM CHEST 2 VIEWS: CPT

## 2020-05-06 PROCEDURE — 86850 RBC ANTIBODY SCREEN: CPT

## 2020-05-06 PROCEDURE — 85025 COMPLETE CBC W/AUTO DIFF WBC: CPT

## 2020-05-06 PROCEDURE — 93010 ELECTROCARDIOGRAM REPORT: CPT | Performed by: INTERNAL MEDICINE

## 2020-05-06 PROCEDURE — 81003 URINALYSIS AUTO W/O SCOPE: CPT

## 2020-05-06 RX ORDER — EPINEPHRINE HCL IN 0.9 % NACL 4MG/250ML
0-.2 PLASTIC BAG, INJECTION (ML) INTRAVENOUS CONTINUOUS
Status: DISCONTINUED | OUTPATIENT
Start: 2020-05-07 | End: 2020-05-08

## 2020-05-06 RX ORDER — DEXMEDETOMIDINE HYDROCHLORIDE 4 UG/ML
0-1.5 INJECTION INTRAVENOUS CONTINUOUS
Status: DISCONTINUED | OUTPATIENT
Start: 2020-05-07 | End: 2020-05-07

## 2020-05-06 RX ORDER — NOREPINEPHRINE BITARTRATE 0.03 MG/ML
0-30 INJECTION, SOLUTION INTRAVENOUS CONTINUOUS
Status: DISCONTINUED | OUTPATIENT
Start: 2020-05-07 | End: 2020-05-08

## 2020-05-06 ASSESSMENT — FIBROSIS 4 INDEX: FIB4 SCORE: 1.08

## 2020-05-06 NOTE — OR NURSING
COVID-19 Pre-surgery screenin. Do you have an undiagnosed respiratory illness or symptoms such as coughing or sneezing? No but does have allergies  a. Onset of Sx N/A  b. Acute vs. chronic respiratory illness N/A     2. Do you have an unexplained fever greater than 100.4 degrees Fahrenheit or 38 degrees Celsius?          No     3. Have you had direct exposure to a patient who tested positive for Covid-19?              No     4. Have you traveled within the last 14 days to [place], [place], [place], [place], or [place]?         No

## 2020-05-07 ENCOUNTER — APPOINTMENT (OUTPATIENT)
Dept: CARDIOLOGY | Facility: MEDICAL CENTER | Age: 55
DRG: 220 | End: 2020-05-07
Attending: THORACIC SURGERY (CARDIOTHORACIC VASCULAR SURGERY)
Payer: COMMERCIAL

## 2020-05-07 ENCOUNTER — APPOINTMENT (OUTPATIENT)
Dept: RADIOLOGY | Facility: MEDICAL CENTER | Age: 55
DRG: 220 | End: 2020-05-07
Attending: THORACIC SURGERY (CARDIOTHORACIC VASCULAR SURGERY)
Payer: COMMERCIAL

## 2020-05-07 ENCOUNTER — TELEPHONE (OUTPATIENT)
Dept: CARDIOTHORACIC SURGERY | Facility: MEDICAL CENTER | Age: 55
End: 2020-05-07

## 2020-05-07 ENCOUNTER — ANESTHESIA (OUTPATIENT)
Dept: SURGERY | Facility: MEDICAL CENTER | Age: 55
DRG: 220 | End: 2020-05-07
Payer: COMMERCIAL

## 2020-05-07 ENCOUNTER — HOSPITAL ENCOUNTER (INPATIENT)
Facility: MEDICAL CENTER | Age: 55
LOS: 7 days | DRG: 220 | End: 2020-05-14
Attending: THORACIC SURGERY (CARDIOTHORACIC VASCULAR SURGERY) | Admitting: THORACIC SURGERY (CARDIOTHORACIC VASCULAR SURGERY)
Payer: COMMERCIAL

## 2020-05-07 DIAGNOSIS — I33.0 ACUTE BACTERIAL ENDOCARDITIS: ICD-10-CM

## 2020-05-07 DIAGNOSIS — Z01.812 PRE-OPERATIVE LABORATORY EXAMINATION: ICD-10-CM

## 2020-05-07 DIAGNOSIS — Z95.2 S/P AORTIC VALVE REPLACEMENT: ICD-10-CM

## 2020-05-07 DIAGNOSIS — I35.0 AORTIC STENOSIS, SEVERE: ICD-10-CM

## 2020-05-07 PROBLEM — E87.5 HYPERKALEMIA: Status: ACTIVE | Noted: 2020-05-07

## 2020-05-07 PROBLEM — D62 ACUTE BLOOD LOSS AS CAUSE OF POSTOPERATIVE ANEMIA: Status: ACTIVE | Noted: 2020-05-07

## 2020-05-07 PROBLEM — Z99.11 ENCOUNTER FOR WEANING FROM VENTILATOR (HCC): Status: ACTIVE | Noted: 2020-05-07

## 2020-05-07 LAB
ABO + RH BLD: NORMAL
ACT BLD: 147 SEC (ref 74–137)
ACT BLD: 687 SEC (ref 74–137)
ACTION RANGE TRIGGERED IACRT: NO
ACTION RANGE TRIGGERED IACRT: YES
APTT PPP: 32.1 SEC (ref 24.7–36)
BASE EXCESS BLDA CALC-SCNC: -3 MMOL/L (ref -4–3)
BASE EXCESS BLDA CALC-SCNC: -4 MMOL/L (ref -4–3)
BASE EXCESS BLDA CALC-SCNC: -5 MMOL/L (ref -4–3)
BASE EXCESS BLDA CALC-SCNC: -6 MMOL/L (ref -4–3)
BASE EXCESS BLDA CALC-SCNC: -6 MMOL/L (ref -4–3)
BASE EXCESS BLDV CALC-SCNC: -4 MMOL/L (ref -4–3)
BODY TEMPERATURE: ABNORMAL DEGREES
CA-I BLD ISE-SCNC: 1.12 MMOL/L (ref 1.1–1.3)
CA-I BLD ISE-SCNC: 1.12 MMOL/L (ref 1.1–1.3)
CA-I BLD ISE-SCNC: 1.14 MMOL/L (ref 1.1–1.3)
CA-I BLD ISE-SCNC: 1.15 MMOL/L (ref 1.1–1.3)
CA-I BLD ISE-SCNC: 1.19 MMOL/L (ref 1.1–1.3)
CO2 BLDA-SCNC: 20 MMOL/L (ref 20–33)
CO2 BLDA-SCNC: 21 MMOL/L (ref 20–33)
CO2 BLDA-SCNC: 22 MMOL/L (ref 20–33)
CO2 BLDA-SCNC: 22 MMOL/L (ref 20–33)
CO2 BLDA-SCNC: 24 MMOL/L (ref 20–33)
CO2 BLDV-SCNC: 24 MMOL/L (ref 20–33)
EKG IMPRESSION: NORMAL
GLUCOSE BLD-MCNC: 105 MG/DL (ref 65–99)
GLUCOSE BLD-MCNC: 111 MG/DL (ref 65–99)
GLUCOSE BLD-MCNC: 115 MG/DL (ref 65–99)
GLUCOSE BLD-MCNC: 119 MG/DL (ref 65–99)
GLUCOSE BLD-MCNC: 120 MG/DL (ref 65–99)
GLUCOSE BLD-MCNC: 122 MG/DL (ref 65–99)
GLUCOSE BLD-MCNC: 141 MG/DL (ref 65–99)
GLUCOSE BLD-MCNC: 155 MG/DL (ref 65–99)
GLUCOSE BLD-MCNC: 155 MG/DL (ref 65–99)
GLUCOSE BLD-MCNC: 157 MG/DL (ref 65–99)
GLUCOSE BLD-MCNC: 165 MG/DL (ref 65–99)
GLUCOSE BLD-MCNC: 168 MG/DL (ref 65–99)
GLUCOSE BLD-MCNC: 168 MG/DL (ref 65–99)
GLUCOSE BLD-MCNC: 94 MG/DL (ref 65–99)
HCO3 BLDA-SCNC: 19 MMOL/L (ref 17–25)
HCO3 BLDA-SCNC: 20.3 MMOL/L (ref 17–25)
HCO3 BLDA-SCNC: 20.8 MMOL/L (ref 17–25)
HCO3 BLDA-SCNC: 21.1 MMOL/L (ref 17–25)
HCO3 BLDA-SCNC: 22.5 MMOL/L (ref 17–25)
HCO3 BLDV-SCNC: 22.4 MMOL/L (ref 24–28)
HCT VFR BLD CALC: 26 % (ref 42–52)
HCT VFR BLD CALC: 26 % (ref 42–52)
HCT VFR BLD CALC: 28 % (ref 42–52)
HCT VFR BLD CALC: 35 % (ref 42–52)
HCT VFR BLD CALC: 35 % (ref 42–52)
HGB BLD-MCNC: 11.9 G/DL (ref 14–18)
HGB BLD-MCNC: 11.9 G/DL (ref 14–18)
HGB BLD-MCNC: 8.8 G/DL (ref 14–18)
HGB BLD-MCNC: 8.8 G/DL (ref 14–18)
HGB BLD-MCNC: 9.5 G/DL (ref 14–18)
HOROWITZ INDEX BLDA+IHG-RTO: 125 MM[HG]
HOROWITZ INDEX BLDA+IHG-RTO: 154 MM[HG]
HOROWITZ INDEX BLDA+IHG-RTO: 170 MM[HG]
HOROWITZ INDEX BLDA+IHG-RTO: 260 MM[HG]
HOROWITZ INDEX BLDA+IHG-RTO: 268 MM[HG]
HOROWITZ INDEX BLDV+IHG-RTO: 54 MM[HG]
INR PPP: 1.28 (ref 0.87–1.13)
INST. QUALIFIED PATIENT IIQPT: YES
MAGNESIUM SERPL-MCNC: 2.4 MG/DL (ref 1.5–2.5)
O2/TOTAL GAS SETTING VFR VENT: 100 %
O2/TOTAL GAS SETTING VFR VENT: 100 %
O2/TOTAL GAS SETTING VFR VENT: 50 %
O2/TOTAL GAS SETTING VFR VENT: 80 %
PATHOLOGY CONSULT NOTE: NORMAL
PCO2 BLDA: 34.8 MMHG (ref 26–37)
PCO2 BLDA: 35 MMHG (ref 26–37)
PCO2 BLDA: 42.5 MMHG (ref 26–37)
PCO2 BLDA: 43.7 MMHG (ref 26–37)
PCO2 BLDA: 45.2 MMHG (ref 26–37)
PCO2 BLDV: 46.7 MMHG (ref 41–51)
PCO2 TEMP ADJ BLDA: 32.7 MMHG (ref 26–37)
PCO2 TEMP ADJ BLDA: 33.7 MMHG (ref 26–37)
PCO2 TEMP ADJ BLDA: 40.9 MMHG (ref 26–37)
PH BLDA: 7.27 [PH] (ref 7.4–7.5)
PH BLDA: 7.3 [PH] (ref 7.4–7.5)
PH BLDA: 7.32 [PH] (ref 7.4–7.5)
PH BLDA: 7.35 [PH] (ref 7.4–7.5)
PH BLDA: 7.37 [PH] (ref 7.4–7.5)
PH BLDV: 7.29 [PH] (ref 7.31–7.45)
PH TEMP ADJ BLDA: 7.32 [PH] (ref 7.4–7.5)
PH TEMP ADJ BLDA: 7.37 [PH] (ref 7.4–7.5)
PH TEMP ADJ BLDA: 7.38 [PH] (ref 7.4–7.5)
PLATELET # BLD AUTO: 177 K/UL (ref 164–446)
PO2 BLDA: 123 MMHG (ref 64–87)
PO2 BLDA: 125 MMHG (ref 64–87)
PO2 BLDA: 130 MMHG (ref 64–87)
PO2 BLDA: 136 MMHG (ref 64–87)
PO2 BLDA: 214 MMHG (ref 64–87)
PO2 BLDV: 54 MMHG (ref 25–40)
PO2 TEMP ADJ BLDA: 117 MMHG (ref 64–87)
PO2 TEMP ADJ BLDA: 121 MMHG (ref 64–87)
PO2 TEMP ADJ BLDA: 130 MMHG (ref 64–87)
POTASSIUM BLD-SCNC: 4.7 MMOL/L (ref 3.6–5.5)
POTASSIUM BLD-SCNC: 5.9 MMOL/L (ref 3.6–5.5)
POTASSIUM BLD-SCNC: 5.9 MMOL/L (ref 3.6–5.5)
POTASSIUM BLD-SCNC: 6.3 MMOL/L (ref 3.6–5.5)
POTASSIUM BLD-SCNC: 7 MMOL/L (ref 3.6–5.5)
POTASSIUM SERPL-SCNC: 4.5 MMOL/L (ref 3.6–5.5)
POTASSIUM SERPL-SCNC: 5.4 MMOL/L (ref 3.6–5.5)
POTASSIUM SERPL-SCNC: 6 MMOL/L (ref 3.6–5.5)
PROTHROMBIN TIME: 16.4 SEC (ref 12–14.6)
SAO2 % BLDA: 100 % (ref 93–99)
SAO2 % BLDA: 98 % (ref 93–99)
SAO2 % BLDA: 98 % (ref 93–99)
SAO2 % BLDA: 99 % (ref 93–99)
SAO2 % BLDA: 99 % (ref 93–99)
SAO2 % BLDV: 84 %
SODIUM BLD-SCNC: 135 MMOL/L (ref 135–145)
SODIUM BLD-SCNC: 135 MMOL/L (ref 135–145)
SODIUM BLD-SCNC: 138 MMOL/L (ref 135–145)
SODIUM BLD-SCNC: 139 MMOL/L (ref 135–145)
SODIUM BLD-SCNC: 141 MMOL/L (ref 135–145)
SPECIMEN DRAWN FROM PATIENT: ABNORMAL

## 2020-05-07 PROCEDURE — 5A1221Z PERFORMANCE OF CARDIAC OUTPUT, CONTINUOUS: ICD-10-PCS | Performed by: THORACIC SURGERY (CARDIOTHORACIC VASCULAR SURGERY)

## 2020-05-07 PROCEDURE — 501506 HCHG SUTURE GUIDE, VALVE REPLACEMENT: Performed by: THORACIC SURGERY (CARDIOTHORACIC VASCULAR SURGERY)

## 2020-05-07 PROCEDURE — 700101 HCHG RX REV CODE 250

## 2020-05-07 PROCEDURE — 502240 HCHG MISC OR SUPPLY RC 0272: Performed by: THORACIC SURGERY (CARDIOTHORACIC VASCULAR SURGERY)

## 2020-05-07 PROCEDURE — 700111 HCHG RX REV CODE 636 W/ 250 OVERRIDE (IP): Performed by: NURSE PRACTITIONER

## 2020-05-07 PROCEDURE — 501745 HCHG WIRE, SURGICAL STEEL: Performed by: THORACIC SURGERY (CARDIOTHORACIC VASCULAR SURGERY)

## 2020-05-07 PROCEDURE — 700101 HCHG RX REV CODE 250: Performed by: THORACIC SURGERY (CARDIOTHORACIC VASCULAR SURGERY)

## 2020-05-07 PROCEDURE — 503050 HCHG COR-KNOT QUICK LOADS 6PACK: Performed by: THORACIC SURGERY (CARDIOTHORACIC VASCULAR SURGERY)

## 2020-05-07 PROCEDURE — C1894 INTRO/SHEATH, NON-LASER: HCPCS | Performed by: THORACIC SURGERY (CARDIOTHORACIC VASCULAR SURGERY)

## 2020-05-07 PROCEDURE — 94150 VITAL CAPACITY TEST: CPT

## 2020-05-07 PROCEDURE — 85014 HEMATOCRIT: CPT | Mod: 91

## 2020-05-07 PROCEDURE — 700101 HCHG RX REV CODE 250: Performed by: ANESTHESIOLOGY

## 2020-05-07 PROCEDURE — 500890 HCHG PACK, OPEN HEART: Performed by: THORACIC SURGERY (CARDIOTHORACIC VASCULAR SURGERY)

## 2020-05-07 PROCEDURE — 770022 HCHG ROOM/CARE - ICU (200)

## 2020-05-07 PROCEDURE — 71045 X-RAY EXAM CHEST 1 VIEW: CPT

## 2020-05-07 PROCEDURE — 503000 HCHG SUTURE, OHS: Performed by: THORACIC SURGERY (CARDIOTHORACIC VASCULAR SURGERY)

## 2020-05-07 PROCEDURE — 82330 ASSAY OF CALCIUM: CPT | Mod: 91

## 2020-05-07 PROCEDURE — 02RF08Z REPLACEMENT OF AORTIC VALVE WITH ZOOPLASTIC TISSUE, OPEN APPROACH: ICD-10-PCS | Performed by: THORACIC SURGERY (CARDIOTHORACIC VASCULAR SURGERY)

## 2020-05-07 PROCEDURE — 502000 HCHG MISC OR IMPLANTS RC 0278: Performed by: THORACIC SURGERY (CARDIOTHORACIC VASCULAR SURGERY)

## 2020-05-07 PROCEDURE — 93005 ELECTROCARDIOGRAM TRACING: CPT | Performed by: NURSE PRACTITIONER

## 2020-05-07 PROCEDURE — 84132 ASSAY OF SERUM POTASSIUM: CPT | Mod: 91

## 2020-05-07 PROCEDURE — C1729 CATH, DRAINAGE: HCPCS | Performed by: THORACIC SURGERY (CARDIOTHORACIC VASCULAR SURGERY)

## 2020-05-07 PROCEDURE — 33405 REPLACEMENT AORTIC VALVE OPN: CPT | Mod: AS | Performed by: NURSE PRACTITIONER

## 2020-05-07 PROCEDURE — 160031 HCHG SURGERY MINUTES - 1ST 30 MINS LEVEL 5: Performed by: THORACIC SURGERY (CARDIOTHORACIC VASCULAR SURGERY)

## 2020-05-07 PROCEDURE — 501519 HCHG SUTURE, E PACK: Performed by: THORACIC SURGERY (CARDIOTHORACIC VASCULAR SURGERY)

## 2020-05-07 PROCEDURE — B24BZZ4 ULTRASONOGRAPHY OF HEART WITH AORTA, TRANSESOPHAGEAL: ICD-10-PCS | Performed by: THORACIC SURGERY (CARDIOTHORACIC VASCULAR SURGERY)

## 2020-05-07 PROCEDURE — 500002 HCHG ADHESIVE, DERMABOND: Performed by: THORACIC SURGERY (CARDIOTHORACIC VASCULAR SURGERY)

## 2020-05-07 PROCEDURE — 160042 HCHG SURGERY MINUTES - EA ADDL 1 MIN LEVEL 5: Performed by: THORACIC SURGERY (CARDIOTHORACIC VASCULAR SURGERY)

## 2020-05-07 PROCEDURE — C9248 INJ, CLEVIDIPINE BUTYRATE: HCPCS | Performed by: ANESTHESIOLOGY

## 2020-05-07 PROCEDURE — 82803 BLOOD GASES ANY COMBINATION: CPT | Mod: 91

## 2020-05-07 PROCEDURE — A9270 NON-COVERED ITEM OR SERVICE: HCPCS | Performed by: THORACIC SURGERY (CARDIOTHORACIC VASCULAR SURGERY)

## 2020-05-07 PROCEDURE — 94002 VENT MGMT INPAT INIT DAY: CPT

## 2020-05-07 PROCEDURE — 85049 AUTOMATED PLATELET COUNT: CPT

## 2020-05-07 PROCEDURE — C1898 LEAD, PMKR, OTHER THAN TRANS: HCPCS | Performed by: THORACIC SURGERY (CARDIOTHORACIC VASCULAR SURGERY)

## 2020-05-07 PROCEDURE — 700105 HCHG RX REV CODE 258: Performed by: ANESTHESIOLOGY

## 2020-05-07 PROCEDURE — 85610 PROTHROMBIN TIME: CPT

## 2020-05-07 PROCEDURE — C1725 CATH, TRANSLUMIN NON-LASER: HCPCS | Performed by: THORACIC SURGERY (CARDIOTHORACIC VASCULAR SURGERY)

## 2020-05-07 PROCEDURE — 700102 HCHG RX REV CODE 250 W/ 637 OVERRIDE(OP): Performed by: NURSE PRACTITIONER

## 2020-05-07 PROCEDURE — C9248 INJ, CLEVIDIPINE BUTYRATE: HCPCS | Performed by: NURSE PRACTITIONER

## 2020-05-07 PROCEDURE — 33405 REPLACEMENT AORTIC VALVE OPN: CPT | Performed by: THORACIC SURGERY (CARDIOTHORACIC VASCULAR SURGERY)

## 2020-05-07 PROCEDURE — 700111 HCHG RX REV CODE 636 W/ 250 OVERRIDE (IP)

## 2020-05-07 PROCEDURE — 88311 DECALCIFY TISSUE: CPT

## 2020-05-07 PROCEDURE — 82962 GLUCOSE BLOOD TEST: CPT | Mod: 91

## 2020-05-07 PROCEDURE — 700105 HCHG RX REV CODE 258: Performed by: THORACIC SURGERY (CARDIOTHORACIC VASCULAR SURGERY)

## 2020-05-07 PROCEDURE — 700102 HCHG RX REV CODE 250 W/ 637 OVERRIDE(OP): Performed by: THORACIC SURGERY (CARDIOTHORACIC VASCULAR SURGERY)

## 2020-05-07 PROCEDURE — 700111 HCHG RX REV CODE 636 W/ 250 OVERRIDE (IP): Performed by: ANESTHESIOLOGY

## 2020-05-07 PROCEDURE — 700102 HCHG RX REV CODE 250 W/ 637 OVERRIDE(OP): Performed by: ANESTHESIOLOGY

## 2020-05-07 PROCEDURE — 84295 ASSAY OF SERUM SODIUM: CPT | Mod: 91

## 2020-05-07 PROCEDURE — 82947 ASSAY GLUCOSE BLOOD QUANT: CPT

## 2020-05-07 PROCEDURE — 700101 HCHG RX REV CODE 250: Performed by: NURSE PRACTITIONER

## 2020-05-07 PROCEDURE — 93312 ECHO TRANSESOPHAGEAL: CPT

## 2020-05-07 PROCEDURE — 503001 HCHG PERFUSION: Performed by: THORACIC SURGERY (CARDIOTHORACIC VASCULAR SURGERY)

## 2020-05-07 PROCEDURE — 500112 HCHG BONE WAX: Performed by: THORACIC SURGERY (CARDIOTHORACIC VASCULAR SURGERY)

## 2020-05-07 PROCEDURE — C1751 CATH, INF, PER/CENT/MIDLINE: HCPCS | Performed by: THORACIC SURGERY (CARDIOTHORACIC VASCULAR SURGERY)

## 2020-05-07 PROCEDURE — 85347 COAGULATION TIME ACTIVATED: CPT | Mod: 91

## 2020-05-07 PROCEDURE — 93010 ELECTROCARDIOGRAM REPORT: CPT | Performed by: INTERNAL MEDICINE

## 2020-05-07 PROCEDURE — 99291 CRITICAL CARE FIRST HOUR: CPT | Performed by: INTERNAL MEDICINE

## 2020-05-07 PROCEDURE — 88305 TISSUE EXAM BY PATHOLOGIST: CPT

## 2020-05-07 PROCEDURE — 160048 HCHG OR STATISTICAL LEVEL 1-5: Performed by: THORACIC SURGERY (CARDIOTHORACIC VASCULAR SURGERY)

## 2020-05-07 PROCEDURE — A9270 NON-COVERED ITEM OR SERVICE: HCPCS | Performed by: NURSE PRACTITIONER

## 2020-05-07 PROCEDURE — 700105 HCHG RX REV CODE 258: Performed by: NURSE PRACTITIONER

## 2020-05-07 PROCEDURE — 85730 THROMBOPLASTIN TIME PARTIAL: CPT

## 2020-05-07 PROCEDURE — 160009 HCHG ANES TIME/MIN: Performed by: THORACIC SURGERY (CARDIOTHORACIC VASCULAR SURGERY)

## 2020-05-07 PROCEDURE — 83735 ASSAY OF MAGNESIUM: CPT

## 2020-05-07 PROCEDURE — P9047 ALBUMIN (HUMAN), 25%, 50ML: HCPCS

## 2020-05-07 PROCEDURE — 500734 HCHG INSERT, STEALTH: Performed by: THORACIC SURGERY (CARDIOTHORACIC VASCULAR SURGERY)

## 2020-05-07 DEVICE — VALVE INSPIRIS AORTIC 25MM: Type: IMPLANTABLE DEVICE | Status: FUNCTIONAL

## 2020-05-07 RX ORDER — SODIUM CHLORIDE, SODIUM LACTATE, POTASSIUM CHLORIDE, CALCIUM CHLORIDE 600; 310; 30; 20 MG/100ML; MG/100ML; MG/100ML; MG/100ML
INJECTION, SOLUTION INTRAVENOUS
Status: DISCONTINUED | OUTPATIENT
Start: 2020-05-07 | End: 2020-05-07 | Stop reason: SURG

## 2020-05-07 RX ORDER — DEXMEDETOMIDINE HYDROCHLORIDE 4 UG/ML
0-1.5 INJECTION INTRAVENOUS CONTINUOUS
Status: DISCONTINUED | OUTPATIENT
Start: 2020-05-07 | End: 2020-05-08

## 2020-05-07 RX ORDER — SUCCINYLCHOLINE/SOD CL,ISO/PF 200MG/10ML
SYRINGE (ML) INTRAVENOUS PRN
Status: DISCONTINUED | OUTPATIENT
Start: 2020-05-07 | End: 2020-05-07 | Stop reason: SURG

## 2020-05-07 RX ORDER — AMOXICILLIN 250 MG
2 CAPSULE ORAL 2 TIMES DAILY
Status: DISCONTINUED | OUTPATIENT
Start: 2020-05-07 | End: 2020-05-14 | Stop reason: HOSPADM

## 2020-05-07 RX ORDER — MIDAZOLAM HYDROCHLORIDE 1 MG/ML
2 INJECTION INTRAMUSCULAR; INTRAVENOUS
Status: DISCONTINUED | OUTPATIENT
Start: 2020-05-07 | End: 2020-05-09

## 2020-05-07 RX ORDER — ACETAMINOPHEN 650 MG/1
650 SUPPOSITORY RECTAL EVERY 4 HOURS PRN
Status: DISCONTINUED | OUTPATIENT
Start: 2020-05-07 | End: 2020-05-14 | Stop reason: HOSPADM

## 2020-05-07 RX ORDER — ONDANSETRON 2 MG/ML
8 INJECTION INTRAMUSCULAR; INTRAVENOUS EVERY 6 HOURS PRN
Status: DISCONTINUED | OUTPATIENT
Start: 2020-05-07 | End: 2020-05-14 | Stop reason: HOSPADM

## 2020-05-07 RX ORDER — SODIUM CHLORIDE 9 MG/ML
INJECTION, SOLUTION INTRAVENOUS CONTINUOUS
Status: DISCONTINUED | OUTPATIENT
Start: 2020-05-07 | End: 2020-05-09

## 2020-05-07 RX ORDER — ALUMINA, MAGNESIA, AND SIMETHICONE 2400; 2400; 240 MG/30ML; MG/30ML; MG/30ML
30 SUSPENSION ORAL EVERY 4 HOURS PRN
Status: DISCONTINUED | OUTPATIENT
Start: 2020-05-07 | End: 2020-05-14 | Stop reason: HOSPADM

## 2020-05-07 RX ORDER — PROCHLORPERAZINE EDISYLATE 5 MG/ML
10 INJECTION INTRAMUSCULAR; INTRAVENOUS EVERY 6 HOURS PRN
Status: DISCONTINUED | OUTPATIENT
Start: 2020-05-07 | End: 2020-05-14 | Stop reason: HOSPADM

## 2020-05-07 RX ORDER — ACETAMINOPHEN 500 MG
1000 TABLET ORAL EVERY 6 HOURS
Status: DISPENSED | OUTPATIENT
Start: 2020-05-07 | End: 2020-05-12

## 2020-05-07 RX ORDER — GABAPENTIN 300 MG/1
300 CAPSULE ORAL 2 TIMES DAILY
Status: DISCONTINUED | OUTPATIENT
Start: 2020-05-07 | End: 2020-05-09

## 2020-05-07 RX ORDER — CEFAZOLIN SODIUM 2 G/100ML
2 INJECTION, SOLUTION INTRAVENOUS ONCE
Status: COMPLETED | OUTPATIENT
Start: 2020-05-07 | End: 2020-05-07

## 2020-05-07 RX ORDER — BISACODYL 10 MG
10 SUPPOSITORY, RECTAL RECTAL
Status: DISCONTINUED | OUTPATIENT
Start: 2020-05-07 | End: 2020-05-14 | Stop reason: HOSPADM

## 2020-05-07 RX ORDER — GABAPENTIN 100 MG/1
100 CAPSULE ORAL 2 TIMES DAILY
Status: DISCONTINUED | OUTPATIENT
Start: 2020-05-12 | End: 2020-05-09

## 2020-05-07 RX ORDER — OXYCODONE HYDROCHLORIDE 10 MG/1
10 TABLET ORAL
Status: DISCONTINUED | OUTPATIENT
Start: 2020-05-07 | End: 2020-05-14 | Stop reason: HOSPADM

## 2020-05-07 RX ORDER — ROCURONIUM BROMIDE 10 MG/ML
INJECTION, SOLUTION INTRAVENOUS PRN
Status: DISCONTINUED | OUTPATIENT
Start: 2020-05-07 | End: 2020-05-07 | Stop reason: SURG

## 2020-05-07 RX ORDER — OXYCODONE HYDROCHLORIDE 5 MG/1
5 TABLET ORAL
Status: DISCONTINUED | OUTPATIENT
Start: 2020-05-07 | End: 2020-05-14 | Stop reason: HOSPADM

## 2020-05-07 RX ORDER — SODIUM CHLORIDE, SODIUM GLUCONATE, SODIUM ACETATE, POTASSIUM CHLORIDE AND MAGNESIUM CHLORIDE 526; 502; 368; 37; 30 MG/100ML; MG/100ML; MG/100ML; MG/100ML; MG/100ML
INJECTION, SOLUTION INTRAVENOUS PRN
Status: DISCONTINUED | OUTPATIENT
Start: 2020-05-07 | End: 2020-05-09

## 2020-05-07 RX ORDER — ACETAMINOPHEN 325 MG/1
650 TABLET ORAL EVERY 4 HOURS PRN
Status: DISCONTINUED | OUTPATIENT
Start: 2020-05-07 | End: 2020-05-14 | Stop reason: HOSPADM

## 2020-05-07 RX ORDER — PROTAMINE SULFATE 10 MG/ML
INJECTION, SOLUTION INTRAVENOUS PRN
Status: DISCONTINUED | OUTPATIENT
Start: 2020-05-07 | End: 2020-05-07 | Stop reason: SURG

## 2020-05-07 RX ORDER — HEPARIN SODIUM,PORCINE 1000/ML
VIAL (ML) INJECTION PRN
Status: DISCONTINUED | OUTPATIENT
Start: 2020-05-07 | End: 2020-05-07 | Stop reason: SURG

## 2020-05-07 RX ORDER — POLYETHYLENE GLYCOL 3350 17 G/17G
1 POWDER, FOR SOLUTION ORAL DAILY
Status: DISCONTINUED | OUTPATIENT
Start: 2020-05-08 | End: 2020-05-14 | Stop reason: HOSPADM

## 2020-05-07 RX ORDER — NITROGLYCERIN 20 MG/100ML
0-100 INJECTION INTRAVENOUS CONTINUOUS
Status: DISCONTINUED | OUTPATIENT
Start: 2020-05-07 | End: 2020-05-08 | Stop reason: ALTCHOICE

## 2020-05-07 RX ORDER — MAGNESIUM SULFATE 1 G/100ML
1 INJECTION INTRAVENOUS DAILY
Status: DISPENSED | OUTPATIENT
Start: 2020-05-07 | End: 2020-05-10

## 2020-05-07 RX ORDER — DEXTROSE MONOHYDRATE 25 G/50ML
50 INJECTION, SOLUTION INTRAVENOUS PRN
Status: ACTIVE | OUTPATIENT
Start: 2020-05-07 | End: 2020-05-08

## 2020-05-07 RX ORDER — SODIUM CHLORIDE 9 MG/ML
INJECTION, SOLUTION INTRAVENOUS
Status: DISCONTINUED | OUTPATIENT
Start: 2020-05-07 | End: 2020-05-07 | Stop reason: SURG

## 2020-05-07 RX ORDER — TRAMADOL HYDROCHLORIDE 50 MG/1
50 TABLET ORAL EVERY 4 HOURS PRN
Status: DISCONTINUED | OUTPATIENT
Start: 2020-05-07 | End: 2020-05-07

## 2020-05-07 RX ORDER — METHADONE HYDROCHLORIDE 10 MG/ML
INJECTION, SOLUTION INTRAMUSCULAR; INTRAVENOUS; SUBCUTANEOUS PRN
Status: DISCONTINUED | OUTPATIENT
Start: 2020-05-07 | End: 2020-05-07 | Stop reason: SURG

## 2020-05-07 RX ORDER — DIPHENHYDRAMINE HCL 25 MG
25 TABLET ORAL
Status: DISCONTINUED | OUTPATIENT
Start: 2020-05-07 | End: 2020-05-14 | Stop reason: HOSPADM

## 2020-05-07 RX ORDER — ACETAMINOPHEN 500 MG
1000 TABLET ORAL ONCE
Status: COMPLETED | OUTPATIENT
Start: 2020-05-07 | End: 2020-05-07

## 2020-05-07 RX ORDER — PROMETHAZINE HYDROCHLORIDE 25 MG/1
25 SUPPOSITORY RECTAL EVERY 6 HOURS PRN
Status: DISCONTINUED | OUTPATIENT
Start: 2020-05-07 | End: 2020-05-14 | Stop reason: HOSPADM

## 2020-05-07 RX ORDER — CHLORHEXIDINE GLUCONATE ORAL RINSE 1.2 MG/ML
SOLUTION DENTAL
Status: ON HOLD | COMMUNITY
Start: 2020-04-15 | End: 2020-05-14

## 2020-05-07 RX ORDER — LISINOPRIL 10 MG/1
10 TABLET ORAL DAILY
COMMUNITY
Start: 2020-04-26 | End: 2022-01-13 | Stop reason: SDUPTHER

## 2020-05-07 RX ORDER — CEFAZOLIN SODIUM 2 G/100ML
2 INJECTION, SOLUTION INTRAVENOUS ONCE
Status: DISCONTINUED | OUTPATIENT
Start: 2020-05-07 | End: 2020-05-07 | Stop reason: HOSPADM

## 2020-05-07 RX ORDER — GABAPENTIN 300 MG/1
300 CAPSULE ORAL ONCE
Status: COMPLETED | OUTPATIENT
Start: 2020-05-07 | End: 2020-05-07

## 2020-05-07 RX ORDER — SODIUM CHLORIDE, SODIUM GLUCONATE, SODIUM ACETATE, POTASSIUM CHLORIDE AND MAGNESIUM CHLORIDE 526; 502; 368; 37; 30 MG/100ML; MG/100ML; MG/100ML; MG/100ML; MG/100ML
INJECTION, SOLUTION INTRAVENOUS
Status: DISCONTINUED | OUTPATIENT
Start: 2020-05-07 | End: 2020-05-07 | Stop reason: SURG

## 2020-05-07 RX ORDER — CEFAZOLIN SODIUM 1 G/3ML
INJECTION, POWDER, FOR SOLUTION INTRAMUSCULAR; INTRAVENOUS PRN
Status: DISCONTINUED | OUTPATIENT
Start: 2020-05-07 | End: 2020-05-07 | Stop reason: SURG

## 2020-05-07 RX ADMIN — OXYCODONE HYDROCHLORIDE 10 MG: 10 TABLET ORAL at 20:49

## 2020-05-07 RX ADMIN — FENTANYL CITRATE 50 MCG: 0.05 INJECTION, SOLUTION INTRAMUSCULAR; INTRAVENOUS at 15:22

## 2020-05-07 RX ADMIN — SODIUM CHLORIDE: 9 INJECTION, SOLUTION INTRAVENOUS at 07:56

## 2020-05-07 RX ADMIN — HEPARIN SODIUM 35000 UNITS: 1000 INJECTION, SOLUTION INTRAVENOUS; SUBCUTANEOUS at 08:18

## 2020-05-07 RX ADMIN — SODIUM CHLORIDE, SODIUM GLUCONATE, SODIUM ACETATE, POTASSIUM CHLORIDE AND MAGNESIUM CHLORIDE: 526; 502; 368; 37; 30 INJECTION, SOLUTION INTRAVENOUS at 07:56

## 2020-05-07 RX ADMIN — NOREPINEPHRINE BITARTRATE 8 MCG/MIN: 1 INJECTION INTRAVENOUS at 08:14

## 2020-05-07 RX ADMIN — DEXMEDETOMIDINE HYDROCHLORIDE 0.4 MCG/KG/HR: 4 INJECTION INTRAVENOUS at 07:57

## 2020-05-07 RX ADMIN — SODIUM CHLORIDE, SODIUM GLUCONATE, SODIUM ACETATE, POTASSIUM CHLORIDE AND MAGNESIUM CHLORIDE 1000 ML: 526; 502; 368; 37; 30 INJECTION, SOLUTION INTRAVENOUS at 12:42

## 2020-05-07 RX ADMIN — ONDANSETRON 4 MG: 2 INJECTION INTRAMUSCULAR; INTRAVENOUS at 18:27

## 2020-05-07 RX ADMIN — ACETAMINOPHEN 1000 MG: 500 TABLET ORAL at 23:00

## 2020-05-07 RX ADMIN — PROPOFOL 50 MG: 10 INJECTION, EMULSION INTRAVENOUS at 08:02

## 2020-05-07 RX ADMIN — FENTANYL CITRATE 50 MCG: 0.05 INJECTION, SOLUTION INTRAMUSCULAR; INTRAVENOUS at 23:06

## 2020-05-07 RX ADMIN — CLEVIPIDINE 2 MG/HR: 0.5 EMULSION INTRAVENOUS at 18:38

## 2020-05-07 RX ADMIN — SODIUM CHLORIDE: 9 INJECTION, SOLUTION INTRAVENOUS at 11:01

## 2020-05-07 RX ADMIN — SODIUM CHLORIDE 2 UNITS/HR: 9 INJECTION, SOLUTION INTRAVENOUS at 12:30

## 2020-05-07 RX ADMIN — PROPOFOL 150 MG: 10 INJECTION, EMULSION INTRAVENOUS at 07:42

## 2020-05-07 RX ADMIN — AMINOCAPROIC ACID 2 G/HR: 250 INJECTION, SOLUTION INTRAVENOUS at 08:42

## 2020-05-07 RX ADMIN — GABAPENTIN 300 MG: 300 CAPSULE ORAL at 06:23

## 2020-05-07 RX ADMIN — PROTAMINE SULFATE 350 MG: 10 INJECTION, SOLUTION INTRAVENOUS at 09:58

## 2020-05-07 RX ADMIN — INSULIN HUMAN 7.5 UNITS: 100 INJECTION, SOLUTION PARENTERAL at 09:44

## 2020-05-07 RX ADMIN — LIDOCAINE HYDROCHLORIDE 0.5 ML: 10 INJECTION, SOLUTION EPIDURAL; INFILTRATION; INTRACAUDAL at 06:23

## 2020-05-07 RX ADMIN — METHADONE HYDROCHLORIDE 10 MG: 10 INJECTION, SOLUTION INTRAMUSCULAR; INTRAVENOUS; SUBCUTANEOUS at 08:08

## 2020-05-07 RX ADMIN — Medication 100 MG: at 07:42

## 2020-05-07 RX ADMIN — DEXMEDETOMIDINE HYDROCHLORIDE 0.4 MCG/KG/HR: 4 INJECTION INTRAVENOUS at 11:02

## 2020-05-07 RX ADMIN — SODIUM CHLORIDE, POTASSIUM CHLORIDE, SODIUM LACTATE AND CALCIUM CHLORIDE: 600; 310; 30; 20 INJECTION, SOLUTION INTRAVENOUS at 07:30

## 2020-05-07 RX ADMIN — CEFAZOLIN SODIUM 2 G: 2 INJECTION, SOLUTION INTRAVENOUS at 15:04

## 2020-05-07 RX ADMIN — AMINOCAPROIC ACID 8.8 G: 250 INJECTION, SOLUTION INTRAVENOUS at 08:00

## 2020-05-07 RX ADMIN — ROCURONIUM BROMIDE 10 MG: 10 INJECTION, SOLUTION INTRAVENOUS at 07:40

## 2020-05-07 RX ADMIN — METHADONE HYDROCHLORIDE 10 MG: 10 INJECTION, SOLUTION INTRAMUSCULAR; INTRAVENOUS; SUBCUTANEOUS at 07:39

## 2020-05-07 RX ADMIN — ROCURONIUM BROMIDE 40 MG: 10 INJECTION, SOLUTION INTRAVENOUS at 08:00

## 2020-05-07 RX ADMIN — CLEVIPIDINE 2 MG/HR: 0.5 EMULSION INTRAVENOUS at 09:58

## 2020-05-07 RX ADMIN — GABAPENTIN 300 MG: 300 CAPSULE ORAL at 17:00

## 2020-05-07 RX ADMIN — MAGNESIUM SULFATE 1 G: 1 INJECTION INTRAVENOUS at 11:08

## 2020-05-07 RX ADMIN — ROCURONIUM BROMIDE 30 MG: 10 INJECTION, SOLUTION INTRAVENOUS at 08:28

## 2020-05-07 RX ADMIN — OXYCODONE HYDROCHLORIDE 10 MG: 10 TABLET ORAL at 17:30

## 2020-05-07 RX ADMIN — CEFAZOLIN 2 G: 330 INJECTION, POWDER, FOR SOLUTION INTRAMUSCULAR; INTRAVENOUS at 07:58

## 2020-05-07 RX ADMIN — SENNOSIDES AND DOCUSATE SODIUM 2 TABLET: 8.6; 5 TABLET ORAL at 17:00

## 2020-05-07 RX ADMIN — OXYCODONE HYDROCHLORIDE 5 MG: 5 TABLET ORAL at 15:00

## 2020-05-07 RX ADMIN — ACETAMINOPHEN 1000 MG: 500 TABLET ORAL at 17:00

## 2020-05-07 RX ADMIN — ACETAMINOPHEN 1000 MG: 500 TABLET ORAL at 06:23

## 2020-05-07 SDOH — HEALTH STABILITY: MENTAL HEALTH: HOW OFTEN DO YOU HAVE A DRINK CONTAINING ALCOHOL?: MONTHLY OR LESS

## 2020-05-07 SDOH — HEALTH STABILITY: MENTAL HEALTH: HOW MANY STANDARD DRINKS CONTAINING ALCOHOL DO YOU HAVE ON A TYPICAL DAY?: 1 OR 2

## 2020-05-07 ASSESSMENT — COPD QUESTIONNAIRES
HAVE YOU SMOKED AT LEAST 100 CIGARETTES IN YOUR ENTIRE LIFE: NO/DON'T KNOW
DO YOU EVER COUGH UP ANY MUCUS OR PHLEGM?: NO/ONLY WITH OCCASIONAL COLDS OR INFECTIONS
DURING THE PAST 4 WEEKS HOW MUCH DID YOU FEEL SHORT OF BREATH: NONE/LITTLE OF THE TIME
COPD SCREENING SCORE: 1

## 2020-05-07 ASSESSMENT — FIBROSIS 4 INDEX: FIB4 SCORE: 1.01

## 2020-05-07 ASSESSMENT — PULMONARY FUNCTION TESTS: FVC: 1.3

## 2020-05-07 NOTE — OR SURGEON
Immediate Post OP Note    PreOp Diagnosis: Endocarditis (strep.  viridans), severe aortic stenosis, mild mitral regurgitation.    PostOp Diagnosis: Same    Procedure(s):  REPLACEMENT, AORTIC VALVE (25 mm Inspiris Mederos pericardial valve)  ECHOCARDIOGRAM, TRANSESOPHAGEAL    Surgeon(s):  Katarina Markham M.D.    Assistants:  1.  STEPHANE Benjamin  2.  Byron Wilson D.O.    Anesthesiologist/Type of Anesthesia:  Anesthesiologist: Robbin Call M.D./General    Surgical Staff:  Assistant: CHELO Holliday  Circulator: Caridad Mackay R.N.  Perfusionist: Leonardo Caldwell Circulator: Nargis Lubin R.N.  Scrub Person: Laurie Sherwood; Sen Lovett  Count Poteet: Raina Norton R.N.    Specimens removed if any:  ID Type Source Tests Collected by Time Destination   A : Aortic valve  Tissue Heart Valve PATHOLOGY SPECIMEN Katarina Markham M.D. 5/7/2020  9:16 AM        Estimated Blood Loss: Minimal    Findings: Severe aortic stenosis, mild mitral regurgitation    Complications: None        5/7/2020 10:30 AM Katarina Markham M.D.

## 2020-05-07 NOTE — ANESTHESIA PROCEDURE NOTES
Arterial Line  Performed by: Robbin Call M.D.  Authorized by: Robbin Call M.D.     Start Time:  5/7/2020 7:38 AM  End Time:  5/7/2020 7:39 AM  Localization: surface landmarks    Patient Location:  OR  Indication: continuous blood pressure monitoring        Catheter Size:  20 G  Seldinger Technique?: Yes    Laterality:  Right  Site:  Radial artery  Line Secured:  Antimicrobial disc, tape and transparent dressing  Events: patient tolerated procedure well with no complications

## 2020-05-07 NOTE — ASSESSMENT & PLAN NOTE
Status post aortic valve replacement 5/7 with Dr. Markham  Continue routine postoperative management  Discontinue chest tubes  Epicardial pacing wires  Strict blood pressure control titrating Cleviprex drip, lisinopril added  Continue to titrate insulin drip for strict glycemic control  PRN analgesics  Aspirin --> Coumadin start tonight

## 2020-05-07 NOTE — OP REPORT
DATE OF SERVICE:  05/07/2020    REFERRING PHYSICIAN:  Miah Chaudhary MD    PREOPERATIVE DIAGNOSES:  Severe symptomatic aortic stenosis, recent   endocarditis (Streptococcus viridans), mild mitral regurgitation.    POSTOPERATIVE DIAGNOSES:  Severe symptomatic aortic stenosis, recent   endocarditis (Streptococcus viridans), mild mitral regurgitation.    PROCEDURES PERFORMED:  Aortic valve replacement (25 mm Inspiris Mederos   pericardial valve) and intraoperative transesophageal echocardiography.    SURGEON:  Katarina Markham MD    FIRST ASSISTANT:  STEPHANE Benjamin    SECOND ASSISTANT:  Byron Wilson DO    ANESTHESIOLOGIST:  Robbin Call MD    ANESTHESIA:  General endotracheal.    ESTIMATED BLOOD LOSS:  Minimal.    DRAINS:  Mediastinal chest tubes x2 (32-[place] Island[place] straight and angled).    MISCELLANEOUS:  Temporary epicardial ventricular pacemaker wires.    COMPLICATIONS:  None.    INDICATIONS:  The patient is a 55-year-old male with a recent admission to the   hospital for treatment of suspected mitral valve endocarditis.  He had   bacteremia and grew Streptococcus viridans.  He received a full course of   intravenous antibiotics.  His last transesophageal echocardiogram did not show   any mitral valve vegetations.  There was severe aortic stenosis and   regurgitation.  Cardiac catheterization did not show any hemodynamically   significant coronary artery disease.    PROCEDURE:  The patient was brought to the operating room and placed on the   operating room table in the supine position.  After successful induction of   general anesthesia and endotracheal intubation, the patient was prepped and   draped in the usual sterile fashion.  STEPHANE Benjamin, was the first   assistant during the operation.  She provided retraction and exposure and   closed the sternal wound.  Dr. Wilson was the second assistant.    Intraoperative transesophageal echocardiography showed severe aortic stenosis   and  regurgitation and normal-appearing and functioning mitral valve with only   trace-to-mild mitral regurgitation.  His left ventricular ejection fraction   was approximately 60%.  An incision was made from the sternal notch to the   xiphoid.  The sternum was opened longitudinally with a sternal saw.    Hemostasis was obtained with electrocautery and a limited amount of bone wax   at the sternal edges.  The patient was systemically heparinized.  The   pericardium was opened longitudinally and tented anteriorly with Ethibond stay   stitches.  The aortic cannula was inserted first followed by a dual-stage   venous cannula.  An antegrade cardioplegia cannula was placed in the ascending   aorta and a retrograde one in the coronary sinus.  Cardiopulmonary bypass was   instituted.  The aorta was cross-clamped and the patient was given 800 mL of   cold cardioplegia in an antegrade fashion followed by 400 mL in a retrograde   fashion.  There was delayed cardiac arrest.  Ice slush was placed on the heart   for further myocardial protection.  A phrenic nerve protector pad was used.    From this point on, cardioplegia was given in a retrograde fashion every 20   minutes while the aorta was cross-clamped.  A transverse aortotomy was   performed.  The aortic valve was trileaflet and heavily calcified.  The   leaflets were excised and the annulus was debrided with a rongeur.  Pledgeted   #2-0 Ethibond stitches were then placed around the aortic valve annulus in a   horizontal mattress fashion with the pledgets in a subannular position.  A 25   mm Inspiris Mederos pericardial valve was then placed in the aortic valve   annulus and secured in place with the Ethibond stitches utilizing the Cor-Knot   device.  The valve was properly positioned and both coronary ostia were   visualized and were patent.  Rewarming of the patient was initiated.  The   aortotomy was closed in 2 layers using #5-0 Prolene sutures.  The aortic   cross-clamp  was removed.  Aortic cross-clamp time was 58 minutes.  Total   cardiopulmonary bypass time was 76 minutes.  The left ventricle was deaired in   the usual fashion.  The carbon dioxide, which had been released over the   operative field during the operation, was discontinued.  The retrograde   cardioplegia cannula was removed.  A straight and an angled 32-[place]guill[place] chest   tubes were placed in mediastinum.  Temporary epicardial ventricular pacemaker   wires were inserted.  There was spontaneous conversion into sinus rhythm.  The   antegrade cardioplegia cannula was removed.  When he was adequately warmed,   he was slowly taken off cardiopulmonary bypass, which he tolerated well.  The   dual-stage venous cannula was removed.  Protamine was given to reverse the   effects of heparin.  The aortic cannula was removed.  When adequate hemostasis   had been obtained, the sternum was reapproximated using size 5 sternal wires   and the remainder of the incision was closed in 3 layers using Vicryl sutures.    Intraoperative transesophageal echocardiography showed a properly positioned   and functioning aortic valve bioprosthesis without any paravalvular or   central leaks.  His left ventricular ejection fraction remained normal at   approximately 60%.  There was still trace-to-mild mitral regurgitation.  There   were no apparent complications.  The patient tolerated the procedure well and   left the operating room in guarded condition.       ____________________________________     Katarina HUANG    DD:  05/07/2020 10:41:35  DT:  05/07/2020 11:38:55    D#:  [id number]  Job#:  [id number]

## 2020-05-07 NOTE — ANESTHESIA PROCEDURE NOTES
Airway  Date/Time: 5/7/2020 7:45 AM  Performed by: Robbin Call M.D.  Authorized by: Robbin Call M.D.     Location:  OR  Urgency:  Elective  Difficult Airway: No    Indications for Airway Management:  Anesthesia      Spontaneous Ventilation: absent    Sedation Level:  Deep  Preoxygenated: Yes    Patient Position:  Sniffing  Mask Difficulty Assessment:  0 - not attempted  Final Airway Type:  Endotracheal airway  Final Endotracheal Airway:  ETT  Cuffed: Yes    Technique Used for Successful ETT Placement:  Direct laryngoscopy    Insertion Site:  Oral  Blade Type:  Anayeli  Laryngoscope Blade/Videolaryngoscope Blade Size:  3  ETT Size (mm):  8.5  Measured from:  Teeth  ETT to Teeth (cm):  23  Placement Verified by: auscultation and capnometry    Cormack-Lehane Classification:  Grade I - full view of glottis  Number of Attempts at Approach:  1

## 2020-05-07 NOTE — RESPIRATORY CARE
Extubation    Cuff leak noted yes  Stridor present no     O2 (LPM): 4 lpm nc   Patient toleration good  RCP Complete? yes

## 2020-05-07 NOTE — PROGRESS NOTES
Pt placed in S114 at 1100.  Report received from Dr. Miller.  Dr. Gonda at bedside assessing pt.  Levo gtt titrated off.  Precedex at 0.4.  Close observation maintained.

## 2020-05-07 NOTE — TELEPHONE ENCOUNTER
Called andriy's wife Era to update her on the changed visiting policy that surgery patient's will not be allowed her to visit.  She understood and acknowledged the change    Call time 4 minutes.

## 2020-05-07 NOTE — ANESTHESIA QCDR
2019 [facility] Clinical Data Registry (for Quality Improvement)     Postoperative nausea/vomiting risk protocol (Adult = 18 yrs and Pediatric 3-17 yrs)- (430 and 463)  General inhalation anesthetic (NOT TIVA) with PONV risk factors: No  Provision of anti-emetic therapy with at least 2 different classes of agents: N/A  Patient DID NOT receive anti-emetic therapy and reason is documented in Medical Record: N/A    Multimodal Pain Management- (477)  Non-emergent surgery AND patient age >= 18: No  Use of Multimodal Pain Management, two or more drugs and/or interventions, NOT including systemic opioids:   Exception: Documented allergy to multiple classes of analgesics:     Smoking Abstinence (404)  Patient is current smoker (cigarette, pipe, e-cig, marijuanna): No  Elective Surgery:   Abstinence instructions provided prior to day of surgery:   Patient abstained from smoking on day of surgery:     Pre-Op Beta-Blocker in Isolated CABG (44)  Isolated CABG AND patient age >= 18: No  Beta-blocker admin within 24 hours of surgical incision:   Exception:of medical reason(s) for not administering beta blocker within 24 hours prior to surgical incision (e.g., not  indicated,other medical reason):     PACU assessment of acute postoperative pain prior to Anesthesia Care End- Applies to Patients Age = 18- (ABG7)  Initial PACU pain score is which of the following: < 7/10  Patient unable to report pain score: N/A    Post-anesthetic transfer of care checklist/protocol to PACU/ICU- (426 and 427)  Upon conclusion of case, patient transferred to which of the following locations: PACU/Non-ICU  Use of transfer checklist/protocol: Yes  Exclusion: Service Performed in Patient Hospital Room (and thus did not require transfer): N/A  Unplanned admission to ICU related to anesthesia service up through end of PACU care- (MD51)  Unplanned admission to ICU (not initially anticipated at anesthesia start time): No

## 2020-05-07 NOTE — ANESTHESIA PREPROCEDURE EVALUATION
"    Relevant Problems   NEURO   (+) History of SBE (subacute bacterial endocarditis)      CARDIAC   (+) Aortic stenosis, severe   (+) Hypertension     /55 Comment: right arm  Pulse 80   Temp 36.3 °C (97.4 °F) (Temporal)   Resp 18   Ht 1.753 m (5' 9\")   Wt 88.5 kg (195 lb 1.7 oz)   SpO2 96%   BMI 28.81 kg/m²     Physical Exam    Airway   Mallampati: II  TM distance: >3 FB  Neck ROM: full       Cardiovascular - normal exam  Rhythm: regular  Rate: normal  (-) murmur     Dental - normal exam           Pulmonary - normal exam  Breath sounds clear to auscultation     Abdominal    Neurological - normal exam                 Anesthesia Plan    ASA 4   ASA physical status 4 criteria: severe valve dysfunction    Plan - general       Airway plan will be ETT        Induction: intravenous    Postoperative Plan: Postoperative administration of opioids is intended.    Pertinent diagnostic labs and testing reviewed    Informed Consent:    Anesthetic plan and risks discussed with patient.    Use of blood products discussed with: patient whom consented to blood products.         "

## 2020-05-07 NOTE — ANESTHESIA TIME REPORT
Anesthesia Start and Stop Event Times     Date Time Event    5/7/2020 0713 Ready for Procedure     0730 Anesthesia Start     1110 Anesthesia Stop        Responsible Staff  05/07/20    Name Role Begin End    Robbin Call M.D. Anesth 0730 1110        Preop Diagnosis (Free Text):  Pre-op Diagnosis     MITRAL REGURGITATION, AORTIC STENOSIS, ENDOCARDITIS        Preop Diagnosis (Codes):  Diagnosis Information     Diagnosis Code(s): Mitral regurgitation [I34.0]     Aortic stenosis [I35.0]     Endocarditis [I38]        Post op Diagnosis  Severe aortic stenosis      Premium Reason  Non-Premium    Comments: art line, central line, flaco

## 2020-05-07 NOTE — ASSESSMENT & PLAN NOTE
Intubated and extubated 5/7 per protocol  Encourage incentive spirometry/PEP  Titrate FiO2 to goal SaO2 greater than 92%  RT/O2 protocol  Diuresis  mobilization

## 2020-05-07 NOTE — ANESTHESIA PROCEDURE NOTES
Central Venous Line  Performed by: Robbin Call M.D.  Authorized by: Robbin Call M.D.     Start Time:  5/7/2020 7:51 AM  End Time:  5/7/2020 7:54 AM  Patient Location:  OR  Indication: central venous access and hemodynamic monitoring        provider hand hygiene performed prior to central venous catheter insertion, all 5 sterile barriers used (gloves, gown, cap, mask, large sterile drape) during central venous catheter insertion and skin prep agent completely dried prior to procedure    Patient Position:  Trendelenburg  Site:  Internal jugular  Prep:  Chlorhexidine  Catheter Size:  7 Fr  Catheter Length (cm):  20  Number of Lumens:  Triple lumen  target vein identified, needle advanced into vein and blood aspirated and guidewire advanced into vein    Seldinger Technique?: Yes    Ultrasound-Guided: ultrasound-guided  Image captured, interpreted and electronically stored.  Sterile Gel and Probe Cover Used for Ultrasound?: Yes    Intravenous Verification: verified by ultrasound, venous blood return and chest x-ray pending    all ports aspirated, all ports flushed easily, guidewire was removed intact, biopatch was applied, line was sutured in place and dressing was applied    Events: patient tolerated procedure well with no complications    PA Catheter Placed?: No

## 2020-05-07 NOTE — CONSULTS
Critical Care Consultation    Date of consult: 5/7/2020    Referring Physician  Katarina Markham M.D.    Reason for Consultation  Symptomatic severe aortic stenosis    History of Presenting Illness  55 y.o. 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 Markham.  Procedure was performed without complications and patient came off pump without difficulty.  He is brought to the intensive care unit on full mechanical ventilatory support, Precedex drip, insulin drip and NE gtt.  I was consulted for critical care management.    Code Status  Full Code    Review of Systems  Review of Systems   Unable to perform ROS: Intubated       Past Medical History   has a past medical history of Hyperlipidemia and Hypertension. -Strep viridans endocarditis    Surgical History   has no past surgical history on file.    Family History  family history includes Cancer in his sister; Diabetes in his maternal grandmother and mother; Heart Disease in his mother; No Known Problems in his father; Stroke in his mother.    Social History   reports that he has never smoked. He has never used smokeless tobacco. He reports previous alcohol use of about 1.2 oz of alcohol per week. He reports current drug use. Drug: Cocaine.    Medications  Home Medications     Reviewed by Caridad Mackay R.N. (Registered Nurse) on 05/07/20 at 0812  Med List Status: Complete   Medication Last Dose Status   acetaminophen (TYLENOL) 325 MG Tab 5/6/2020 Active   allopurinol (ZYLOPRIM) 100 MG Tab 5/6/2020 Active   aminocaproic acid (AMICAR) 5 g in  mL Infusion  Active   aminocaproic acid (AMICAR) 8.8 g in  mL IV Bolus  Active   aspirin EC 81 MG EC tablet >week Active   atorvastatin (LIPITOR) 10 MG Tab 5/6/2020 Active   ceFAZolin  in dextrose (ANCEF) IVPB premix 2 g  Active   chlorhexidine (PERIDEX) 0.12 % Solution 5/6/2020 Active   Cholecalciferol (VITAMIN D3 PO) 5/6/2020 Active   clindamycin (CLEOCIN) 300 MG Cap 5/6/2020 Active   Dexmedetomidine HCl-Dextrose 200MCG/50ML -5% SOLN  Active   Dextromethorphan-guaiFENesin (MUCINEX DM PO) 5/6/2020 Active   EPINEPHrine (Adrenalin) infusion 4 mg/250 mL (premix)  Active   famotidine (PEPCID) 20 MG Tab 5/4/2020 Active   ibuprofen (MOTRIN) 200 MG Tab 5/6/2020 Active   insulin regular human (HUMULIN/NOVOLIN R) 62.5 Units in  mL infusion per protocol  Active   K+ Scale: Goal of 4.5  Active   lisinopril (PRINIVIL) 10 MG Tab 5/6/2020 Active   loratadine (CLARITIN) 10 MG Tab 5/6/2020 Active   multivitamin (THERAGRAN) Tab 5/6/2020 Active   norepinephrine (Levophed) infusion 8 mg/250 mL (premix)  Active              Current Facility-Administered Medications   Medication Dose Route Frequency Provider Last Rate Last Dose   • ceFAZolin in dextrose (ANCEF) IVPB premix 2 g  2 g Intravenous Once Katarina Markham M.D.       • K+ Scale: Goal of 4.5  1 Each Intravenous Q6HRS Dominique Narayanan, A.P.N.       • insulin regular human (HUMULIN/NOVOLIN R) 62.5 Units in  mL infusion per protocol  1-6 Units/hr Intravenous Continuous Katarina Markham M.D.       • EPINEPHrine (Adrenalin) infusion 4 mg/250 mL (premix)  0-0.2 mcg/kg/min Intravenous Continuous Katarina Markham M.D.       • norepinephrine (Levophed) infusion 8 mg/250 mL (premix)  0-30 mcg/min Intravenous Continuous Katarina Markham M.D.       • Dexmedetomidine HCl-Dextrose 200MCG/50ML -5% SOLN  0-1.5 mcg/kg/hr Intravenous Continuous Katarina Markham M.D. 8.8 mL/hr at 05/07/20 0757 0.4 mcg/kg/hr at 05/07/20 0757     Facility-Administered Medications Ordered in Other Encounters   Medication Dose Route Frequency Provider Last Rate Last Dose   • Lactated Ringers    Intra-Op Continuous Robbin Call M.D.       • electrolyte-A (PLASMALYTE-A) infusion    Intra-Op  Continuous Robbin Call M.D.       • NS infusion    Intra-Op Continuous Robbin Call M.D.       • ceFAZolin (ANCEF) injection    PRJAYLENE Call M.D.   2 g at 05/07/20 0758   • propofol (DIPRIVAN) injection    PRJAYLENE Call M.D.   50 mg at 05/07/20 0802   • succinylcholine injection    PRJAYLENE Call M.D.   100 mg at 05/07/20 0742   • rocuronium (ZEMURON) injection    PRN Robbin Call M.D.   30 mg at 05/07/20 0828   • heparin OR USE ONLY    PRJAYLENE Call M.D.   35,000 Units at 05/07/20 0818   • methadone (DOLOPHINE) injection    PRJAYLENE Call M.D.   10 mg at 05/07/20 0808   • norepinephrine (LEVOPHED) 8 mg in  mL Infusion    Intra-Op Continuous Robbin Call M.D.   Stopped at 05/07/20 0833   • insulin regular (HUMULIN R) injection    PRJAYLENE Call M.D.   7.5 Units at 05/07/20 0944   • protamine injection    PRJAYLENE Call M.D.   350 mg at 05/07/20 0958   • clevidipine (CLEVIPREX) IV emulsion    Intra-Op Continuous Robbin Call M.D.   Stopped at 05/07/20 1008       Allergies  Allergies   Allergen Reactions   • Morphine Hives       Vital Signs last 24 hours  Temp:  [36.3 °C (97.4 °F)-36.4 °C (97.6 °F)] 36.3 °C (97.4 °F)  Pulse:  [80] 80  Resp:  [18] 18  BP: (135)/(55) 135/55  SpO2:  [96 %] 96 %    Physical Exam  Physical Exam  Vitals signs and nursing note reviewed.   Constitutional:       General: He is in acute distress.      Appearance: He is normal weight.      Interventions: He is sedated, chemically paralyzed and intubated.   HENT:      Head: Normocephalic and atraumatic.      Right Ear: External ear normal.      Left Ear: External ear normal.      Nose: Nose normal. No congestion.      Mouth/Throat:      Mouth: Mucous membranes are moist.      Pharynx: Oropharynx is clear.      Comments: Endotracheal tube in position  Eyes:      General: No scleral icterus.     Conjunctiva/sclera: Conjunctivae normal.      Pupils: Pupils are equal, round, and reactive to light.   Neck:       Musculoskeletal: Neck supple. No neck rigidity.   Cardiovascular:      Rate and Rhythm: Normal rate and regular rhythm. Occasional extrasystoles are present.     Chest Wall: PMI is not displaced.      Pulses: Decreased pulses.           Radial pulses are 1+ on the right side and 1+ on the left side.      Heart sounds: Heart sounds are distant. Murmur present.      Comments: Requiring a norepinephrine infusion for shock, mediastinal chest tubes in position, epicardial pacing wires, sternotomy incision is clean/dry/intact  Pulmonary:      Effort: No accessory muscle usage. He is intubated.      Breath sounds: Examination of the right-lower field reveals rales. Examination of the left-lower field reveals rhonchi and rales. Rhonchi and rales present. No wheezing.      Comments: ItsGood lung compliance, ASV mode of ventilation with 160% support, PEEP of 8, 80% FiO2  Abdominal:      General: Bowel sounds are normal. There is no distension.      Palpations: Abdomen is soft.      Tenderness: There is no abdominal tenderness.   Genitourinary:     Comments: Murray catheter in place  Musculoskeletal:         General: No tenderness.      Right lower leg: No edema.      Left lower leg: No edema.   Skin:     General: Skin is warm and dry.      Capillary Refill: Capillary refill takes 2 to 3 seconds.      Findings: No rash.   Neurological:      Comments: Sedated and paralyzed   Psychiatric:      Comments: Unable to assess given current clinical condition         Fluids  No intake or output data in the 24 hours ending 20 1036    Laboratory  Recent Results (from the past 48 hour(s))   EKG    Collection Time: 20 11:02 AM   Result Value Ref Range    Report       [facility]    Test Date:  2020  Pt Name:    DEBBIE NIEVES               Department: [facility]  MRN:        [id number]                      Room:  Gender:     Male                         Technician: [facility]  :        1965                   Requested  By:TARIQ GAYTAN  Order #:    [id number]                    Reading MD: Jadon Garcia MD    Measurements  Intervals                                Axis  Rate:       88                           P:          66  WV:         180                          QRS:        70  QRSD:       90                           T:          61  QT:         376  QTc:        455    Interpretive Statements  SINUS RHYTHM  VENTRICULAR PREMATURE COMPLEX  PROBABLE LEFT ATRIAL ABNORMALITY  PROBABLE LEFT VENTRICULAR HYPERTROPHY  Compared to ECG 03/17/2020 07:45:35  Ventricular premature complex(es) now present  First degree AV block no longer present  Electronically Signed On 5-6-2020 22:55:59 PDT by Jadon Garcia MD     Comp Metabolic Panel (CMP)    Collection Time: 05/06/20 11:05 AM   Result Value Ref Range    Sodium 135 135 - 145 mmol/L    Potassium 5.4 3.6 - 5.5 mmol/L    Chloride 102 96 - 112 mmol/L    Co2 24 20 - 33 mmol/L    Anion Gap 9.0 7.0 - 16.0    Glucose 106 (H) 65 - 99 mg/dL    Bun 20 8 - 22 mg/dL    Creatinine 0.70 0.50 - 1.40 mg/dL    Calcium 9.8 8.5 - 10.5 mg/dL    AST(SGOT) 26 12 - 45 U/L    ALT(SGPT) 30 2 - 50 U/L    Alkaline Phosphatase 85 30 - 99 U/L    Total Bilirubin 0.4 0.1 - 1.5 mg/dL    Albumin 4.3 3.2 - 4.9 g/dL    Total Protein 7.6 6.0 - 8.2 g/dL    Globulin 3.3 1.9 - 3.5 g/dL    A-G Ratio 1.3 g/dL   CBC with Differential    Collection Time: 05/06/20 11:05 AM   Result Value Ref Range    WBC 8.5 4.8 - 10.8 K/uL    RBC 4.24 (L) 4.70 - 6.10 M/uL    Hemoglobin 13.5 (L) 14.0 - 18.0 g/dL    Hematocrit 40.2 (L) 42.0 - 52.0 %    MCV 94.8 81.4 - 97.8 fL    MCH 31.8 27.0 - 33.0 pg    MCHC 33.6 (L) 33.7 - 35.3 g/dL    RDW 44.8 35.9 - 50.0 fL    Platelet Count 255 164 - 446 K/uL    MPV 10.4 9.0 - 12.9 fL    Neutrophils-Polys 56.00 44.00 - 72.00 %    Lymphocytes 27.20 22.00 - 41.00 %    Monocytes 10.00 0.00 - 13.40 %    Eosinophils 5.70 0.00 - 6.90 %    Basophils 0.90 0.00 - 1.80 %    Immature Granulocytes 0.20 0.00 - 0.90 %     Nucleated RBC 0.00 /100 WBC    Neutrophils (Absolute) 4.77 1.82 - 7.42 K/uL    Lymphs (Absolute) 2.32 1.00 - 4.80 K/uL    Monos (Absolute) 0.85 0.00 - 0.85 K/uL    Eos (Absolute) 0.49 0.00 - 0.51 K/uL    Baso (Absolute) 0.08 0.00 - 0.12 K/uL    Immature Granulocytes (abs) 0.02 0.00 - 0.11 K/uL    NRBC (Absolute) 0.00 K/uL   Hemoglobin  A1c    Collection Time: 05/06/20 11:05 AM   Result Value Ref Range    Glycohemoglobin 5.3 0.0 - 5.6 %    Est Avg Glucose 105 mg/dL   Prothrombin time (INR)    Collection Time: 05/06/20 11:05 AM   Result Value Ref Range    PT 12.9 12.0 - 14.6 sec    INR 0.95 0.87 - 1.13   APTT (PTT)    Collection Time: 05/06/20 11:05 AM   Result Value Ref Range    APTT 30.2 24.7 - 36.0 sec   COD (Adult)    Collection Time: 05/06/20 11:05 AM   Result Value Ref Range    ABO Grouping Only AB     Rh Grouping Only POS     Antibody Screen-Cod NEG    ESTIMATED GFR    Collection Time: 05/06/20 11:05 AM   Result Value Ref Range    GFR If African American >60 >60 mL/min/1.73 m 2    GFR If Non African American >60 >60 mL/min/1.73 m 2   Urinalysis    Collection Time: 05/06/20 11:30 AM   Result Value Ref Range    Color Yellow     Character Clear     Specific Gravity 1.018 <1.035    Ph 7.0 5.0 - 8.0    Glucose Negative Negative mg/dL    Ketones Negative Negative mg/dL    Protein Negative Negative mg/dL    Bilirubin Negative Negative    Urobilinogen, Urine 0.2 Negative    Nitrite Negative Negative    Leukocyte Esterase Negative Negative    Occult Blood Negative Negative    Micro Urine Req see below    ABO Rh Confirm    Collection Time: 05/07/20  6:15 AM   Result Value Ref Range    ABO Rh Confirm AB POS        Imaging  DX-CHEST-PORTABLE (1 VIEW)    (Results Pending)    *Personally reviewed chest x-ray showing routine postoperative changes including enlarged cardiac silhouette, sternotomy wires, mild edema pattern, endotracheal tube/central venous catheter in good position    Assessment/Plan  * Aortic stenosis, severe-  (present on admission)  Assessment & Plan  Status post aortic valve replacement 5/7 with Dr. Markham  Routine postoperative management  Monitor chest tube output and correct bleeding if recurs  Epicardial pacing wires  Titrate norepinephrine to maintain mean arterial pressure greater than 65  Strict blood pressure control  Titrate insulin drip for strict glycemic control  PRN analgesics  Aspirin    Encounter for weaning from ventilator (HCC)  Assessment & Plan  Intubated in operating room 5/7 without difficulty  Continue full mechanical ventilatory support using ASV mode weaning as able  Titrate FiO2 to goal SaO2 greater than 92%  RT/O2 protocol  Titrate Precedex drip for appropriate sedation  Early mobilization    History of SBE (subacute bacterial endocarditis)- (present on admission)  Assessment & Plan  Did not require surgical intervention status post appropriate IV antibiotic course  Monitor    Hypertension- (present on admission)  Assessment & Plan  Currently in shock  Hold antihypertensives    Hyperkalemia  Assessment & Plan  Likely post pump related  Monitor and should resolve spontaneously  Medical management if worsens, continuous telemetry monitoring    Acute blood loss as cause of postoperative anemia  Assessment & Plan  Daily CBC with conservative transfusion strategy  Reverse bleeding diathesis if recurs      Discussed patient condition and risk of morbidity and/or mortality with RN, RT, Pharmacy, , Charge nurse / hot rounds, CVS and Anesthesiology.    The patient remains critically ill.  Critical care time = 35 minutes in directly providing and coordinating critical care and extensive data review.  No time overlap and excludes procedures.

## 2020-05-08 ENCOUNTER — APPOINTMENT (OUTPATIENT)
Dept: RADIOLOGY | Facility: MEDICAL CENTER | Age: 55
DRG: 220 | End: 2020-05-08
Attending: NURSE PRACTITIONER
Payer: COMMERCIAL

## 2020-05-08 LAB
ACT BLD: 125 SEC (ref 74–137)
ACT BLD: 483 SEC (ref 74–137)
ACT BLD: 543 SEC (ref 74–137)
ACTION RANGE TRIGGERED IACRT: NO
ACTION RANGE TRIGGERED IACRT: NO
ANION GAP SERPL CALC-SCNC: 11 MMOL/L (ref 7–16)
BASE EXCESS BLDA CALC-SCNC: -2 MMOL/L (ref -4–3)
BASE EXCESS BLDA CALC-SCNC: -3 MMOL/L (ref -4–3)
BODY TEMPERATURE: ABNORMAL DEGREES
BODY TEMPERATURE: ABNORMAL DEGREES
BUN SERPL-MCNC: 20 MG/DL (ref 8–22)
CA-I BLD ISE-SCNC: 1.09 MMOL/L (ref 1.1–1.3)
CA-I BLD ISE-SCNC: 1.29 MMOL/L (ref 1.1–1.3)
CALCIUM SERPL-MCNC: 8.6 MG/DL (ref 8.5–10.5)
CHLORIDE SERPL-SCNC: 102 MMOL/L (ref 96–112)
CO2 BLDA-SCNC: 24 MMOL/L (ref 20–33)
CO2 BLDA-SCNC: 25 MMOL/L (ref 20–33)
CO2 SERPL-SCNC: 19 MMOL/L (ref 20–33)
CREAT SERPL-MCNC: 0.75 MG/DL (ref 0.5–1.4)
EKG IMPRESSION: NORMAL
ERYTHROCYTE [DISTWIDTH] IN BLOOD BY AUTOMATED COUNT: 45.7 FL (ref 35.9–50)
GLUCOSE BLD-MCNC: 103 MG/DL (ref 65–99)
GLUCOSE BLD-MCNC: 104 MG/DL (ref 65–99)
GLUCOSE BLD-MCNC: 105 MG/DL (ref 65–99)
GLUCOSE BLD-MCNC: 107 MG/DL (ref 65–99)
GLUCOSE BLD-MCNC: 114 MG/DL (ref 65–99)
GLUCOSE BLD-MCNC: 115 MG/DL (ref 65–99)
GLUCOSE BLD-MCNC: 119 MG/DL (ref 65–99)
GLUCOSE BLD-MCNC: 122 MG/DL (ref 65–99)
GLUCOSE BLD-MCNC: 78 MG/DL (ref 65–99)
GLUCOSE BLD-MCNC: 94 MG/DL (ref 65–99)
GLUCOSE BLD-MCNC: 96 MG/DL (ref 65–99)
GLUCOSE BLD-MCNC: 97 MG/DL (ref 65–99)
GLUCOSE BLD-MCNC: 97 MG/DL (ref 65–99)
GLUCOSE SERPL-MCNC: 124 MG/DL (ref 65–99)
HCO3 BLDA-SCNC: 22.8 MMOL/L (ref 17–25)
HCO3 BLDA-SCNC: 24 MMOL/L (ref 17–25)
HCT VFR BLD AUTO: 36 % (ref 42–52)
HCT VFR BLD CALC: 25 % (ref 42–52)
HCT VFR BLD CALC: 36 % (ref 42–52)
HGB BLD-MCNC: 12.1 G/DL (ref 14–18)
HGB BLD-MCNC: 12.2 G/DL (ref 14–18)
HGB BLD-MCNC: 8.5 G/DL (ref 14–18)
HOROWITZ INDEX BLDA+IHG-RTO: 100 MM[HG]
HOROWITZ INDEX BLDA+IHG-RTO: 461 MM[HG]
INR PPP: 1.13 (ref 0.87–1.13)
INST. QUALIFIED PATIENT IIQPT: YES
INST. QUALIFIED PATIENT IIQPT: YES
MCH RBC QN AUTO: 31.9 PG (ref 27–33)
MCHC RBC AUTO-ENTMCNC: 33.6 G/DL (ref 33.7–35.3)
MCV RBC AUTO: 95 FL (ref 81.4–97.8)
O2/TOTAL GAS SETTING VFR VENT: 100 %
O2/TOTAL GAS SETTING VFR VENT: 100 %
PCO2 BLDA: 44.3 MMHG (ref 26–37)
PCO2 BLDA: 46.8 MMHG (ref 26–37)
PH BLDA: 7.32 [PH] (ref 7.4–7.5)
PH BLDA: 7.32 [PH] (ref 7.4–7.5)
PLATELET # BLD AUTO: 194 K/UL (ref 164–446)
PMV BLD AUTO: 10.4 FL (ref 9–12.9)
PO2 BLDA: 100 MMHG (ref 64–87)
PO2 BLDA: 461 MMHG (ref 64–87)
POTASSIUM BLD-SCNC: 4.8 MMOL/L (ref 3.6–5.5)
POTASSIUM BLD-SCNC: 6.2 MMOL/L (ref 3.6–5.5)
POTASSIUM SERPL-SCNC: 4.9 MMOL/L (ref 3.6–5.5)
PROTHROMBIN TIME: 14.8 SEC (ref 12–14.6)
RBC # BLD AUTO: 3.79 M/UL (ref 4.7–6.1)
SAO2 % BLDA: 100 % (ref 93–99)
SAO2 % BLDA: 97 % (ref 93–99)
SODIUM BLD-SCNC: 133 MMOL/L (ref 135–145)
SODIUM BLD-SCNC: 141 MMOL/L (ref 135–145)
SODIUM SERPL-SCNC: 132 MMOL/L (ref 135–145)
SPECIMEN DRAWN FROM PATIENT: ABNORMAL
SPECIMEN DRAWN FROM PATIENT: ABNORMAL
WBC # BLD AUTO: 18.2 K/UL (ref 4.8–10.8)

## 2020-05-08 PROCEDURE — 94669 MECHANICAL CHEST WALL OSCILL: CPT

## 2020-05-08 PROCEDURE — 700102 HCHG RX REV CODE 250 W/ 637 OVERRIDE(OP): Performed by: NURSE PRACTITIONER

## 2020-05-08 PROCEDURE — 700102 HCHG RX REV CODE 250 W/ 637 OVERRIDE(OP): Performed by: THORACIC SURGERY (CARDIOTHORACIC VASCULAR SURGERY)

## 2020-05-08 PROCEDURE — 85610 PROTHROMBIN TIME: CPT

## 2020-05-08 PROCEDURE — C9248 INJ, CLEVIDIPINE BUTYRATE: HCPCS | Performed by: NURSE PRACTITIONER

## 2020-05-08 PROCEDURE — 80048 BASIC METABOLIC PNL TOTAL CA: CPT

## 2020-05-08 PROCEDURE — 99024 POSTOP FOLLOW-UP VISIT: CPT | Performed by: THORACIC SURGERY (CARDIOTHORACIC VASCULAR SURGERY)

## 2020-05-08 PROCEDURE — A9270 NON-COVERED ITEM OR SERVICE: HCPCS | Performed by: NURSE PRACTITIONER

## 2020-05-08 PROCEDURE — 82962 GLUCOSE BLOOD TEST: CPT | Mod: 91

## 2020-05-08 PROCEDURE — 99291 CRITICAL CARE FIRST HOUR: CPT | Performed by: INTERNAL MEDICINE

## 2020-05-08 PROCEDURE — 51798 US URINE CAPACITY MEASURE: CPT

## 2020-05-08 PROCEDURE — 71045 X-RAY EXAM CHEST 1 VIEW: CPT

## 2020-05-08 PROCEDURE — A9270 NON-COVERED ITEM OR SERVICE: HCPCS | Performed by: THORACIC SURGERY (CARDIOTHORACIC VASCULAR SURGERY)

## 2020-05-08 PROCEDURE — 93005 ELECTROCARDIOGRAM TRACING: CPT | Performed by: NURSE PRACTITIONER

## 2020-05-08 PROCEDURE — 85027 COMPLETE CBC AUTOMATED: CPT

## 2020-05-08 PROCEDURE — 700111 HCHG RX REV CODE 636 W/ 250 OVERRIDE (IP): Performed by: NURSE PRACTITIONER

## 2020-05-08 PROCEDURE — 93010 ELECTROCARDIOGRAM REPORT: CPT | Performed by: INTERNAL MEDICINE

## 2020-05-08 PROCEDURE — 700111 HCHG RX REV CODE 636 W/ 250 OVERRIDE (IP): Performed by: THORACIC SURGERY (CARDIOTHORACIC VASCULAR SURGERY)

## 2020-05-08 PROCEDURE — 770022 HCHG ROOM/CARE - ICU (200)

## 2020-05-08 RX ORDER — LISINOPRIL 5 MG/1
5 TABLET ORAL DAILY
Status: DISCONTINUED | OUTPATIENT
Start: 2020-05-09 | End: 2020-05-09

## 2020-05-08 RX ORDER — ALLOPURINOL 100 MG/1
200 TABLET ORAL 2 TIMES DAILY
Status: DISCONTINUED | OUTPATIENT
Start: 2020-05-08 | End: 2020-05-14 | Stop reason: HOSPADM

## 2020-05-08 RX ORDER — LISINOPRIL 20 MG/1
10 TABLET ORAL
Status: DISCONTINUED | OUTPATIENT
Start: 2020-05-08 | End: 2020-05-08

## 2020-05-08 RX ORDER — WARFARIN SODIUM 7.5 MG/1
7.5 TABLET ORAL
Status: COMPLETED | OUTPATIENT
Start: 2020-05-08 | End: 2020-05-08

## 2020-05-08 RX ORDER — KETOROLAC TROMETHAMINE 30 MG/ML
30 INJECTION, SOLUTION INTRAMUSCULAR; INTRAVENOUS EVERY 6 HOURS PRN
Status: DISCONTINUED | OUTPATIENT
Start: 2020-05-08 | End: 2020-05-09

## 2020-05-08 RX ORDER — ATORVASTATIN CALCIUM 10 MG/1
5 TABLET, FILM COATED ORAL DAILY
Status: DISCONTINUED | OUTPATIENT
Start: 2020-05-08 | End: 2020-05-14 | Stop reason: HOSPADM

## 2020-05-08 RX ORDER — LISINOPRIL 20 MG/1
10 TABLET ORAL DAILY
Status: DISCONTINUED | OUTPATIENT
Start: 2020-05-09 | End: 2020-05-08

## 2020-05-08 RX ORDER — FUROSEMIDE 10 MG/ML
40 INJECTION INTRAMUSCULAR; INTRAVENOUS DAILY
Status: DISCONTINUED | OUTPATIENT
Start: 2020-05-08 | End: 2020-05-09

## 2020-05-08 RX ORDER — POTASSIUM CHLORIDE 20 MEQ/1
20 TABLET, EXTENDED RELEASE ORAL DAILY
Status: DISCONTINUED | OUTPATIENT
Start: 2020-05-08 | End: 2020-05-09

## 2020-05-08 RX ORDER — FUROSEMIDE 10 MG/ML
20 INJECTION INTRAMUSCULAR; INTRAVENOUS ONCE
Status: COMPLETED | OUTPATIENT
Start: 2020-05-08 | End: 2020-05-08

## 2020-05-08 RX ORDER — WARFARIN SODIUM 5 MG/1
5 TABLET ORAL DAILY
Status: DISCONTINUED | OUTPATIENT
Start: 2020-05-09 | End: 2020-05-10

## 2020-05-08 RX ORDER — OXYCODONE HCL 10 MG/1
10 TABLET, FILM COATED, EXTENDED RELEASE ORAL EVERY 12 HOURS
Status: DISCONTINUED | OUTPATIENT
Start: 2020-05-08 | End: 2020-05-14 | Stop reason: HOSPADM

## 2020-05-08 RX ADMIN — ACETAMINOPHEN 1000 MG: 500 TABLET ORAL at 17:11

## 2020-05-08 RX ADMIN — SENNOSIDES AND DOCUSATE SODIUM 2 TABLET: 8.6; 5 TABLET ORAL at 05:25

## 2020-05-08 RX ADMIN — LISINOPRIL 10 MG: 20 TABLET ORAL at 06:03

## 2020-05-08 RX ADMIN — ASPIRIN 81 MG: 81 TABLET, COATED ORAL at 05:25

## 2020-05-08 RX ADMIN — MAGNESIUM SULFATE 1 G: 1 INJECTION INTRAVENOUS at 05:25

## 2020-05-08 RX ADMIN — OXYCODONE HYDROCHLORIDE 10 MG: 10 TABLET ORAL at 01:09

## 2020-05-08 RX ADMIN — ACETAMINOPHEN 1000 MG: 500 TABLET ORAL at 22:54

## 2020-05-08 RX ADMIN — OXYCODONE HYDROCHLORIDE 10 MG: 10 TABLET, FILM COATED, EXTENDED RELEASE ORAL at 17:11

## 2020-05-08 RX ADMIN — FUROSEMIDE 40 MG: 10 INJECTION, SOLUTION INTRAMUSCULAR; INTRAVENOUS at 09:04

## 2020-05-08 RX ADMIN — ALLOPURINOL 200 MG: 100 TABLET ORAL at 17:12

## 2020-05-08 RX ADMIN — FUROSEMIDE 20 MG: 10 INJECTION, SOLUTION INTRAMUSCULAR; INTRAVENOUS at 06:04

## 2020-05-08 RX ADMIN — OXYCODONE HYDROCHLORIDE 10 MG: 10 TABLET ORAL at 04:15

## 2020-05-08 RX ADMIN — ONDANSETRON 8 MG: 2 INJECTION INTRAMUSCULAR; INTRAVENOUS at 10:55

## 2020-05-08 RX ADMIN — ACETAMINOPHEN 1000 MG: 500 TABLET ORAL at 05:25

## 2020-05-08 RX ADMIN — OXYCODONE HYDROCHLORIDE 10 MG: 10 TABLET ORAL at 07:45

## 2020-05-08 RX ADMIN — GABAPENTIN 300 MG: 300 CAPSULE ORAL at 05:25

## 2020-05-08 RX ADMIN — SENNOSIDES AND DOCUSATE SODIUM 2 TABLET: 8.6; 5 TABLET ORAL at 17:11

## 2020-05-08 RX ADMIN — POTASSIUM CHLORIDE 20 MEQ: 1500 TABLET, EXTENDED RELEASE ORAL at 09:05

## 2020-05-08 RX ADMIN — ATORVASTATIN CALCIUM 5 MG: 10 TABLET, FILM COATED ORAL at 09:20

## 2020-05-08 RX ADMIN — WARFARIN SODIUM 7.5 MG: 7.5 TABLET ORAL at 17:12

## 2020-05-08 RX ADMIN — CLEVIPIDINE 8 MG/HR: 0.5 EMULSION INTRAVENOUS at 05:38

## 2020-05-08 RX ADMIN — GABAPENTIN 300 MG: 300 CAPSULE ORAL at 17:12

## 2020-05-08 RX ADMIN — CLEVIPIDINE 4 MG/HR: 0.5 EMULSION INTRAVENOUS at 01:18

## 2020-05-08 RX ADMIN — ALLOPURINOL 200 MG: 100 TABLET ORAL at 09:20

## 2020-05-08 RX ADMIN — ACETAMINOPHEN 1000 MG: 500 TABLET ORAL at 13:32

## 2020-05-08 RX ADMIN — OXYCODONE HYDROCHLORIDE 10 MG: 10 TABLET ORAL at 22:56

## 2020-05-08 ASSESSMENT — ENCOUNTER SYMPTOMS
VOMITING: 0
ABDOMINAL PAIN: 0
FEVER: 0
NEUROLOGICAL NEGATIVE: 1
SHORTNESS OF BREATH: 0
GASTROINTESTINAL NEGATIVE: 1
BACK PAIN: 0
HEADACHES: 0
BRUISES/BLEEDS EASILY: 0
CHILLS: 0
BLURRED VISION: 0
RESPIRATORY NEGATIVE: 1
SPUTUM PRODUCTION: 1
WHEEZING: 0
PSYCHIATRIC NEGATIVE: 1
DEPRESSION: 0
DIZZINESS: 0
CARDIOVASCULAR NEGATIVE: 1
NAUSEA: 0
PALPITATIONS: 0
SORE THROAT: 0
COUGH: 1
EYES NEGATIVE: 1
MYALGIAS: 1

## 2020-05-08 ASSESSMENT — FIBROSIS 4 INDEX: FIB4 SCORE: 1.32

## 2020-05-08 ASSESSMENT — PAIN SCALES - GENERAL: PAIN_LEVEL: 4

## 2020-05-08 NOTE — CARE PLAN
Problem: Day of surgery post CABG/Heart valve replacement  Goal: Stabilization in immediate post op period  Intervention: VS q 15 min x 4 hours, then q 1 hour. Include temperature immediately upon arrival. Check CO/CI q 2-4 hours and PRN  Note: Pt monitored q15 mins via arterial line and monitor.   Intervention: If radial artery used, elevate arm, no BP checks or needle sticks from affected arm, monitor ulnar pulse and capillary refill  Note: Radial art line in place.  BP cuff on opposite arm.   Intervention: First post op hour labs and diagnostics per MD order  Note: Labs drawn per protocol.   Intervention: Serum K q 6 hours x 24 hours.  ABG and CBC prn.  Note: K monitored q6h.   Intervention: For FSBS greater than 130, start Post Cardiac Surgery Insulin Drip Protocol  Note: Insulin gtt started when bg >130  Intervention: FSBS frequency as per Cardiac Surgery Insulin Drip Protocol  Note: Q1h bg checks.   Intervention: Chest tube to 20 cm suction, record CT drainage with VS  Note: CT monitored per protocol.   Intervention: For CT drainage > 300 cc in first post op hour and/or 150 cc in subsequent hours: platelets, coag screen, fibrinogen, H&H per order  Note: H/H monitored.  Output within limits.   Intervention: Titrate and wean off vasoactive drips per patient's condition and per MD order while maintaining SBP  mmHg per MD order  Note: Levophed titrated per parameters.  Off at this time.   Intervention: VAP protocol in place  Note: VAP in place while intubated.   Intervention: Wean from vent per protocol (see protocol), extubation goal with 2-6 hours post op.  Note: Pt weaned from vent at 1420.  Total intubation time 4:26  Intervention: IS q 1 hour while awake post extubation  Note: IS in use.  Best 1000 at this time.   Intervention: Bedrest until extubated and groin lines out  Note: Bedrest at this time.   Intervention: Clear liquids post extubation, advance as tolerated  Note: Clear liquids ordered.

## 2020-05-08 NOTE — PROGRESS NOTES
Pt became nauseated/attempted vomiting and vagal down to 30bpm with pacer kicking in.  Pt passed out for about 30secs.  He was confused when he woke up, but was able to answer pertinent questions within 5 mins.  NSR rate 74 at this time.  SBP 110s-120s.  Close observation maintained.

## 2020-05-08 NOTE — PROGRESS NOTES
Inpatient Anticoagulation Service Note    Date: 5/8/2020    Reason for Anticoagulation: Bioprosthetic Valve Replacement(AVR)   Target INR: 2.0 to 3.0         Hemoglobin Value: (!) 12.1  Hematocrit Value: (!) 36  Lab Platelet Value: 194    INR from last 7 days     Date/Time INR Value    05/08/20 0500  1.13    05/07/20 1057  (!) 1.28    05/06/20 1105  0.95        Dose from last 7 days     Date/Time Dose (mg)    05/08/20 1334  7.5        Average Dose (mg): (New start)  Significant Interactions: Aspirin, Statin, Allopurinol, Toradol prn  Bridge Therapy: No     Comments: Pt is POD 1 for AVR. Chest tubes to be dc'd. DDI with warfarin identified with ASA, allopurinol, and statin. Will give warfarin 7.5 mg today and the start warfarin 5 mg daily tomorrow. Will trend the INR.    Education Material Provided?: No  Pharmacist suggested discharge dosing: TBD, starting with warfarin 5 mg daily     Sharif Maradiaga, PharmD

## 2020-05-08 NOTE — PROGRESS NOTES
Critical Care Progress Note    Date of admission  5/7/2020    Chief Complaint  55 y.o. male admitted 5/7/2020 with severe symptomatic aortic stenosis    Hospital Course    55 y.o. 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 Markham.  Procedure was performed without complications and patient came off pump without difficulty.  He is brought to the intensive care unit on full mechanical ventilatory support, Precedex drip, insulin drip and NE gtt.  I was consulted for critical care management.      Interval Problem Update  Reviewed last 24 hour events:   - extubated per protocol   - remains on insulin gtt @ 2   - on cleviprex gtt for HTN control, lisinopril started   - NSR   - diuresing well with lasix   - A-line in place --> d/c   - coumadin today   - increased WBC, AF   - Na down to 132    Review of Systems  Review of Systems   Constitutional: Negative for chills and fever.   HENT: Negative for congestion and sore throat.    Eyes: Negative for blurred vision.   Respiratory: Positive for cough and sputum production. Negative for shortness of breath and wheezing.    Cardiovascular: Positive for chest pain. Negative for palpitations and leg swelling.   Gastrointestinal: Negative for abdominal pain, nausea and vomiting.   Genitourinary: Negative for dysuria.   Musculoskeletal: Negative for back pain.   Skin: Negative for rash.   Neurological: Negative for dizziness and headaches.   Endo/Heme/Allergies: Does not bruise/bleed easily.   Psychiatric/Behavioral: Negative for depression.   All other systems reviewed and are negative.       Vital Signs for last 24 hours   Temp:  [35.8 °C (96.4 °F)-37.3 °C (99.1 °F)] 37.3 °C (99.1 °F)  Pulse:  [57-97] 78  Resp:  [11-30] 14  BP: ()/(50-65)  101/59  SpO2:  [89 %-100 %] 95 %    Hemodynamic parameters for last 24 hours  CVP:  [4 MM HG-20 MM HG] 4 MM HG    Respiratory Information for the last 24 hours  Vent Mode: Spont  PEEP/CPAP: 8  P Support: 5  MAP: 11  Control VTE (exp VT): 528 --> extubated 5/7 to 4 lpm n/c, IS 1000/PEP    Physical Exam   Physical Exam  Vitals signs and nursing note reviewed.   Constitutional:       General: He is not in acute distress.     Appearance: He is normal weight. He is not diaphoretic.   HENT:      Head: Normocephalic and atraumatic.      Right Ear: External ear normal.      Left Ear: External ear normal.      Nose: Nose normal. No congestion.      Mouth/Throat:      Mouth: Mucous membranes are moist.      Pharynx: Oropharynx is clear. No oropharyngeal exudate.   Eyes:      Conjunctiva/sclera: Conjunctivae normal.      Pupils: Pupils are equal, round, and reactive to light.   Neck:      Musculoskeletal: Neck supple. No neck rigidity.   Cardiovascular:      Rate and Rhythm: Normal rate and regular rhythm. Occasional extrasystoles are present.     Chest Wall: PMI is not displaced.      Pulses: Normal pulses.      Heart sounds: Heart sounds are distant. Murmur present.      Comments: Sternotomy incision is clean/dry/intact, mediastinal chest tubes and epicardial pacing wires in place  Pulmonary:      Effort: No tachypnea or accessory muscle usage.      Breath sounds: No stridor. Examination of the right-lower field reveals rales. Examination of the left-lower field reveals rales. Rales present. No wheezing.      Comments: Speaking in full sentences, attempting to use I-S  Abdominal:      General: Bowel sounds are normal. There is no distension.      Palpations: Abdomen is soft.      Tenderness: There is no abdominal tenderness.   Genitourinary:     Comments: Murray catheter in place  Musculoskeletal:         General: No tenderness.      Right lower leg: No edema.      Left lower leg: No edema.   Skin:     General: Skin is warm  and dry.      Capillary Refill: Capillary refill takes less than 2 seconds.      Findings: No rash.   Neurological:      General: No focal deficit present.      Mental Status: He is alert and oriented to person, place, and time.      Cranial Nerves: No cranial nerve deficit.   Psychiatric:         Mood and Affect: Mood normal.         Medications  Current Facility-Administered Medications   Medication Dose Route Frequency Provider Last Rate Last Dose   • lisinopril (PRINIVIL) tablet 10 mg  10 mg Oral Q DAY Katarina Markham M.D.   10 mg at 05/08/20 0603   • insulin NPH (HUMULIN/NOVOLIN) injection 10 Units  10 Units Subcutaneous Q8HRS Katarina Markham M.D.        And   • insulin lispro (HUMALOG) injection 7 Units  7 Units Subcutaneous TID AC Katarina Markham M.D.       • insulin lispro (HUMALOG) injection 0-20 Units  0-20 Units Subcutaneous ACHS & 0200 Katarina Markham M.D.       • Respiratory Therapy Consult   Nebulization Continuous RT Dominique Narayanan A.P.N.       • NS infusion   Intravenous Continuous Dominique Narayanan A.P.N. 10 mL/hr at 05/07/20 1101     • calcium CHLORIDE 1,000 mg in D5W 100 mL IVPB  1,000 mg Intravenous Once PRN Dominique Narayanan A.P.N.       • Magnesium Sulfate in D5W IVPB premix 1 g  1 g Intravenous DAILY Dominique Narayanan A.P.N.   Stopped at 05/08/20 0625   • aspirin EC (ECOTRIN) tablet 81 mg  81 mg Oral DAILY Dominique Narayanan A.P.N.   81 mg at 05/08/20 0525   • clevidipine (CLEVIPREX) IV emulsion  1-21 mg/hr Intravenous Continuous Dominique Narayanan A.P.N. 16 mL/hr at 05/08/20 0651 8 mg/hr at 05/08/20 0651   • nitroglycerin 50 mg in D5W 250 ml infusion  0-100 mcg/min Intravenous Continuous Dominique Narayanan A.P.N.   Stopped at 05/07/20 1115   • Pharmacy Consult Request ...Pain Management Review 1 Each  1 Each Other PHARMACY TO DOSE Dominique Narayanan A.P.N.       • acetaminophen (TYLENOL) tablet 1,000 mg  1,000 mg Oral Q6HRS Dominique Narayanan, A.P.N.   1,000 mg at 05/08/20 0566   • gabapentin (NEURONTIN)  capsule 300 mg  300 mg Oral BID Dominique Narayanan A.P.N.   300 mg at 05/08/20 0525    Followed by   • [START ON 5/12/2020] gabapentin (NEURONTIN) capsule 100 mg  100 mg Oral BID Dominique Narayanan, A.P.N.       • oxyCODONE immediate-release (ROXICODONE) tablet 5 mg  5 mg Oral Q3HRS PRN Dominique Narayanan A.P.N.   5 mg at 05/07/20 1500   • oxyCODONE immediate release (ROXICODONE) tablet 10 mg  10 mg Oral Q3HRS PRN Dominique Narayanan, A.P.N.   10 mg at 05/08/20 0415   • midazolam (VERSED) 2 MG/2ML injection 2 mg  2 mg Intravenous Q HOUR PRN Dominique Narayanan, A.P.N.       • Dexmedetomidine HCl-Dextrose 200MCG/50ML -5% SOLN  0-1.5 mcg/kg/hr Intravenous Continuous Dominique Narayanan A.P.N.   Stopped at 05/07/20 1227   • sodium bicarbonate 8.4 % injection 50 mEq  50 mEq Intravenous Q HOUR PRN Dominique Narayanan, A.P.N.       • ondansetron (ZOFRAN) syringe/vial injection 8 mg  8 mg Intravenous Q6HRS PRN Dominique Narayanan, A.P.N.   4 mg at 05/07/20 1827    Or   • prochlorperazine (COMPAZINE) injection 10 mg  10 mg Intravenous Q6HRS PRN Dominique Narayanan, A.P.N.        Or   • promethazine (PHENERGAN) suppository 25 mg  25 mg Rectal Q6HRS PRN Dominique Narayanan, A.P.N.       • acetaminophen (TYLENOL) tablet 650 mg  650 mg Oral Q4HRS PRN Dominique Narayanan, A.P.N.        Or   • acetaminophen (TYLENOL) suppository 650 mg  650 mg Rectal Q4HRS PRN Dominique Narayanan, A.P.N.       • senna-docusate (PERICOLACE or SENOKOT S) 8.6-50 MG per tablet 2 Tab  2 Tab Oral BID Dominique Narayanan A.P.N.   2 Tab at 05/08/20 0525    And   • polyethylene glycol/lytes (MIRALAX) PACKET 1 Packet  1 Packet Oral DAILY Dominique Narayanan, A.P.N.        And   • [START ON 5/9/2020] magnesium hydroxide (MILK OF MAGNESIA) suspension 30 mL  30 mL Oral DAILY Dominique Narayanan, A.P.N.        And   • bisacodyl (DULCOLAX) suppository 10 mg  10 mg Rectal QDAY PRN Dominique Narayanan, A.P.N.       • mag hydrox-al hydrox-simeth (MAALOX PLUS ES or MYLANTA DS) suspension 30 mL  30 mL Oral Q4HRS PRN  ESE Holliday.P.N.       • diphenhydrAMINE (BENADRYL) tablet/capsule 25 mg  25 mg Oral HS PRN - MR X 1 AFIA HollidayP.N.       • electrolyte-A (PLASMALYTE-A) infusion   Intravenous PRN ESE Holliday.P.N.   1,000 mL at 05/07/20 1242   • MD Alert...Warfarin per Pharmacy   Other PHARMACY TO DOSE SEE Holliday.P.N.       • fentaNYL (SUBLIMAZE) injection 50 mcg  50 mcg Intravenous Q3HRS PRN ESE Holliday.P.N.   50 mcg at 05/07/20 2306   • insulin regular human (HUMULIN/NOVOLIN R) 62.5 Units in  mL infusion per protocol  1-6 Units/hr Intravenous Continuous ESE Holliday.P.NIva 8 mL/hr at 05/08/20 0615 2 Units/hr at 05/08/20 0615    And   • insulin regular (HUMULIN R) injection 0-14 Units  0-14 Units Intravenous Once ESE Holliday.P.N.   Stopped at 05/07/20 1230    And   • insulin regular (HUMULIN R) injection 0-10 Units  0-10 Units Intravenous PRN AFIA HollidayP.N.   1 Units at 05/07/20 1706    And   • dextrose 50% (D50W) injection 50 mL  50 mL Intravenous PRN ESE Holliday.P.N.       • insulin lispro (HUMALOG) injection 0-20 Units  0-20 Units Subcutaneous PRN ESE Holliday.P.N.       • EPINEPHrine (Adrenalin) infusion 4 mg/250 mL (premix)  0-0.2 mcg/kg/min Intravenous Continuous Katarina Markham M.D.       • norepinephrine (Levophed) infusion 8 mg/250 mL (premix)  0-30 mcg/min Intravenous Continuous Katarina Markham M.D.   Stopped at 05/07/20 1646       Fluids    Intake/Output Summary (Last 24 hours) at 5/8/2020 0719  Last data filed at 5/8/2020 0500  Gross per 24 hour   Intake 3387.96 ml   Output 2130 ml   Net 1257.96 ml       Laboratory  Recent Labs     05/07/20  1059 05/07/20  1156 05/07/20  1322   ISTATAPH 7.303* 7.371* 7.346*   ISTATAPCO2 42.5* 35.0 34.8   ISTATAPO2 123* 136* 130*   ISTATATCO2 22 21 20   NJDNIQE3EPT 98 99 99   ISTATARTHCO3 21.1 20.3 19.0   ISTATARTBE -5* -4 -6*   ISTATTEMP 97.0 F 97.0 F 96.0 F   ISTATFIO2 80 80 50   ISTATSPEC Arterial Arterial  Arterial   ISTATAPHTC 7.316* 7.384* 7.366*   AFCPEEHQ0PB 117* 130* 121*         Recent Labs     05/06/20  1105 05/07/20 1057 05/07/20  1707 05/07/20  2300 05/08/20  0500   SODIUM 135  --   --   --  132*   POTASSIUM 5.4 6.0* 4.5 5.4 4.9   CHLORIDE 102  --   --   --  102   CO2 24  --   --   --  19*   BUN 20  --   --   --  20   CREATININE 0.70  --   --   --  0.75   MAGNESIUM  --  2.4  --   --   --    CALCIUM 9.8  --   --   --  8.6     Recent Labs     05/06/20 1105 05/08/20  0500   ALTSGPT 30  --    ASTSGOT 26  --    ALKPHOSPHAT 85  --    TBILIRUBIN 0.4  --    GLUCOSE 106* 124*     Recent Labs     05/06/20 1105 05/08/20  0500   WBC 8.5 18.2*   NEUTSPOLYS 56.00  --    LYMPHOCYTES 27.20  --    MONOCYTES 10.00  --    EOSINOPHILS 5.70  --    BASOPHILS 0.90  --    ASTSGOT 26  --    ALTSGPT 30  --    ALKPHOSPHAT 85  --    TBILIRUBIN 0.4  --      Recent Labs     05/06/20 1105 05/07/20  1057 05/08/20  0500   RBC 4.24*  --  3.79*   HEMOGLOBIN 13.5*  --  12.1*   HEMATOCRIT 40.2*  --  36.0*   PLATELETCT 255 177 194   PROTHROMBTM 12.9 16.4* 14.8*   APTT 30.2 32.1  --    INR 0.95 1.28* 1.13       Imaging  X-Ray:  I have personally reviewed the images and compared with prior images. and My impression is: Improving edema with enlarged cardiac silhouette, retrocardiac atelectasis, sternotomy wires, right IJ central venous catheter in good position    Assessment/Plan  * Aortic stenosis, severe- (present on admission)  Assessment & Plan  Status post aortic valve replacement 5/7 with Dr. Markham  Continue routine postoperative management  Discontinue chest tubes  Epicardial pacing wires  Strict blood pressure control titrating Cleviprex drip, lisinopril added  Continue to titrate insulin drip for strict glycemic control  PRN analgesics  Aspirin --> Coumadin start tonight    Encounter for weaning from ventilator (HCC)  Assessment & Plan  Intubated and extubated 5/7 per protocol  Encourage incentive spirometry/PEP  Titrate FiO2 to goal  SaO2 greater than 92%  RT/O2 protocol  Diuresis  mobilization    History of SBE (subacute bacterial endocarditis)- (present on admission)  Assessment & Plan  Did not require surgical intervention status post appropriate IV antibiotic course  Monitor    Hypertension- (present on admission)  Assessment & Plan  Titrate Cleviprex drip for goal SBP less than 140  Begin lisinopril  IV PRN labetalol/hydralazine    Hyperkalemia  Assessment & Plan  Likely post pump related -resolved  Monitor    Acute blood loss as cause of postoperative anemia  Assessment & Plan  Daily CBC with conservative transfusion strategy       VTE:  Coumadin  Ulcer: Not Indicated  Lines: Central Line  Ongoing indication addressed and Murray Catheter  To be removed today    I have performed a physical exam and reviewed and updated ROS and Plan today (5/8/2020). In review of yesterday's note (5/7/2020), there are no changes except as documented above.     Patient remains critically ill today required my active titration of his insulin drip for tight glycemic control as well as Cleviprex drip for hypertension control. High risk of deterioration and worsening vital organ dysfunction and death without the above critical care interventions.    Discussed patient condition and risk of morbidity and/or mortality with RN, RT, Pharmacy, , Charge nurse / hot rounds, Patient and CVS  The patient remains critically ill.  Critical care time = 34 minutes in directly providing and coordinating critical care and extensive data review.  No time overlap and excludes procedures.

## 2020-05-08 NOTE — ANESTHESIA POSTPROCEDURE EVALUATION
Patient: Sanjeev Malagon    Procedure Summary     Date:  05/07/20 Room / Location:  [facility] OR 02 / SURGERY [facility]    Anesthesia Start:  0730 Anesthesia Stop:  1110    Procedures:       REPLACEMENT, AORTIC VALVE (N/A Chest)      ECHOCARDIOGRAM, TRANSESOPHAGEAL (N/A Mouth) Diagnosis:       Mitral regurgitation      Aortic stenosis      Endocarditis      (mitral regurgitation aortic stenosis endocarditis )    Surgeon:  Katarina Markham M.D. Responsible Provider:  Robbin Call M.D.    Anesthesia Type:  general ASA Status:  4          Final Anesthesia Type: general  Last vitals  BP   Blood Pressure: (!) 89/53, NIBP: 128/54(left arm), Arterial BP: (!) 98/47    Temp   37.3 °C (99.1 °F)    Pulse   Pulse: 78   Resp   14    SpO2   99 %      Anesthesia Post Evaluation    Patient location during evaluation: PACU  Patient participation: complete - patient participated  Level of consciousness: awake and alert  Pain score: 4    Airway patency: patent  Anesthetic complications: no  Cardiovascular status: hemodynamically stable  Respiratory status: acceptable  Hydration status: euvolemic    PONV: none           Nurse Pain Score: 4 (NPRS)

## 2020-05-08 NOTE — PROGRESS NOTES
Cardiac Surgery RN Navigator Daily Rounding Note  Procedure Performed: Procedure(s) (LRB):  REPLACEMENT, AORTIC VALVE (N/A)  ECHOCARDIOGRAM, TRANSESOPHAGEAL (N/A)     By  Dr. Markham  1 Day Post-Op    Pertinent Overnight Events:    Patient was extubated successfully yesterday    One episode of n/v with vagal and loss of consciousness-patient recovered with no residual issues neurologically or hemodynamically    Remains on cleviprex-first am dose of lisinopril given.    Slightly lower UOP this am-given 20 one time dose of lasix    Neuro:  A&O: yes  Pain control needs: Oxy 10 mg Q 3 hours  Mobility: up to chair with 1-2 person assist    PT last eval date: no  rehab or SNF recommended: unknown    OT last eval date: no  Rehab or SNF recommended: unknown    Cardiac/hemodynamics:  Temp (24hrs), Av.4 °C (97.6 °F), Min:35.8 °C (96.4 °F), Max:37.3 °C (99.1 °F)    Heart rhythm: NSR  Temp:  [35.8 °C (96.4 °F)-37.3 °C (99.1 °F)] 37.3 °C (99.1 °F)  Pulse:  [57-97] 78  Resp:  [11-30] 14  BP: ()/(50-65) 101/59  SpO2:  [89 %-100 %] 95 %  CVP:  [4 MM HG-20 MM HG] 4 MM HG   IV gtts: NS, Last Rate: 10 mL/hr at 20 1101  clevidipine, Last Rate: 8 mg/hr (20 0651)  nitroglycerin in D5W, Last Rate: Stopped (20 1115)  dexmedetomidine (PRECEDEX) infusion, Last Rate: Stopped (20 1227)  insulin infusion for post-cardiac surgery protocol, Last Rate: 2 Units/hr (20 0615)  EPINEPHrine (Adrenalin) infusion  norepinephrine (Levophed) infusion, Last Rate: Stopped (20 1646)      12-hour chest tube output: 160 cc    /Weights:  Admit weight: Weight: 88 kg (194 lb)  Current Weight: Weight: 91.7 kg (202 lb 2.6 oz) (20 0600)  Weight change: 3.702 kg (8 lb 2.6 oz)  Weight Up    Intake/Output Summary (Last 24 hours) at 2020 0741  Last data filed at 2020 0500  Gross per 24 hour   Intake 3387.96 ml   Output 2130 ml   Net 1257.96 ml     12-hour urine output 520 cc  Since admit net I/O is 1.2  liters    Respiratory:  Vent Mode: Spont  PEEP/CPAP: 8  P Support: 5  MAP: 11  Control VTE (exp VT): 528     O2 needs: 4 L NC  recent CXR: hypoinflation; bibasilar atelectasis  Incentive spirometry compliance: yes  POD #3  2 view CXR needed? no    GI:  Bowel movement: no  Last BM: PTA    Labs:  Recent Labs     05/06/20  1105 05/07/20  1057 05/08/20  0500   WBC 8.5  --  18.2*   RBC 4.24*  --  3.79*   HEMOGLOBIN 13.5*  --  12.1*   HEMATOCRIT 40.2*  --  36.0*   MCV 94.8  --  95.0   MCH 31.8  --  31.9   MCHC 33.6*  --  33.6*   RDW 44.8  --  45.7   PLATELETCT 255 177 194   MPV 10.4  --  10.4     Recent Labs     05/06/20  1105  05/07/20  1707 05/07/20  2300 05/08/20  0500   SODIUM 135  --   --   --  132*   POTASSIUM 5.4   < > 4.5 5.4 4.9   CHLORIDE 102  --   --   --  102   CO2 24  --   --   --  19*   GLUCOSE 106*  --   --   --  124*   BUN 20  --   --   --  20   CREATININE 0.70  --   --   --  0.75   CALCIUM 9.8  --   --   --  8.6    < > = values in this interval not displayed.     INR:   Recent Labs     05/06/20  1105 05/07/20  1057 05/08/20  0500   INR 0.95 1.28* 1.13       Medications:  ASA:  Yes  Plavix: No; contraindicated because of Other isolated AVR  BB: No; contraindicated because of High risk for heart block  Statin: no-needs home dose  ACE/ARB: Yes  Lasix: no    LDA's:  Central Line: yes  Murray: yes  Chest tubes: yes  Pacer Wires: yes  Staples: no    Team's Goal for patient:    Consider restarting home atorvastatin 10 mg?    Start daily lasix?    Patient maxed on high dose Oxy-may need additional pain management medication-GIANNI to evaluate    Discharge plan:    TBD

## 2020-05-08 NOTE — CARE PLAN
Problem: Hyperinflation:  Goal: Prevent or improve atelectasis  Outcome: PROGRESSING AS EXPECTED   Pep qid, 1 lpm nc, 6647-9812 cc on IS

## 2020-05-08 NOTE — PROGRESS NOTES
Cardiovascular Surgery Progress Note    Name: Sanjeev Malagon  MRN: [id number]  : 1965  Admit Date: 2020  5:27 AM  Procedure:  Procedure(s) and Anesthesia Type:     * REPLACEMENT, AORTIC VALVE - General     * ECHOCARDIOGRAM, TRANSESOPHAGEAL - General  1 Day Post-Op    Vitals:  Patient Vitals for the past 8 hrs:   Temp Monitored Temp 2 SpO2 O2 Delivery Device O2 (LPM) Pulse Resp BP Weight   20 0800 37.3 °C (99.1 °F) 37.3 °C (99.1 °F) 92 % Silicone Nasal Cannula 4 78 (!) 29 101/57 --   20 0745 37.3 °C (99.1 °F) 37.3 °C (99.1 °F) 92 % Silicone Nasal Cannula 4 81 (!) 29 103/59 --   20 0730 -- 37.3 °C (99.1 °F) 95 % -- -- 78 20 -- --   20 0715 -- 37.3 °C (99.1 °F) 94 % -- -- 77 15 -- --   20 0700 -- 37.2 °C (99 °F) 95 % -- -- 79 (!) 21 103/55 --   20 0645 -- 37.2 °C (99 °F) 93 % -- -- 84 (!) 30 -- --   20 0630 -- 37.3 °C (99.1 °F) 96 % -- -- 79 14 103/62 --   20 0615 -- 37.4 °C (99.3 °F) 95 % -- -- 79 (!) 7 -- --   20 0600 -- 37.4 °C (99.3 °F) 95 % Silicone Nasal Cannula 6 78 14 101/59 91.7 kg (202 lb 2.6 oz)   20 0545 -- 37.4 °C (99.3 °F) 95 % -- -- 80 (!) 10 108/58 --   20 0530 -- 37.4 °C (99.3 °F) 91 % -- -- 80 (!) 44 -- --   20 0515 -- 37.4 °C (99.3 °F) 92 % -- -- 85 17 112/59 --   20 0500 -- 37 °C (98.6 °F) 89 % Silicone Nasal Cannula 6 97 (!) 11 116/58 --   20 0445 -- 37.4 °C (99.3 °F) 90 % -- -- 80 (!) 24 -- --   20 0430 -- 37.3 °C (99.1 °F) 94 % -- -- 81 17 101/59 --   20 0415 -- 37.3 °C (99.1 °F) 93 % -- -- 81 (!) 30 -- --   20 0400 37.3 °C (99.1 °F) 37.3 °C (99.1 °F) 94 % Silicone Nasal Cannula 5 82 19 100/57 --   20 0300 -- 37.2 °C (99 °F) 94 % Silicone Nasal Cannula 5 82 (!) 22 (!) 98/57 --   20 0200 -- 37.2 °C (99 °F) 94 % Silicone Nasal Cannula 5 83 19 (!) 96/57 --   20 0109 -- 37 °C (98.6 °F) 92 % Silicone Nasal Cannula 5 83 (!) 24 -- --   20 0100 -- 37 °C (98.6  °F) 93 % Silicone Nasal Cannula 5 82 12 (!) 92/58 --     Temp (24hrs), Av.7 °C (98 °F), Min:35.8 °C (96.4 °F), Max:37.3 °C (99.1 °F)      Respiratory:  Vent Mode: Spont, PEEP/CPAP: 8, PIP: 17, MAP: 11 Respiration: (!) 29, Pulse Oximetry: 92 %     Chest Tube Drains:          Fluids:    Intake/Output Summary (Last 24 hours) at 2020 0816  Last data filed at 2020 0800  Gross per 24 hour   Intake 3457.83 ml   Output 2130 ml   Net 1327.83 ml     Admit weight: Weight: 88 kg (194 lb)  Current weight: Weight: 91.7 kg (202 lb 2.6 oz) (20 0600)    Labs:  Recent Labs     20  1057 20  0500   WBC 8.5  --  18.2*   RBC 4.24*  --  3.79*   HEMOGLOBIN 13.5*  --  12.1*   HEMATOCRIT 40.2*  --  36.0*   MCV 94.8  --  95.0   MCH 31.8  --  31.9   MCHC 33.6*  --  33.6*   RDW 44.8  --  45.7   PLATELETCT 255 177 194   MPV 10.4  --  10.4     Recent Labs     20  110   NEUTSPOLYS 56.00   LYMPHOCYTES 27.20   MONOCYTES 10.00   EOSINOPHILS 5.70   BASOPHILS 0.90     Recent Labs     20  1105  20  1707 20  2300 20  0500   SODIUM 135  --   --   --  132*   POTASSIUM 5.4   < > 4.5 5.4 4.9   CHLORIDE 102  --   --   --  102   CO2 24  --   --   --  19*   GLUCOSE 106*  --   --   --  124*   BUN 20  --   --   --  20   CREATININE 0.70  --   --   --  0.75   CALCIUM 9.8  --   --   --  8.6    < > = values in this interval not displayed.     Recent Labs     20  11020  1057 20  0500   APTT 30.2 32.1  --    INR 0.95 1.28* 1.13       Medications:  • insulin NPH  10 Units      And   • insulin lispro  7 Units     • insulin lispro  0-20 Units     • atorvastatin  5 mg     • allopurinol  200 mg     • [START ON 2020] lisinopril  10 mg     • potassium chloride SA  20 mEq     • furosemide  40 mg     • oxyCODONE CR  10 mg     • magnesium sulfate  1 g Stopped (20 0625)   • aspirin EC  81 mg     • Pharmacy Consult Request  1 Each     • acetaminophen  1,000 mg     • gabapentin   300 mg      Followed by   • [START ON 5/12/2020] gabapentin  100 mg     • senna-docusate  2 Tab      And   • polyethylene glycol/lytes  1 Packet      And   • [START ON 5/9/2020] magnesium hydroxide  30 mL     • MD Alert...Warfarin per Pharmacy       • insulin regular  0-14 Units          Ordered Medications:    ASA - Yes    Plavix - No; contraindicated because of Other aortic valve    Post-operative Beta Blockers - No; contraindicated because of High risk for heart block    Ace Inhibitor - Yes    Statin - Yes        Exam:   Review of Systems   Constitutional: Positive for malaise/fatigue.   HENT: Negative.    Eyes: Negative.    Respiratory: Negative.    Cardiovascular: Negative.    Gastrointestinal: Negative.    Genitourinary: Negative.    Musculoskeletal: Positive for myalgias.   Skin: Negative.    Neurological: Negative.    Endo/Heme/Allergies: Negative.    Psychiatric/Behavioral: Negative.        Physical Exam  Constitutional:       General: He is not in acute distress.     Appearance: He is well-developed. He is not diaphoretic.   Neck:      Musculoskeletal: Neck supple.   Cardiovascular:      Rate and Rhythm: Normal rate and regular rhythm.      Heart sounds: Normal heart sounds. No murmur. No friction rub. No gallop.    Pulmonary:      Effort: Pulmonary effort is normal. No respiratory distress.      Breath sounds: Examination of the right-lower field reveals decreased breath sounds. Examination of the left-lower field reveals decreased breath sounds. Decreased breath sounds present. No wheezing or rales.   Abdominal:      General: There is no distension.      Palpations: Abdomen is soft.      Tenderness: There is no abdominal tenderness.   Musculoskeletal:      Right lower leg: Edema present.      Left lower leg: Edema present.   Skin:     General: Skin is warm and dry.   Neurological:      Mental Status: He is alert and oriented to person, place, and time.   Psychiatric:         Mood and Affect: Mood  normal.         Behavior: Behavior normal.         Thought Content: Thought content normal.         Judgment: Judgment normal.       Quality Measures:   Quality-Core Measures   Reviewed items::  EKG reviewed, Labs reviewed, Medications reviewed and Radiology images reviewed    Assessment/Plan:  POD 1 HTN- on cleviprex- starting home lisinopril, diurese, d/c mediastinal tubes, d/c dixon, pain - add toradol/oxycontin, transfer after off cleviprex    Active Hospital Problems    Diagnosis   • Encounter for weaning from ventilator (HCC) [Z99.11]     Priority: High   • Aortic stenosis, severe [I35.0]     Priority: High   • History of SBE (subacute bacterial endocarditis) [Z86.79]     Priority: Medium   • Hypertension [I10]     Priority: Medium   • Acute blood loss as cause of postoperative anemia [D62]     Priority: Low   • Hyperkalemia [E87.5]     Priority: Low

## 2020-05-08 NOTE — CARE PLAN
Problem: Day of surgery post CABG/Heart valve replacement  Goal: Stabilization in immediate post op period  Intervention: VS q 15 min x 4 hours, then q 1 hour. Include temperature immediately upon arrival. Check CO/CI q 2-4 hours and PRN  Note: Pt vitals filed q1 this shift. No PA cath present.   Intervention: If radial artery used, elevate arm, no BP checks or needle sticks from affected arm, monitor ulnar pulse and capillary refill  Note: Pt arm elevated on pillow support, cap refill less than three, ulnar pulse present.   Intervention: First post op hour labs and diagnostics per MD order  Note: Completed by AM shift.   Intervention: Serum K q 6 hours x 24 hours.  ABG and CBC prn.  Note: K checked q6 per order.   Intervention: For FSBS greater than 130, start Post Cardiac Surgery Insulin Drip Protocol  Note: Pt remains on insulin gtt.   Intervention: FSBS frequency as per Cardiac Surgery Insulin Drip Protocol  Note: Following per protocol.   Intervention: For patients on Beta Blockers: verify dose given prior to surgery or within 6 hours after arrival to the unit  Note: Pt BB not ordered.   Intervention: Chest tube to 20 cm suction, record CT drainage with VS  Note: Completed per policy.   Intervention: For CT drainage > 300 cc in first post op hour and/or 150 cc in subsequent hours: platelets, coag screen, fibrinogen, H&H per order  Note: Pt cc drainage less than 150/ hr  Intervention: Titrate and wean off vasoactive drips per patient's condition and per MD order while maintaining SBP  mmHg per MD order  Note: Pt remains on lopressor   Intervention: VAP protocol in place  Note: Pulm toilet complete when awake.   Intervention: Wean from vent per protocol (see protocol), extubation goal with 2-6 hours post op.  Note: Pt extubated.   Intervention: IS q 1 hour while awake post extubation  Note: PT using IS when awake reaching 1k.   Intervention: Bedrest until extubated and groin lines out  Note: Pt has  mobilized on edge of bed.   Intervention: Out of bed, dangle 4 hours post extubation  Note: Pt dangled per protocol.   Intervention: Up in chair 4 hours, day of extubation  Note: Pt will mobilize to chair this AM.   Intervention: Maintain all original surgical dressings for 24 hours  Note: Dressings maintained.   Intervention: Clear liquids post extubation, advance as tolerated  Note: Will place diet order as appropriate.   Intervention: Discontinue Evans gabriella and arterial line 12-18 hours post op if hemodynamically stable and off vasoactive drips  Note: PA NA. Pt remains on lopressor.   Intervention: A-Fib and DVT prophylaxis per MD order or contraindications documented (refer to DVT/VTE problem on Care Plan)  Note: Completed per order.   Intervention: Amiodarone protocol per MD order  Note: NA.

## 2020-05-09 LAB
AMORPH CRY #/AREA URNS HPF: PRESENT /HPF
ANION GAP SERPL CALC-SCNC: 10 MMOL/L (ref 7–16)
ANION GAP SERPL CALC-SCNC: 11 MMOL/L (ref 7–16)
APPEARANCE UR: ABNORMAL
BACTERIA #/AREA URNS HPF: ABNORMAL /HPF
BILIRUB UR QL STRIP.AUTO: NEGATIVE
BUN SERPL-MCNC: 37 MG/DL (ref 8–22)
BUN SERPL-MCNC: 42 MG/DL (ref 8–22)
CALCIUM SERPL-MCNC: 8.1 MG/DL (ref 8.5–10.5)
CALCIUM SERPL-MCNC: 8.2 MG/DL (ref 8.5–10.5)
CAOX CRY #/AREA URNS HPF: ABNORMAL /HPF
CHLORIDE SERPL-SCNC: 95 MMOL/L (ref 96–112)
CHLORIDE SERPL-SCNC: 98 MMOL/L (ref 96–112)
CO2 SERPL-SCNC: 21 MMOL/L (ref 20–33)
CO2 SERPL-SCNC: 22 MMOL/L (ref 20–33)
COLOR UR: ABNORMAL
CREAT SERPL-MCNC: 3.36 MG/DL (ref 0.5–1.4)
CREAT SERPL-MCNC: 4.17 MG/DL (ref 0.5–1.4)
CREAT UR-MCNC: 334.26 MG/DL
CREAT UR-MCNC: 336.76 MG/DL
EPI CELLS #/AREA URNS HPF: ABNORMAL /HPF
ERYTHROCYTE [DISTWIDTH] IN BLOOD BY AUTOMATED COUNT: 49.1 FL (ref 35.9–50)
GLUCOSE BLD-MCNC: 100 MG/DL (ref 65–99)
GLUCOSE BLD-MCNC: 100 MG/DL (ref 65–99)
GLUCOSE BLD-MCNC: 96 MG/DL (ref 65–99)
GLUCOSE SERPL-MCNC: 105 MG/DL (ref 65–99)
GLUCOSE SERPL-MCNC: 99 MG/DL (ref 65–99)
GLUCOSE UR STRIP.AUTO-MCNC: NEGATIVE MG/DL
GRAN CASTS #/AREA URNS LPF: ABNORMAL /LPF
HCT VFR BLD AUTO: 30.4 % (ref 42–52)
HGB BLD-MCNC: 9.6 G/DL (ref 14–18)
INR PPP: 1.13 (ref 0.87–1.13)
KETONES UR STRIP.AUTO-MCNC: ABNORMAL MG/DL
LEUKOCYTE ESTERASE UR QL STRIP.AUTO: ABNORMAL
LV EJECT FRACT  99904: 55
MCH RBC QN AUTO: 31.5 PG (ref 27–33)
MCHC RBC AUTO-ENTMCNC: 31.6 G/DL (ref 33.7–35.3)
MCV RBC AUTO: 99.7 FL (ref 81.4–97.8)
MICRO URNS: ABNORMAL
MICROALBUMIN UR-MCNC: 30.9 MG/DL
MICROALBUMIN/CREAT UR: 92 MG/G (ref 0–30)
NITRITE UR QL STRIP.AUTO: NEGATIVE
PH UR STRIP.AUTO: 5 [PH] (ref 5–8)
PLATELET # BLD AUTO: 139 K/UL (ref 164–446)
PMV BLD AUTO: 10.3 FL (ref 9–12.9)
POTASSIUM SERPL-SCNC: 5.2 MMOL/L (ref 3.6–5.5)
POTASSIUM SERPL-SCNC: 5.7 MMOL/L (ref 3.6–5.5)
PROT UR QL STRIP: 100 MG/DL
PROT UR-MCNC: 98 MG/DL (ref 0–15)
PROT/CREAT UR: 291 MG/G (ref 15–68)
PROTHROMBIN TIME: 14.8 SEC (ref 12–14.6)
RBC # BLD AUTO: 3.05 M/UL (ref 4.7–6.1)
RBC # URNS HPF: >150 /HPF
RBC UR QL AUTO: ABNORMAL
SODIUM SERPL-SCNC: 126 MMOL/L (ref 135–145)
SODIUM SERPL-SCNC: 131 MMOL/L (ref 135–145)
SP GR UR STRIP.AUTO: 1.02
UROBILINOGEN UR STRIP.AUTO-MCNC: 0.2 MG/DL
WBC # BLD AUTO: 17.2 K/UL (ref 4.8–10.8)
WBC #/AREA URNS HPF: ABNORMAL /HPF

## 2020-05-09 PROCEDURE — 94669 MECHANICAL CHEST WALL OSCILL: CPT

## 2020-05-09 PROCEDURE — 80048 BASIC METABOLIC PNL TOTAL CA: CPT | Mod: 91

## 2020-05-09 PROCEDURE — 770020 HCHG ROOM/CARE - TELE (206)

## 2020-05-09 PROCEDURE — 84156 ASSAY OF PROTEIN URINE: CPT

## 2020-05-09 PROCEDURE — 700102 HCHG RX REV CODE 250 W/ 637 OVERRIDE(OP): Performed by: NURSE PRACTITIONER

## 2020-05-09 PROCEDURE — 82570 ASSAY OF URINE CREATININE: CPT | Mod: 91

## 2020-05-09 PROCEDURE — 51798 US URINE CAPACITY MEASURE: CPT

## 2020-05-09 PROCEDURE — 85610 PROTHROMBIN TIME: CPT

## 2020-05-09 PROCEDURE — 700105 HCHG RX REV CODE 258: Performed by: NURSE PRACTITIONER

## 2020-05-09 PROCEDURE — 99024 POSTOP FOLLOW-UP VISIT: CPT | Performed by: THORACIC SURGERY (CARDIOTHORACIC VASCULAR SURGERY)

## 2020-05-09 PROCEDURE — 81001 URINALYSIS AUTO W/SCOPE: CPT

## 2020-05-09 PROCEDURE — 700102 HCHG RX REV CODE 250 W/ 637 OVERRIDE(OP): Performed by: THORACIC SURGERY (CARDIOTHORACIC VASCULAR SURGERY)

## 2020-05-09 PROCEDURE — A9270 NON-COVERED ITEM OR SERVICE: HCPCS | Performed by: NURSE PRACTITIONER

## 2020-05-09 PROCEDURE — 700111 HCHG RX REV CODE 636 W/ 250 OVERRIDE (IP): Performed by: NURSE PRACTITIONER

## 2020-05-09 PROCEDURE — 82043 UR ALBUMIN QUANTITATIVE: CPT

## 2020-05-09 PROCEDURE — A9270 NON-COVERED ITEM OR SERVICE: HCPCS | Performed by: THORACIC SURGERY (CARDIOTHORACIC VASCULAR SURGERY)

## 2020-05-09 PROCEDURE — 82962 GLUCOSE BLOOD TEST: CPT | Mod: 91

## 2020-05-09 PROCEDURE — 85027 COMPLETE CBC AUTOMATED: CPT

## 2020-05-09 RX ORDER — GABAPENTIN 300 MG/1
300 CAPSULE ORAL DAILY
Status: DISPENSED | OUTPATIENT
Start: 2020-05-10 | End: 2020-05-12

## 2020-05-09 RX ORDER — GABAPENTIN 100 MG/1
100 CAPSULE ORAL DAILY
Status: DISCONTINUED | OUTPATIENT
Start: 2020-05-12 | End: 2020-05-11

## 2020-05-09 RX ORDER — SODIUM CHLORIDE 9 MG/ML
INJECTION, SOLUTION INTRAVENOUS CONTINUOUS
Status: DISCONTINUED | OUTPATIENT
Start: 2020-05-09 | End: 2020-05-10

## 2020-05-09 RX ORDER — SODIUM CHLORIDE 9 MG/ML
500 INJECTION, SOLUTION INTRAVENOUS ONCE
Status: COMPLETED | OUTPATIENT
Start: 2020-05-09 | End: 2020-05-09

## 2020-05-09 RX ORDER — FUROSEMIDE 10 MG/ML
80 INJECTION INTRAMUSCULAR; INTRAVENOUS ONCE
Status: COMPLETED | OUTPATIENT
Start: 2020-05-09 | End: 2020-05-09

## 2020-05-09 RX ORDER — SODIUM CHLORIDE 9 MG/ML
INJECTION, SOLUTION INTRAVENOUS
Status: COMPLETED
Start: 2020-05-09 | End: 2020-05-09

## 2020-05-09 RX ORDER — SODIUM CHLORIDE 9 MG/ML
INJECTION, SOLUTION INTRAVENOUS
Status: ACTIVE
Start: 2020-05-09 | End: 2020-05-09

## 2020-05-09 RX ADMIN — LISINOPRIL 5 MG: 5 TABLET ORAL at 05:31

## 2020-05-09 RX ADMIN — SODIUM CHLORIDE 500 ML: 9 INJECTION, SOLUTION INTRAVENOUS at 10:04

## 2020-05-09 RX ADMIN — ATORVASTATIN CALCIUM 5 MG: 10 TABLET, FILM COATED ORAL at 05:31

## 2020-05-09 RX ADMIN — MAGNESIUM HYDROXIDE 30 ML: 400 SUSPENSION ORAL at 08:57

## 2020-05-09 RX ADMIN — GABAPENTIN 300 MG: 300 CAPSULE ORAL at 05:31

## 2020-05-09 RX ADMIN — ACETAMINOPHEN 1000 MG: 500 TABLET ORAL at 18:40

## 2020-05-09 RX ADMIN — SENNOSIDES AND DOCUSATE SODIUM 2 TABLET: 8.6; 5 TABLET ORAL at 05:31

## 2020-05-09 RX ADMIN — POLYETHYLENE GLYCOL 3350 1 PACKET: 17 POWDER, FOR SOLUTION ORAL at 05:30

## 2020-05-09 RX ADMIN — SENNOSIDES AND DOCUSATE SODIUM 2 TABLET: 8.6; 5 TABLET ORAL at 18:40

## 2020-05-09 RX ADMIN — SODIUM CHLORIDE 500 ML: 9 INJECTION, SOLUTION INTRAVENOUS at 14:02

## 2020-05-09 RX ADMIN — ALLOPURINOL 200 MG: 100 TABLET ORAL at 18:39

## 2020-05-09 RX ADMIN — ALLOPURINOL 200 MG: 100 TABLET ORAL at 05:31

## 2020-05-09 RX ADMIN — ACETAMINOPHEN 1000 MG: 500 TABLET ORAL at 05:40

## 2020-05-09 RX ADMIN — ASPIRIN 81 MG: 81 TABLET, COATED ORAL at 05:30

## 2020-05-09 RX ADMIN — POTASSIUM CHLORIDE 20 MEQ: 1500 TABLET, EXTENDED RELEASE ORAL at 05:30

## 2020-05-09 RX ADMIN — SODIUM CHLORIDE: 9 INJECTION, SOLUTION INTRAVENOUS at 18:33

## 2020-05-09 RX ADMIN — ACETAMINOPHEN 1000 MG: 500 TABLET ORAL at 12:40

## 2020-05-09 RX ADMIN — ACETAMINOPHEN 1000 MG: 500 TABLET ORAL at 23:29

## 2020-05-09 RX ADMIN — OXYCODONE HYDROCHLORIDE 10 MG: 10 TABLET, FILM COATED, EXTENDED RELEASE ORAL at 05:31

## 2020-05-09 RX ADMIN — INSULIN HUMAN 10 UNITS: 100 INJECTION, SUSPENSION SUBCUTANEOUS at 06:17

## 2020-05-09 RX ADMIN — WARFARIN SODIUM 5 MG: 5 TABLET ORAL at 20:18

## 2020-05-09 RX ADMIN — FUROSEMIDE 80 MG: 10 INJECTION, SOLUTION INTRAMUSCULAR; INTRAVENOUS at 03:44

## 2020-05-09 ASSESSMENT — LIFESTYLE VARIABLES
ALCOHOL_USE: YES
TOTAL SCORE: 0
ON A TYPICAL DAY WHEN YOU DRINK ALCOHOL HOW MANY DRINKS DO YOU HAVE: 1
DOES PATIENT WANT TO STOP DRINKING: NO
TOTAL SCORE: 0
HAVE PEOPLE ANNOYED YOU BY CRITICIZING YOUR DRINKING: NO
HAVE YOU EVER FELT YOU SHOULD CUT DOWN ON YOUR DRINKING: NO
TOTAL SCORE: 0
AVERAGE NUMBER OF DAYS PER WEEK YOU HAVE A DRINK CONTAINING ALCOHOL: 1
EVER FELT BAD OR GUILTY ABOUT YOUR DRINKING: NO
EVER HAD A DRINK FIRST THING IN THE MORNING TO STEADY YOUR NERVES TO GET RID OF A HANGOVER: NO
HOW MANY TIMES IN THE PAST YEAR HAVE YOU HAD 5 OR MORE DRINKS IN A DAY: 0
CONSUMPTION TOTAL: NEGATIVE

## 2020-05-09 ASSESSMENT — COGNITIVE AND FUNCTIONAL STATUS - GENERAL
WALKING IN HOSPITAL ROOM: A LITTLE
STANDING UP FROM CHAIR USING ARMS: A LITTLE
DRESSING REGULAR LOWER BODY CLOTHING: A LITTLE
MOBILITY SCORE: 18
DAILY ACTIVITIY SCORE: 18
TURNING FROM BACK TO SIDE WHILE IN FLAT BAD: A LITTLE
SUGGESTED CMS G CODE MODIFIER MOBILITY: CK
SUGGESTED CMS G CODE MODIFIER DAILY ACTIVITY: CK
PERSONAL GROOMING: A LITTLE
TOILETING: A LITTLE
MOVING FROM LYING ON BACK TO SITTING ON SIDE OF FLAT BED: A LITTLE
EATING MEALS: A LITTLE
MOVING TO AND FROM BED TO CHAIR: A LITTLE
CLIMB 3 TO 5 STEPS WITH RAILING: A LITTLE
HELP NEEDED FOR BATHING: A LITTLE
DRESSING REGULAR UPPER BODY CLOTHING: A LITTLE

## 2020-05-09 ASSESSMENT — ENCOUNTER SYMPTOMS
PSYCHIATRIC NEGATIVE: 1
RESPIRATORY NEGATIVE: 1
NEUROLOGICAL NEGATIVE: 1
MYALGIAS: 1
EYES NEGATIVE: 1
CARDIOVASCULAR NEGATIVE: 1
GASTROINTESTINAL NEGATIVE: 1

## 2020-05-09 ASSESSMENT — PATIENT HEALTH QUESTIONNAIRE - PHQ9
SUM OF ALL RESPONSES TO PHQ9 QUESTIONS 1 AND 2: 0
1. LITTLE INTEREST OR PLEASURE IN DOING THINGS: NOT AT ALL
2. FEELING DOWN, DEPRESSED, IRRITABLE, OR HOPELESS: NOT AT ALL

## 2020-05-09 ASSESSMENT — FIBROSIS 4 INDEX: FIB4 SCORE: 1.84

## 2020-05-09 NOTE — PROGRESS NOTES
Patient walked to the bathroom to brush teeth and try to void. Patient has no urge to void at all and is unable to at this time. Patient became short of breath and light headed so we returned to the edge of bed. Patient oxygen saturation was at 88%. Will attempt to walk again after patient recovers.

## 2020-05-09 NOTE — PROGRESS NOTES
Updated Mariana Narayanan regarding patient low urine output and 40mL bladder scan, per APN no new orders at this time.

## 2020-05-09 NOTE — PROGRESS NOTES
Assumed care at 0700. Bedside report received from Marito. Patient's chart and MAR reviewed. Pt denies pain at this time. Pt is A & O 4. Patient was updated on plan of care for the day, discussed the need for dixon catheter placement. Questions answered and concerns addressed.  Pt denies any additional needs at this time. White board updated. Call light, phone and personal belongings within reach.

## 2020-05-09 NOTE — THERAPY
Contact Note     PT inés acknowledge, spoke with RN, hold secondary to low BP, likely until tomorrow.  Will attempt again when appropriate.     Ty

## 2020-05-09 NOTE — CARE PLAN
Respiratory Update    Treatment modality: pep   Frequency: qid     IS 1250ml.     Pt tolerating current treatments well with no adverse reactions.

## 2020-05-09 NOTE — PROGRESS NOTES
Pt BP 75/45. Pt A&O x4, felt mildly dizzy when sitting in chair, transferred to bed and laid flat.   EVITA Narayanan notified, 500 mL NS bolus given per orders. BP up to 91/52 (MAP 65). Will re-check BP again in 15 minutes.

## 2020-05-09 NOTE — THERAPY
OT evaluation attempted, per RN via PT, hold evaluation 2/2 hypotension. Will attempt tomorrow as appropriate.    Oksana Vasquez MS OTR/L, pager # [phone]

## 2020-05-09 NOTE — PROGRESS NOTES
Pharmacy Warfarin Monitoring     Date: 5/9/2020  Reason for Anticoagulation: Bioprosthetic Valve Replacement - AVR  Target INR: 2.0 to 3.0      Hemoglobin Value: (!) 9.6  Hematocrit Value: (!) 30.4  Lab Platelet Value: (!) 139    INR from last 7 days     Date/Time INR Value    05/09/20 0233  1.13    05/08/20 0500  1.13    05/07/20 1057  (!) 1.28    05/06/20 1105  0.95        Dose from last 7 days     Date/Time Dose (mg)    05/09/20 1505  5    05/08/20 1334  7.5        Home Dose: New start  Significant Interactions: Aspirin, Allopurinol, Statin  Bridge Therapy: Not indicated - expected post operative anemia    Reversal Agent Administered: Not Applicable    Comments: New start warfarin for AVR.  No concerns for bleeding noted.  SULTANA overnight - medications adjusted accordingly.  Will continue with warfarin as planned, 5 mg dose planned for tonight.  Pharmacy to follow daily during admission.    Education Material Provided?: Not at this time  Pharmacist suggested discharge dosing: TBD pending response to warfarin initiation.  5 mg daily is reasonable with INR follow-up within 72 hours of discharge.     Thank you!  Grace Boss, PharmD, BCCCP

## 2020-05-09 NOTE — PROGRESS NOTES
Paged Cardiology APN to update on patients morning labs, was given an order to give 80 mg Iv lasix, no other orders at this time.

## 2020-05-09 NOTE — PROGRESS NOTES
Spoke with Dominique CALDERÓN this morning, updated on patient still not being able to void. Received orders to place a [place] received.

## 2020-05-09 NOTE — PROGRESS NOTES
Assumed care of pt, beside report received from KEILA Chao. Updated on POC, call light within reach all fall precautions within place. Bed locked and lowered. Pt instructed to call for assistance before getting up. All questions answered, no other needs at this time.

## 2020-05-09 NOTE — PROGRESS NOTES
SBP back down to 80's. EVITA Narayanan notified, second 500 mL NS bolus given per order.  SBP up to 99/55. Pt alert, oriented, able to pivot to commode.

## 2020-05-09 NOTE — CARE PLAN
Problem: Communication  Goal: The ability to communicate needs accurately and effectively will improve  Outcome: PROGRESSING AS EXPECTED  Intervention: [place] patient and significant other/support system to call light to alert staff of needs  Flowsheets (Taken 5/9/2020 0043)  Oriented to:: All of the Following : Location of Bathroom, Visiting Policy, Unit Routine, Call Light and Bedside Controls, Bedside Rail Policy, Smoking Policy, Rights and Responsibilities, Bedside Report, and Patient Education Notebook  Note: Pt educated on POC and medications. Pt verbalized understanding.      Problem: Safety  Goal: Will remain free from injury  Outcome: PROGRESSING AS EXPECTED  Intervention: Provide assistance with mobility  Flowsheets (Taken 5/8/2020 2000 by Jeremie Gallegos R.N.)  Assistance: Standby Assist  Ambulation Tolerance: General Weakness  Note: Pt bedside table and call-light are within reach, bed in lowest position and locked. Treaded socks on and pt has been educated on call light use and asked to call before getting up.

## 2020-05-09 NOTE — PROGRESS NOTES
Cardiovascular Surgery Progress Note    Name: Sanjeev Malagon  MRN: [id number]  : 1965  Admit Date: 2020  5:27 AM  Procedure:  Procedure(s) and Anesthesia Type:     * REPLACEMENT, AORTIC VALVE - General     * ECHOCARDIOGRAM, TRANSESOPHAGEAL - General  2 Day Post-Op    Vitals:  Patient Vitals for the past 8 hrs:   Temp SpO2 O2 (LPM) Pulse Resp BP Weight   20 0555 -- 92 % 5 -- -- -- --   20 0554 -- 92 % 5 -- -- -- --   20 0550 -- 88 % 4 -- -- -- --   20 0534 36.2 °C (97.1 °F) 94 % 4 90 18 106/55 --   20 0340 -- -- -- -- -- -- 93.7 kg (206 lb 9.1 oz)   20 0328 36.1 °C (97 °F) 94 % 4 90 16 124/51 --   20 0240 36.2 °C (97.2 °F) 97 % 4 88 17 (!) 99/49 --     Temp (24hrs), Av.2 °C (97.2 °F), Min:36.1 °C (97 °F), Max:36.4 °C (97.5 °F)      Respiratory:    Respiration: 18, Pulse Oximetry: 92 %     Chest Tube Drains:          Fluids:    Intake/Output Summary (Last 24 hours) at 2020 0848  Last data filed at 2020 0630  Gross per 24 hour   Intake 1349.33 ml   Output 500 ml   Net 849.33 ml     Admit weight: Weight: 88 kg (194 lb)  Current weight: Weight: 93.7 kg (206 lb 9.1 oz) (20 0340)    Labs:  Recent Labs     20  1105 20  1057 20  0500 20  0233   WBC 8.5  --  18.2* 17.2*   RBC 4.24*  --  3.79* 3.05*   HEMOGLOBIN 13.5*  --  12.1* 9.6*   HEMATOCRIT 40.2*  --  36.0* 30.4*   MCV 94.8  --  95.0 99.7*   MCH 31.8  --  31.9 31.5   MCHC 33.6*  --  33.6* 31.6*   RDW 44.8  --  45.7 49.1   PLATELETCT 255 177 194 139*   MPV 10.4  --  10.4 10.3     Recent Labs     20  1105   NEUTSPOLYS 56.00   LYMPHOCYTES 27.20   MONOCYTES 10.00   EOSINOPHILS 5.70   BASOPHILS 0.90     Recent Labs     20  1105  20  2300 20  0500 20  0233   SODIUM 135  --   --  132* 131*   POTASSIUM 5.4   < > 5.4 4.9 5.2   CHLORIDE 102  --   --  102 98   CO2 24  --   --  19* 22   GLUCOSE 106*  --   --  124* 105*   BUN 20  --   --  20 37*    CREATININE 0.70  --   --  0.75 3.36*   CALCIUM 9.8  --   --  8.6 8.1*    < > = values in this interval not displayed.     Recent Labs     05/06/20  1105 05/07/20  1057 05/08/20  0500 05/09/20  0233   APTT 30.2 32.1  --   --    INR 0.95 1.28* 1.13 1.13       Medications:  • insulin NPH  10 Units      And   • insulin lispro  7 Units     • insulin lispro  0-20 Units     • atorvastatin  5 mg     • allopurinol  200 mg     • oxyCODONE CR  10 mg     • warfarin  5 mg     • magnesium sulfate  1 g Stopped (05/08/20 0625)   • aspirin EC  81 mg     • Pharmacy Consult Request  1 Each     • acetaminophen  1,000 mg     • gabapentin  300 mg      Followed by   • [START ON 5/12/2020] gabapentin  100 mg     • senna-docusate  2 Tab      And   • polyethylene glycol/lytes  1 Packet      And   • magnesium hydroxide  30 mL     • MD Alert...Warfarin per Pharmacy            Ordered Medications:    ASA - Yes    Plavix - No; contraindicated because of Other aortic valve    Post-operative Beta Blockers - No; contraindicated because of High risk for heart block    Ace Inhibitor - Yes    Statin - Yes        Exam:   Review of Systems   Constitutional: Positive for malaise/fatigue.   HENT: Negative.    Eyes: Negative.    Respiratory: Negative.    Cardiovascular: Negative.    Gastrointestinal: Negative.    Genitourinary: Negative.    Musculoskeletal: Positive for myalgias.   Skin: Negative.    Neurological: Negative.    Endo/Heme/Allergies: Negative.    Psychiatric/Behavioral: Negative.        Physical Exam  Constitutional:       General: He is not in acute distress.     Appearance: He is well-developed. He is not diaphoretic.   Neck:      Musculoskeletal: Neck supple.   Cardiovascular:      Rate and Rhythm: Normal rate and regular rhythm.      Heart sounds: Normal heart sounds. No murmur. No friction rub. No gallop.    Pulmonary:      Effort: Pulmonary effort is normal. No respiratory distress.      Breath sounds: Examination of the right-lower  field reveals decreased breath sounds. Examination of the left-lower field reveals decreased breath sounds. Decreased breath sounds present. No wheezing or rales.   Abdominal:      General: There is no distension.      Palpations: Abdomen is soft.      Tenderness: There is no abdominal tenderness.   Musculoskeletal:      Right lower leg: Edema present.      Left lower leg: Edema present.   Skin:     General: Skin is warm and dry.   Neurological:      Mental Status: He is alert and oriented to person, place, and time.   Psychiatric:         Mood and Affect: Mood normal.         Behavior: Behavior normal.         Thought Content: Thought content normal.         Judgment: Judgment normal.       Quality Measures:   Quality-Core Measures   Reviewed items::  EKG reviewed, Labs reviewed, Medications reviewed and Radiology images reviewed    Assessment/Plan:  POD 1 HTN- on cleviprex- starting home lisinopril, diurese, d/c mediastinal tubes, d/c dixon, pain - add toradol/oxycontin, transfer after off cleviprex  POD 2 HDS, SR, CR elevated/low UO- d/c lisinopril/toradol- replaced dixon 180 cc UO, BMP at 1200, amb, enc IS    Active Hospital Problems    Diagnosis   • Encounter for weaning from ventilator (HCC) [Z99.11]     Priority: High   • Aortic stenosis, severe [I35.0]     Priority: High   • History of SBE (subacute bacterial endocarditis) [Z86.79]     Priority: Medium   • Hypertension [I10]     Priority: Medium   • Acute blood loss as cause of postoperative anemia [D62]     Priority: Low   • Hyperkalemia [E87.5]     Priority: Low

## 2020-05-10 ENCOUNTER — APPOINTMENT (OUTPATIENT)
Dept: RADIOLOGY | Facility: MEDICAL CENTER | Age: 55
DRG: 220 | End: 2020-05-10
Attending: THORACIC SURGERY (CARDIOTHORACIC VASCULAR SURGERY)
Payer: COMMERCIAL

## 2020-05-10 LAB
ANION GAP SERPL CALC-SCNC: 12 MMOL/L (ref 7–16)
BUN SERPL-MCNC: 39 MG/DL (ref 8–22)
CALCIUM SERPL-MCNC: 8.3 MG/DL (ref 8.5–10.5)
CHLORIDE SERPL-SCNC: 97 MMOL/L (ref 96–112)
CO2 SERPL-SCNC: 20 MMOL/L (ref 20–33)
CREAT SERPL-MCNC: 1.68 MG/DL (ref 0.5–1.4)
ERYTHROCYTE [DISTWIDTH] IN BLOOD BY AUTOMATED COUNT: 46.8 FL (ref 35.9–50)
GLUCOSE SERPL-MCNC: 104 MG/DL (ref 65–99)
HCT VFR BLD AUTO: 28.9 % (ref 42–52)
HGB BLD-MCNC: 9.4 G/DL (ref 14–18)
INR PPP: 1.7 (ref 0.87–1.13)
MCH RBC QN AUTO: 31.5 PG (ref 27–33)
MCHC RBC AUTO-ENTMCNC: 32.5 G/DL (ref 33.7–35.3)
MCV RBC AUTO: 97 FL (ref 81.4–97.8)
PLATELET # BLD AUTO: 130 K/UL (ref 164–446)
PMV BLD AUTO: 10.7 FL (ref 9–12.9)
POTASSIUM SERPL-SCNC: 5.1 MMOL/L (ref 3.6–5.5)
PROTHROMBIN TIME: 20.4 SEC (ref 12–14.6)
RBC # BLD AUTO: 2.98 M/UL (ref 4.7–6.1)
SODIUM SERPL-SCNC: 129 MMOL/L (ref 135–145)
WBC # BLD AUTO: 13 K/UL (ref 4.8–10.8)

## 2020-05-10 PROCEDURE — 97161 PT EVAL LOW COMPLEX 20 MIN: CPT

## 2020-05-10 PROCEDURE — 99024 POSTOP FOLLOW-UP VISIT: CPT | Performed by: THORACIC SURGERY (CARDIOTHORACIC VASCULAR SURGERY)

## 2020-05-10 PROCEDURE — 94760 N-INVAS EAR/PLS OXIMETRY 1: CPT

## 2020-05-10 PROCEDURE — 700102 HCHG RX REV CODE 250 W/ 637 OVERRIDE(OP): Performed by: NURSE PRACTITIONER

## 2020-05-10 PROCEDURE — 700102 HCHG RX REV CODE 250 W/ 637 OVERRIDE(OP): Performed by: THORACIC SURGERY (CARDIOTHORACIC VASCULAR SURGERY)

## 2020-05-10 PROCEDURE — 97165 OT EVAL LOW COMPLEX 30 MIN: CPT

## 2020-05-10 PROCEDURE — 94669 MECHANICAL CHEST WALL OSCILL: CPT

## 2020-05-10 PROCEDURE — 85610 PROTHROMBIN TIME: CPT

## 2020-05-10 PROCEDURE — 85027 COMPLETE CBC AUTOMATED: CPT

## 2020-05-10 PROCEDURE — 700105 HCHG RX REV CODE 258: Performed by: NURSE PRACTITIONER

## 2020-05-10 PROCEDURE — 770020 HCHG ROOM/CARE - TELE (206)

## 2020-05-10 PROCEDURE — A9270 NON-COVERED ITEM OR SERVICE: HCPCS | Performed by: THORACIC SURGERY (CARDIOTHORACIC VASCULAR SURGERY)

## 2020-05-10 PROCEDURE — 80048 BASIC METABOLIC PNL TOTAL CA: CPT

## 2020-05-10 PROCEDURE — A9270 NON-COVERED ITEM OR SERVICE: HCPCS | Performed by: NURSE PRACTITIONER

## 2020-05-10 PROCEDURE — 71046 X-RAY EXAM CHEST 2 VIEWS: CPT

## 2020-05-10 RX ORDER — GUAIFENESIN 600 MG/1
600 TABLET, EXTENDED RELEASE ORAL EVERY 12 HOURS
Status: DISCONTINUED | OUTPATIENT
Start: 2020-05-10 | End: 2020-05-14

## 2020-05-10 RX ORDER — WARFARIN SODIUM 2.5 MG/1
2.5 TABLET ORAL DAILY
Status: DISCONTINUED | OUTPATIENT
Start: 2020-05-10 | End: 2020-05-11

## 2020-05-10 RX ADMIN — ACETAMINOPHEN 1000 MG: 500 TABLET ORAL at 23:19

## 2020-05-10 RX ADMIN — GUAIFENESIN 600 MG: 600 TABLET, EXTENDED RELEASE ORAL at 17:02

## 2020-05-10 RX ADMIN — WARFARIN SODIUM 2.5 MG: 2.5 TABLET ORAL at 17:02

## 2020-05-10 RX ADMIN — ACETAMINOPHEN 500 MG: 500 TABLET ORAL at 17:02

## 2020-05-10 RX ADMIN — MAGNESIUM HYDROXIDE 30 ML: 400 SUSPENSION ORAL at 06:00

## 2020-05-10 RX ADMIN — ATORVASTATIN CALCIUM 5 MG: 10 TABLET, FILM COATED ORAL at 06:00

## 2020-05-10 RX ADMIN — OXYCODONE HYDROCHLORIDE 10 MG: 10 TABLET ORAL at 21:03

## 2020-05-10 RX ADMIN — POLYETHYLENE GLYCOL 3350 1 PACKET: 17 POWDER, FOR SOLUTION ORAL at 06:01

## 2020-05-10 RX ADMIN — GABAPENTIN 300 MG: 300 CAPSULE ORAL at 06:00

## 2020-05-10 RX ADMIN — SENNOSIDES AND DOCUSATE SODIUM 2 TABLET: 8.6; 5 TABLET ORAL at 06:00

## 2020-05-10 RX ADMIN — ACETAMINOPHEN 1000 MG: 500 TABLET ORAL at 06:00

## 2020-05-10 RX ADMIN — OXYCODONE HYDROCHLORIDE 10 MG: 10 TABLET ORAL at 15:58

## 2020-05-10 RX ADMIN — SODIUM CHLORIDE: 9 INJECTION, SOLUTION INTRAVENOUS at 06:56

## 2020-05-10 RX ADMIN — ALLOPURINOL 200 MG: 100 TABLET ORAL at 06:00

## 2020-05-10 RX ADMIN — OXYCODONE HYDROCHLORIDE 10 MG: 10 TABLET, FILM COATED, EXTENDED RELEASE ORAL at 06:00

## 2020-05-10 RX ADMIN — ALLOPURINOL 200 MG: 100 TABLET ORAL at 17:02

## 2020-05-10 RX ADMIN — ASPIRIN 81 MG: 81 TABLET, COATED ORAL at 06:00

## 2020-05-10 RX ADMIN — ACETAMINOPHEN 1000 MG: 500 TABLET ORAL at 12:14

## 2020-05-10 RX ADMIN — GUAIFENESIN 600 MG: 600 TABLET, EXTENDED RELEASE ORAL at 10:16

## 2020-05-10 ASSESSMENT — COGNITIVE AND FUNCTIONAL STATUS - GENERAL
SUGGESTED CMS G CODE MODIFIER MOBILITY: CI
SUGGESTED CMS G CODE MODIFIER DAILY ACTIVITY: CJ
DRESSING REGULAR LOWER BODY CLOTHING: A LITTLE
MOBILITY SCORE: 23
HELP NEEDED FOR BATHING: A LITTLE
CLIMB 3 TO 5 STEPS WITH RAILING: A LITTLE
DAILY ACTIVITIY SCORE: 22

## 2020-05-10 ASSESSMENT — ENCOUNTER SYMPTOMS
MYALGIAS: 1
NEUROLOGICAL NEGATIVE: 1
CARDIOVASCULAR NEGATIVE: 1
PSYCHIATRIC NEGATIVE: 1
GASTROINTESTINAL NEGATIVE: 1
EYES NEGATIVE: 1
RESPIRATORY NEGATIVE: 1

## 2020-05-10 ASSESSMENT — FIBROSIS 4 INDEX: FIB4 SCORE: 1.97

## 2020-05-10 ASSESSMENT — ACTIVITIES OF DAILY LIVING (ADL): TOILETING: INDEPENDENT

## 2020-05-10 ASSESSMENT — GAIT ASSESSMENTS
GAIT LEVEL OF ASSIST: SUPERVISED
DISTANCE (FEET): 150

## 2020-05-10 NOTE — THERAPY
"Physical Therapy Evaluation completed.   Bed Mobility:  Supine to Sit: Supervised (HOB flat, used cardiac pillow for bracing)  Transfers: Sit to Stand: Supervised  Gait: Level Of Assist: Supervised with No Equipment Needed       Plan of Care: Patient with no further skilled PT needs in the acute care setting at this time  Discharge Recommendations: Equipment: No Equipment Needed. Post-acute therapy: Currently anticipate no further skilled therapy needs once patient is discharged from the inpatient setting.    See \"Rehab Therapy-Acute\" Patient Summary Report for complete documentation.    Patient is a pleasant 54 YO male that is s/p aortic valve replacement. He presented to PT with decreased activity tolerance and some incisional pain but appears to be near baseline functional mobility. He ambulated approximately 150ft x4 with no AD and supervision. Distance limited by therapist due to positive talk test and then patient paced activity appropriately following education. He demonstrated understanding of education regarding cardiac risk factors, phase 1 cardiac rehab, home exercise program and appropriate activity progression, self monitoring via RPE scale/talk test/HR. He may benefit from outpatient cardiac rehab for further education and monitoring during exercise but also demonstrated ability to safely self monitor and progress activity at home. Patient will not be actively followed for physical therapy services at this time, however may be seen if requested by physician for 1 more visit within 30 days to address any discharge or equipment needs.  "

## 2020-05-10 NOTE — CARE PLAN
Problem: Post op day 2 CABG/Heart Valve Replacement  Goal: Optimal care of the post op CABG/heart valve replacement post op day 2  Outcome: PROGRESSING AS EXPECTED  Intervention: FSBS: when 2 consecutive BS < 130 after post op day 2, discontinue FSBS unless patient is insulin dependent diabetic  Note: NA  Intervention: Daily Weights  Note: Completed  Intervention: Up in chair for all meals  Note: NA  Intervention: Ambulate, increasing the distance each time x 3 and before bed  Note: Pt ambulated 60 ft before bed  Intervention: Stand at sink and wash up with assistance.  Clean incisions twice daily with soap and water.  Note: Completed  Intervention: IS q 1 hour while awake and record best IS volume  Note: Pt educated on IS use.   Intervention: Consider pacer wire removal by MD  Note: NA  Intervention: Consider removal of dixon and chest tube if not already done  Note: Pt requires dixon for strict I/O.

## 2020-05-10 NOTE — PROGRESS NOTES
Cardiovascular Surgery Progress Note    Name: Sanjeev Malagon  MRN: [id number]  : 1965  Admit Date: 2020  5:27 AM  Procedure:  Procedure(s) and Anesthesia Type:     * REPLACEMENT, AORTIC VALVE - General     * ECHOCARDIOGRAM, TRANSESOPHAGEAL - General  3 Day Post-Op    Vitals:  Patient Vitals for the past 8 hrs:   Temp SpO2 O2 Delivery Device O2 (LPM) Pulse Resp BP Weight   05/10/20 0400 36.1 °C (96.9 °F) 91 % Nasal Cannula 2.5 (!) 101 18 133/71 95 kg (209 lb 7 oz)   20 2332 35.9 °C (96.6 °F) 93 % Nasal Cannula 2.5 90 16 (!) 97/64 --     Temp (24hrs), Av.9 °C (96.7 °F), Min:35.8 °C (96.5 °F), Max:36.1 °C (96.9 °F)      Respiratory:    Respiration: 18, Pulse Oximetry: 91 %     Chest Tube Drains:          Fluids:    Intake/Output Summary (Last 24 hours) at 5/10/2020 0714  Last data filed at 5/10/2020 0600  Gross per 24 hour   Intake 2380 ml   Output 1590 ml   Net 790 ml     Admit weight: Weight: 88 kg (194 lb)  Current weight: Weight: 95 kg (209 lb 7 oz) (05/10/20 0400)    Labs:  Recent Labs     20  0500 20  0233 05/10/20  0335   WBC 18.2* 17.2* 13.0*   RBC 3.79* 3.05* 2.98*   HEMOGLOBIN 12.1* 9.6* 9.4*   HEMATOCRIT 36.0* 30.4* 28.9*   MCV 95.0 99.7* 97.0   MCH 31.9 31.5 31.5   MCHC 33.6* 31.6* 32.5*   RDW 45.7 49.1 46.8   PLATELETCT 194 139* 130*   MPV 10.4 10.3 10.7         Recent Labs     20  0233 20  1230 05/10/20  0335   SODIUM 131* 126* 129*   POTASSIUM 5.2 5.7* 5.1   CHLORIDE 98 95* 97   CO2 22 21 20   GLUCOSE 105* 99 104*   BUN 37* 42* 39*   CREATININE 3.36* 4.17* 1.68*   CALCIUM 8.1* 8.2* 8.3*     Recent Labs     20  1057 20  0500 20  0233 05/10/20  0335   APTT 32.1  --   --   --    INR 1.28* 1.13 1.13 1.70*       Medications:  • gabapentin  300 mg      Followed by   • [START ON 2020] gabapentin  100 mg     • atorvastatin  5 mg     • allopurinol  200 mg     • oxyCODONE CR  10 mg     • warfarin  5 mg     • aspirin EC  81 mg     •  Pharmacy Consult Request  1 Each     • acetaminophen  1,000 mg     • senna-docusate  2 Tab      And   • polyethylene glycol/lytes  1 Packet      And   • magnesium hydroxide  30 mL     • MD Alert...Warfarin per Pharmacy            Ordered Medications:    ASA - Yes    Plavix - No; contraindicated because of Other aortic valve    Post-operative Beta Blockers - No; contraindicated because of High risk for heart block    Ace Inhibitor - Yes    Statin - Yes        Exam:   Review of Systems   Constitutional: Positive for malaise/fatigue.   HENT: Negative.    Eyes: Negative.    Respiratory: Negative.    Cardiovascular: Negative.    Gastrointestinal: Negative.    Genitourinary: Negative.    Musculoskeletal: Positive for myalgias.   Skin: Negative.    Neurological: Negative.    Endo/Heme/Allergies: Negative.    Psychiatric/Behavioral: Negative.        Physical Exam  Constitutional:       General: He is not in acute distress.     Appearance: He is well-developed. He is not diaphoretic.   Neck:      Musculoskeletal: Neck supple.   Cardiovascular:      Rate and Rhythm: Normal rate and regular rhythm.      Heart sounds: Normal heart sounds. No murmur. No friction rub. No gallop.    Pulmonary:      Effort: Pulmonary effort is normal. No respiratory distress.      Breath sounds: Examination of the right-lower field reveals decreased breath sounds. Examination of the left-lower field reveals decreased breath sounds. Decreased breath sounds present. No wheezing or rales.   Abdominal:      General: There is no distension.      Palpations: Abdomen is soft.      Tenderness: There is no abdominal tenderness.   Musculoskeletal:      Right lower leg: Edema present.      Left lower leg: Edema present.   Skin:     General: Skin is warm and dry.   Neurological:      Mental Status: He is alert and oriented to person, place, and time.   Psychiatric:         Mood and Affect: Mood normal.         Behavior: Behavior normal.         Thought  Content: Thought content normal.         Judgment: Judgment normal.       Quality Measures:   Quality-Core Measures   Reviewed items::  EKG reviewed, Labs reviewed, Medications reviewed and Radiology images reviewed    Assessment/Plan:  POD 1 HTN- on cleviprex- starting home lisinopril, diurese, d/c mediastinal tubes, d/c dixon, pain - add toradol/oxycontin, transfer after off cleviprex  POD 2 HDS, SR, CR elevated/low UO- d/c lisinopril/toradol- replaced dixon 180 cc UO, BMP at 1200, amb, enc IS  POD 3 HDS BP improved with fluids, SR, D/c pacer wires, CR improved with fluids- good UO 1600 yesterday- nephrology was consulted- thanks for your input!, will d/c fluids today, ambulate    Active Hospital Problems    Diagnosis   • Encounter for weaning from ventilator (HCC) [Z99.11]     Priority: High   • Aortic stenosis, severe [I35.0]     Priority: High   • History of SBE (subacute bacterial endocarditis) [Z86.79]     Priority: Medium   • Hypertension [I10]     Priority: Medium   • Acute blood loss as cause of postoperative anemia [D62]     Priority: Low   • Hyperkalemia [E87.5]     Priority: Low

## 2020-05-10 NOTE — THERAPY
"Occupational Therapy Evaluation completed.   Functional Status:  SPV supine>sit, SPV sit>stand, SPV toilet txf, SPV LB dress, SPV standing grooming, SPV functional mobility of household spaces without AD, SPV EOB>chair txf, good support at home  Plan of Care: Patient with no further skilled OT needs in the acute care setting at this time  Discharge Recommendations:  Equipment: No Equipment Needed. Post-acute therapy Anticipate that the patient will have no further occupational therapy needs after discharge from the hospital.     See \"Rehab Therapy-Acute\" Patient Summary Report for complete documentation.    Pt is 54yo male POD#3 aortic valve replacement. Pt presents to OT eval at SPV level for functional activities and transfers. Pt very receptive to education re: sternal precautions, cardiac precautions and gradual increase in activities upon DC. Post-op packet provided for reference of education. Pt reports very supportive spouse and teenage/young adult son's at home to assist. Pt reports access to DME for increased home safety including shower chair and FWW however pt did not require FWW during evaluation. Pt with no additional acute OT needs, reports no questions or concerns. Recommend DC home with family upon medical clearance.   "

## 2020-05-10 NOTE — PROGRESS NOTES
Pharmacy Warfarin Monitoring     Date: 5/10/2020  Reason for Anticoagulation: Bioprosthetic Valve Replacement - AVR  Target INR: 2.0 to 3.0     Hemoglobin Value: (!) 9.4  Hematocrit Value: (!) 28.9  Lab Platelet Value: (!) 130    INR from last 7 days     Date/Time INR Value    05/10/20 0335  (!) 1.7    05/09/20 0233  1.13    05/08/20 0500  1.13    05/07/20 1057  (!) 1.28    05/06/20 1105  0.95        Dose from last 7 days     Date/Time Dose (mg)    05/10/20 1642  2.5    05/09/20 1505  5    05/08/20 1334  7.5        Home dose:  New start  Significant Interactions: Aspirin, Statin  Bridge Therapy: Not indicated at this time    Reversal Agent Administered: Not Applicable  Comments: New start warfarin for AVR.  No concerns for bleeding noted.  SULTANA resolving.  INR remains subtherapeutic but increased significantly from previous - will give decreased dose of 2.5 mg this evening.  Pharmacy to follow daily during admission.    Education Material Provided?: Not at this time  Pharmacist suggested discharge dosing: TBD pending response to warfarin initiation.  2.5 mg daily is reasonable with INR follow-up within 72 hours of discharge.     Thank you!  Grace Boss, PharmD, BCCCP

## 2020-05-10 NOTE — CARE PLAN
Problem: Communication  Goal: The ability to communicate needs accurately and effectively will improve  Outcome: PROGRESSING AS EXPECTED   Pt educated on POC, discussed medications and treatment plan.   Problem: Safety  Goal: Will remain free from injury  Outcome: PROGRESSING AS EXPECTED   Pt educated to call before getting out of bed, fall precautions are in place.   Problem: Knowledge Deficit  Goal: Knowledge of disease process/condition, treatment plan, diagnostic tests, and medications will improve  Outcome: PROGRESSING AS EXPECTED   Pt educated on medications and close monitoring.   Problem: Respiratory:  Goal: Respiratory status will improve  Outcome: PROGRESSING AS EXPECTED   Pt still requiring 2 liters of oxygen, pt had chest x ray ordered per protocol.  Problem: Post Op Day 3 CABG/Heart Valve replacement  Goal: Optimal care of the post op CABG/Heart Valve replacement post op day 3  Outcome: PROGRESSING AS EXPECTED   Educated pt will continue to monitor.

## 2020-05-11 LAB
ANION GAP SERPL CALC-SCNC: 11 MMOL/L (ref 7–16)
BUN SERPL-MCNC: 14 MG/DL (ref 8–22)
CALCIUM SERPL-MCNC: 9.1 MG/DL (ref 8.5–10.5)
CHLORIDE SERPL-SCNC: 100 MMOL/L (ref 96–112)
CO2 SERPL-SCNC: 23 MMOL/L (ref 20–33)
CREAT SERPL-MCNC: 0.61 MG/DL (ref 0.5–1.4)
ERYTHROCYTE [DISTWIDTH] IN BLOOD BY AUTOMATED COUNT: 44.9 FL (ref 35.9–50)
GLUCOSE BLD-MCNC: 90 MG/DL (ref 65–99)
GLUCOSE SERPL-MCNC: 114 MG/DL (ref 65–99)
HCT VFR BLD AUTO: 29.1 % (ref 42–52)
HGB BLD-MCNC: 9.9 G/DL (ref 14–18)
INR PPP: 2.74 (ref 0.87–1.13)
MCH RBC QN AUTO: 31.7 PG (ref 27–33)
MCHC RBC AUTO-ENTMCNC: 34 G/DL (ref 33.7–35.3)
MCV RBC AUTO: 93.3 FL (ref 81.4–97.8)
PLATELET # BLD AUTO: 177 K/UL (ref 164–446)
PMV BLD AUTO: 10.4 FL (ref 9–12.9)
POTASSIUM SERPL-SCNC: 5.9 MMOL/L (ref 3.6–5.5)
PROTHROMBIN TIME: 30 SEC (ref 12–14.6)
RBC # BLD AUTO: 3.12 M/UL (ref 4.7–6.1)
SODIUM SERPL-SCNC: 134 MMOL/L (ref 135–145)
WBC # BLD AUTO: 11.5 K/UL (ref 4.8–10.8)

## 2020-05-11 PROCEDURE — 94669 MECHANICAL CHEST WALL OSCILL: CPT

## 2020-05-11 PROCEDURE — 700102 HCHG RX REV CODE 250 W/ 637 OVERRIDE(OP): Performed by: NURSE PRACTITIONER

## 2020-05-11 PROCEDURE — 85027 COMPLETE CBC AUTOMATED: CPT

## 2020-05-11 PROCEDURE — A9270 NON-COVERED ITEM OR SERVICE: HCPCS | Performed by: CLINICAL NURSE SPECIALIST

## 2020-05-11 PROCEDURE — 94760 N-INVAS EAR/PLS OXIMETRY 1: CPT

## 2020-05-11 PROCEDURE — 85610 PROTHROMBIN TIME: CPT

## 2020-05-11 PROCEDURE — 700102 HCHG RX REV CODE 250 W/ 637 OVERRIDE(OP): Performed by: THORACIC SURGERY (CARDIOTHORACIC VASCULAR SURGERY)

## 2020-05-11 PROCEDURE — 82962 GLUCOSE BLOOD TEST: CPT

## 2020-05-11 PROCEDURE — 700102 HCHG RX REV CODE 250 W/ 637 OVERRIDE(OP): Performed by: CLINICAL NURSE SPECIALIST

## 2020-05-11 PROCEDURE — 99024 POSTOP FOLLOW-UP VISIT: CPT | Performed by: THORACIC SURGERY (CARDIOTHORACIC VASCULAR SURGERY)

## 2020-05-11 PROCEDURE — A9270 NON-COVERED ITEM OR SERVICE: HCPCS | Performed by: THORACIC SURGERY (CARDIOTHORACIC VASCULAR SURGERY)

## 2020-05-11 PROCEDURE — A9270 NON-COVERED ITEM OR SERVICE: HCPCS | Performed by: NURSE PRACTITIONER

## 2020-05-11 PROCEDURE — 770020 HCHG ROOM/CARE - TELE (206)

## 2020-05-11 PROCEDURE — 80048 BASIC METABOLIC PNL TOTAL CA: CPT

## 2020-05-11 RX ORDER — HYDRALAZINE HYDROCHLORIDE 25 MG/1
25 TABLET, FILM COATED ORAL EVERY 8 HOURS PRN
Status: DISCONTINUED | OUTPATIENT
Start: 2020-05-11 | End: 2020-05-14 | Stop reason: HOSPADM

## 2020-05-11 RX ORDER — GABAPENTIN 100 MG/1
100 CAPSULE ORAL 2 TIMES DAILY
Status: DISCONTINUED | OUTPATIENT
Start: 2020-05-11 | End: 2020-05-14

## 2020-05-11 RX ORDER — AMLODIPINE BESYLATE 10 MG/1
10 TABLET ORAL
Status: DISCONTINUED | OUTPATIENT
Start: 2020-05-11 | End: 2020-05-14 | Stop reason: HOSPADM

## 2020-05-11 RX ORDER — ALPRAZOLAM 0.25 MG/1
0.25 TABLET ORAL EVERY 6 HOURS PRN
Status: DISCONTINUED | OUTPATIENT
Start: 2020-05-11 | End: 2020-05-14 | Stop reason: HOSPADM

## 2020-05-11 RX ADMIN — AMLODIPINE BESYLATE 10 MG: 10 TABLET ORAL at 17:21

## 2020-05-11 RX ADMIN — GUAIFENESIN 600 MG: 600 TABLET, EXTENDED RELEASE ORAL at 17:21

## 2020-05-11 RX ADMIN — ASPIRIN 81 MG: 81 TABLET, COATED ORAL at 06:23

## 2020-05-11 RX ADMIN — ATORVASTATIN CALCIUM 5 MG: 10 TABLET, FILM COATED ORAL at 06:18

## 2020-05-11 RX ADMIN — ACETAMINOPHEN 1000 MG: 500 TABLET ORAL at 06:19

## 2020-05-11 RX ADMIN — ACETAMINOPHEN 1000 MG: 500 TABLET ORAL at 11:43

## 2020-05-11 RX ADMIN — GABAPENTIN 100 MG: 100 CAPSULE ORAL at 17:33

## 2020-05-11 RX ADMIN — OXYCODONE HYDROCHLORIDE 10 MG: 10 TABLET ORAL at 23:08

## 2020-05-11 RX ADMIN — GABAPENTIN 100 MG: 100 CAPSULE ORAL at 12:46

## 2020-05-11 RX ADMIN — ACETAMINOPHEN 1000 MG: 500 TABLET ORAL at 23:08

## 2020-05-11 RX ADMIN — ALLOPURINOL 200 MG: 100 TABLET ORAL at 06:21

## 2020-05-11 RX ADMIN — OXYCODONE HYDROCHLORIDE 10 MG: 10 TABLET, FILM COATED, EXTENDED RELEASE ORAL at 06:20

## 2020-05-11 RX ADMIN — OXYCODONE HYDROCHLORIDE 10 MG: 10 TABLET ORAL at 03:53

## 2020-05-11 RX ADMIN — METOPROLOL TARTRATE 12.5 MG: 25 TABLET, FILM COATED ORAL at 12:47

## 2020-05-11 RX ADMIN — ALLOPURINOL 200 MG: 100 TABLET ORAL at 17:19

## 2020-05-11 RX ADMIN — GUAIFENESIN 600 MG: 600 TABLET, EXTENDED RELEASE ORAL at 06:24

## 2020-05-11 RX ADMIN — ACETAMINOPHEN 1000 MG: 500 TABLET ORAL at 17:19

## 2020-05-11 RX ADMIN — OXYCODONE HYDROCHLORIDE 10 MG: 10 TABLET, FILM COATED, EXTENDED RELEASE ORAL at 17:33

## 2020-05-11 ASSESSMENT — ENCOUNTER SYMPTOMS
RESPIRATORY NEGATIVE: 1
PSYCHIATRIC NEGATIVE: 1
GASTROINTESTINAL NEGATIVE: 1
CARDIOVASCULAR NEGATIVE: 1
MYALGIAS: 1
NEUROLOGICAL NEGATIVE: 1
EYES NEGATIVE: 1

## 2020-05-11 ASSESSMENT — FIBROSIS 4 INDEX: FIB4 SCORE: 1.45

## 2020-05-11 NOTE — PROGRESS NOTES
Pharmacy Warfarin Monitoring     Date: 5/11/2020  Reason for Anticoagulation: Bioprosthetic Valve Replacement - AVR  Target INR: 2.0 to 3.0      Hemoglobin Value: (!) 9.9  Hematocrit Value: (!) 29.1  Lab Platelet Value: 177    INR from last 7 days     Date/Time INR Value    05/11/20 0400  (!) 2.74    05/10/20 0335  (!) 1.7    05/09/20 0233  1.13    05/08/20 0500  1.13    05/07/20 1057  (!) 1.28    05/06/20 1105  0.95        Dose from last 7 days     Date/Time Dose (mg)    05/11/20 1546  0    05/10/20 1642  2.5    05/09/20 1505  5    05/08/20 1334  7.5        Average Dose: New start  Significant Interactions: Aspirin, Statin  Bridge Therapy: Not indicated, INR therapeutic    Reversal Agent Administered: Not Applicable    Comments: New start warfarin for AVR.  No concerns for bleeding noted.  INR therapeutic but with large increase from previous - plan to hold warfarin tonight and continue monitoring daily INR.  Pharmacy to follow daily during admission.    Education Material Provided?: Not at this time  Pharmacist suggested discharge dosing: TBD pending response to warfarin initiation.  Suggest 2.5 mg daily with INR within 72 hours of discharge.     Thank you!  Grace Boss, PharmD, BCCCP

## 2020-05-11 NOTE — PROGRESS NOTES
Asked patient if he would like to ambulate around unit. Patient refusing due to fatigue. Provided education on importance of ambulation. Patient verbalizes understanding and states will ambulate around unit in the morning.

## 2020-05-11 NOTE — PROGRESS NOTES
Cardiovascular Surgery Progress Note    Name: Sanjeev Malagon  MRN: [id number]  : 1965  Admit Date: 2020  5:27 AM  Procedure:  Procedure(s) and Anesthesia Type:     * REPLACEMENT, AORTIC VALVE - General     * ECHOCARDIOGRAM, TRANSESOPHAGEAL - General  4 Day Post-Op    Vitals:  Patient Vitals for the past 8 hrs:   Temp SpO2 O2 Delivery Device O2 (LPM) Pulse Resp BP Weight   20 0925 -- -- -- -- (!) 110 -- (!) 167/85 --   20 0751 37.2 °C (99 °F) 90 % Silicone Nasal Cannula 0 98 16 141/85 --   20 0659 -- 94 % None - Room Air -- -- -- -- --   20 0600 -- -- -- -- -- -- -- 90.8 kg (200 lb 2.8 oz)     Temp (24hrs), Av.7 °C (98 °F), Min:35.8 °C (96.5 °F), Max:37.3 °C (99.1 °F)      Respiratory:    Respiration: 16, Pulse Oximetry: 90 %     Chest Tube Drains:          Fluids:    Intake/Output Summary (Last 24 hours) at 2020 1158  Last data filed at 2020 1100  Gross per 24 hour   Intake 1140 ml   Output 7450 ml   Net -6310 ml     Admit weight: Weight: 88 kg (194 lb)  Current weight: Weight: 90.8 kg (200 lb 2.8 oz) (20 0600)    Labs:  Recent Labs     20  0233 05/10/20  0335 20  0400   WBC 17.2* 13.0* 11.5*   RBC 3.05* 2.98* 3.12*   HEMOGLOBIN 9.6* 9.4* 9.9*   HEMATOCRIT 30.4* 28.9* 29.1*   MCV 99.7* 97.0 93.3   MCH 31.5 31.5 31.7   MCHC 31.6* 32.5* 34.0   RDW 49.1 46.8 44.9   PLATELETCT 139* 130* 177   MPV 10.3 10.7 10.4         Recent Labs     20  1230 05/10/20  0335 20  0400   SODIUM 126* 129* 134*   POTASSIUM 5.7* 5.1 5.9*   CHLORIDE 95* 97 100   CO2 21 20 23   GLUCOSE 99 104* 114*   BUN 42* 39* 14   CREATININE 4.17* 1.68* 0.61   CALCIUM 8.2* 8.3* 9.1     Recent Labs     20  0233 05/10/20  0335 20  0400   INR 1.13 1.70* 2.74*       Medications:  • gabapentin  300 mg      Followed by   • gabapentin  100 mg     • guaiFENesin ER  600 mg     • warfarin  2.5 mg     • atorvastatin  5 mg     • allopurinol  200 mg     • oxyCODONE CR   10 mg     • aspirin EC  81 mg     • Pharmacy Consult Request  1 Each     • acetaminophen  1,000 mg     • senna-docusate  2 Tab      And   • polyethylene glycol/lytes  1 Packet      And   • magnesium hydroxide  30 mL     • MD Alert...Warfarin per Pharmacy            Ordered Medications:    ASA - Yes    Plavix - No; contraindicated because of Other aortic valve    Post-operative Beta Blockers - No; contraindicated because of High risk for heart block    Ace Inhibitor - Yes    Statin - Yes        Exam:   Review of Systems   Constitutional: Positive for malaise/fatigue.   HENT: Negative.    Eyes: Negative.    Respiratory: Negative.    Cardiovascular: Negative.    Gastrointestinal: Negative.    Genitourinary: Negative.    Musculoskeletal: Positive for myalgias.   Skin: Negative.    Neurological: Negative.    Endo/Heme/Allergies: Negative.    Psychiatric/Behavioral: Negative.        Physical Exam  Constitutional:       General: He is not in acute distress.     Appearance: He is well-developed. He is not diaphoretic.   Neck:      Musculoskeletal: Neck supple.   Cardiovascular:      Rate and Rhythm: Normal rate and regular rhythm.      Heart sounds: Normal heart sounds. No murmur. No friction rub. No gallop.    Pulmonary:      Effort: Pulmonary effort is normal. No respiratory distress.      Breath sounds: Examination of the right-lower field reveals decreased breath sounds. Examination of the left-lower field reveals decreased breath sounds. Decreased breath sounds present. No wheezing or rales.   Abdominal:      General: There is no distension.      Palpations: Abdomen is soft.      Tenderness: There is no abdominal tenderness.   Musculoskeletal:      Right lower leg: No edema.      Left lower leg: No edema.   Skin:     General: Skin is warm and dry.   Neurological:      Mental Status: He is alert and oriented to person, place, and time.   Psychiatric:         Mood and Affect: Mood normal.         Behavior: Behavior  normal.         Thought Content: Thought content normal.         Judgment: Judgment normal.       Quality Measures:   Quality-Core Measures   Reviewed items::  EKG reviewed, Labs reviewed, Medications reviewed and Radiology images reviewed    Assessment/Plan:  POD 1 HTN- on cleviprex- starting home lisinopril, diurese, d/c mediastinal tubes, d/c dixon, pain - add toradol/oxycontin, transfer after off cleviprex  POD 2 HDS, SR, CR elevated/low UO- d/c lisinopril/toradol- replaced dixon 180 cc UO, BMP at 1200, amb, enc IS  POD 3 HDS BP improved with fluids, SR, D/c pacer wires, CR improved with fluids- good UO 1600 yesterday- nephrology was consulted- thanks for your input!, will d/c fluids today, ambulate  POD 4  HDS, SR/ST neuro intact, wounds CDI, abdomen S/NT, fluid balance negative, wt down. INR therapeutic.  Plan:  Low dose BB, ACE, Statin, IS/ambulate. CPM.    Active Hospital Problems    Diagnosis   • Encounter for weaning from ventilator (HCC) [Z99.11]     Priority: High   • Aortic stenosis, severe [I35.0]     Priority: High   • History of SBE (subacute bacterial endocarditis) [Z86.79]     Priority: Medium   • Hypertension [I10]     Priority: Medium   • Acute blood loss as cause of postoperative anemia [D62]     Priority: Low   • Hyperkalemia [E87.5]     Priority: Low

## 2020-05-11 NOTE — PROGRESS NOTES
Cardiac Surgery RN Navigator Daily Rounding Note  Procedure Performed: Procedure(s) (LRB):  REPLACEMENT, AORTIC VALVE (N/A)  ECHOCARDIOGRAM, TRANSESOPHAGEAL (N/A)     By  Dr. Markham  4 Days Post-Op    Pertinent Overnight Events:    Creatinine much improve    Tachycardic with walking- sustained in 120's for one hour post walk    Weight down    Coumadin therapeutic    Neuro:  A&O: yes  Pain control needs: Oxycontin scheduled  Mobility: stand by assist    PT/OT no further needs    Cardiac/hemodynamics:  Temp (24hrs), Av.7 °C (98 °F), Min:35.8 °C (96.5 °F), Max:37.3 °C (99.1 °F)    Heart rhythm: SR to ST  Temp:  [35.8 °C (96.5 °F)-37.3 °C (99.1 °F)] 37.2 °C (99 °F)  Pulse:  [] 110  Resp:  [15-20] 16  BP: (121-167)/(64-88) 167/85  SpO2:  [90 %-98 %] 90 %      IV gtts:    12-hour chest tube output: n/a    /Weights:  Admit weight: Weight: 88 kg (194 lb)  Current Weight: Weight: 90.8 kg (200 lb 2.8 oz) (20 0600)  Weight change: -4.2 kg (-9 lb 4.2 oz)  DOWN from yesterday's weight of 95 kg    Intake/Output Summary (Last 24 hours) at 2020 1032  Last data filed at 2020 0800  Gross per 24 hour   Intake 900 ml   Output 6450 ml   Net -5550 ml     12-hour urine output 1600 cc  Since admit net I/O is -2.1 liters    Respiratory:      O2 needs: none RA  recent CXR: no  Incentive spirometry compliance: yes  POD #3  2 view CXR needed? no    GI:  Bowel movement: yes  Last BM:     Labs:  Recent Labs     20  0233 05/10/20  0335 20  0400   WBC 17.2* 13.0* 11.5*   RBC 3.05* 2.98* 3.12*   HEMOGLOBIN 9.6* 9.4* 9.9*   HEMATOCRIT 30.4* 28.9* 29.1*   MCV 99.7* 97.0 93.3   MCH 31.5 31.5 31.7   MCHC 31.6* 32.5* 34.0   RDW 49.1 46.8 44.9   PLATELETCT 139* 130* 177   MPV 10.3 10.7 10.4     Recent Labs     20  1230 05/10/20  0335 20  0400   SODIUM 126* 129* 134*   POTASSIUM 5.7* 5.1 5.9*   CHLORIDE 95* 97 100   CO2 21 20 23   GLUCOSE 99 104* 114*   BUN 42* 39* 14   CREATININE 4.17* 1.68*  0.61   CALCIUM 8.2* 8.3* 9.1     INR:   Recent Labs     05/08/20  0500 05/09/20  0233 05/10/20  0335 05/11/20  0400   INR 1.13 1.13 1.70* 2.74*       Medications:  ASA:  Yes  Plavix: No; contraindicated because of On Coumadin / NOAC  BB: No; contraindicated because of High risk for heart block  Statin: yes  ACE/ARB: No; contraindicated because of Increased creatinine/CKD  Lasix: no    LDA's:  Central Line: Yes  Dixon: yes  Chest tubes: no  Pacer Wires: no  Staples: no    Team's Goal for patient:    Consider heart rate control?-sustained tachycardia post ambulation for an hour    Consider restarting lasix now that creatinine normal and K is high?    Keep dixon for strict I&O's or remove today as UOP much improved?    Discharge plan:    Likely home in 1-2 days

## 2020-05-11 NOTE — PROGRESS NOTES
"Pt awoke agitated and confused. Pt expressed, \" He was in an office and was not comfortable with females assisting to the bathroom.\" Pt forgot that he had an indwelling urinary catheter.  Pt had difficult time time expressing reason for hospitalization. Placed him back in bed and he expressed that he, \"Just wants to sleep.\" Pt reoriented and verbalized orientation. After encounter pt sustained -121 bpm, all other vital signs WNL.  APRN for cardiothoracic surgery paged and notified. [facility] rapid response member involved with incident.   "

## 2020-05-11 NOTE — PROGRESS NOTES
Assumed care at 1900, bedside report received from Devin PEDRAZA. Pt is SR on the monitor. Initial assessment completed, orders reviewed. Call light within reach, bed alarm is in use, and hourly rounding in place. Provided education on fall precautions and use of call light, patient verbalizes understanding. POC addressed with patient, no additional questions at this time.

## 2020-05-11 NOTE — PROGRESS NOTES
Monitor Summary     SR-ST  (sustained in 120s after ambulation, back to baseline after an hour)  Rare PVC, rare PAC   .18/.08/.28

## 2020-05-11 NOTE — CARE PLAN
Problem: Post Op Day 3 CABG/Heart Valve replacement  Goal: Optimal care of the post op CABG/Heart Valve replacement post op day 3  Outcome: PROGRESSING SLOWER THAN EXPECTED  Intervention: Daily Weights  Note: Complete  Intervention: Shower daily and clean incisions twice daily with soap and water  Note: Incision care complete  Intervention: Up in chair for all meals  Note: N/A  Intervention: Ambulate, increasing the distance each time x 3 and before bed  Note: Pt refused ambulation before bed. Education provided regarding importance of ambulation post open heart surgery. Pt verbalizes understanding. Received in report from day shift RN that patient ambulated twice. Patient states will ambulate in the morning.   Intervention: IS q 1 hour while awake and record best IS volume  Note: Completed  Intervention: Consider removal of dixon, chest tube and pacer wire if not already done  Note: Dixon in place for critical I&Os.   Intervention: Case management and  for discharge needs  Note: Will coordinate with day shift to get completed.   Intervention: Discharge planning (See discharge barriers/planning problem on care plan)  Note: Patient still on oxygen. Attempt to wean off not successful. Dixon still in place. Will address to discontinue during day.

## 2020-05-11 NOTE — RESPIRATORY CARE
Multiple attempts to see pt for PEP this round and earlier this morning. Pt constantly on phone and refuses to end calls for therapy. This round alone pt was approached several times and keeps requesting RT return, only to be on the phone again. PEP held.

## 2020-05-11 NOTE — PROGRESS NOTES
Assumed care of patient. Received report from night shift RN.  Bed alarm on. Belongings within reach. White board updated. All questions and concerns addressed at this time.

## 2020-05-12 LAB
ANION GAP SERPL CALC-SCNC: 11 MMOL/L (ref 7–16)
APPEARANCE UR: CLEAR
BACTERIA #/AREA URNS HPF: NEGATIVE /HPF
BILIRUB UR QL STRIP.AUTO: NEGATIVE
BUN SERPL-MCNC: 11 MG/DL (ref 8–22)
CALCIUM SERPL-MCNC: 9.7 MG/DL (ref 8.5–10.5)
CHLORIDE SERPL-SCNC: 98 MMOL/L (ref 96–112)
CO2 SERPL-SCNC: 22 MMOL/L (ref 20–33)
COLOR UR: YELLOW
CREAT SERPL-MCNC: 0.63 MG/DL (ref 0.5–1.4)
EKG IMPRESSION: NORMAL
EPI CELLS #/AREA URNS HPF: NEGATIVE /HPF
ERYTHROCYTE [DISTWIDTH] IN BLOOD BY AUTOMATED COUNT: 45 FL (ref 35.9–50)
GLUCOSE SERPL-MCNC: 112 MG/DL (ref 65–99)
GLUCOSE UR STRIP.AUTO-MCNC: NEGATIVE MG/DL
HCT VFR BLD AUTO: 30.6 % (ref 42–52)
HGB BLD-MCNC: 10.4 G/DL (ref 14–18)
HYALINE CASTS #/AREA URNS LPF: ABNORMAL /LPF
INR PPP: 2.22 (ref 0.87–1.13)
KETONES UR STRIP.AUTO-MCNC: NEGATIVE MG/DL
LEUKOCYTE ESTERASE UR QL STRIP.AUTO: NEGATIVE
MAGNESIUM SERPL-MCNC: 1.9 MG/DL (ref 1.5–2.5)
MCH RBC QN AUTO: 32 PG (ref 27–33)
MCHC RBC AUTO-ENTMCNC: 34 G/DL (ref 33.7–35.3)
MCV RBC AUTO: 94.2 FL (ref 81.4–97.8)
MICRO URNS: ABNORMAL
NITRITE UR QL STRIP.AUTO: NEGATIVE
PH UR STRIP.AUTO: 7.5 [PH] (ref 5–8)
PLATELET # BLD AUTO: 246 K/UL (ref 164–446)
PMV BLD AUTO: 9.8 FL (ref 9–12.9)
POTASSIUM SERPL-SCNC: 5.1 MMOL/L (ref 3.6–5.5)
PROT UR QL STRIP: NEGATIVE MG/DL
PROTHROMBIN TIME: 25.4 SEC (ref 12–14.6)
RBC # BLD AUTO: 3.25 M/UL (ref 4.7–6.1)
RBC # URNS HPF: ABNORMAL /HPF
RBC UR QL AUTO: ABNORMAL
SODIUM SERPL-SCNC: 131 MMOL/L (ref 135–145)
SP GR UR STRIP.AUTO: 1.01
UROBILINOGEN UR STRIP.AUTO-MCNC: 0.2 MG/DL
WBC # BLD AUTO: 12.4 K/UL (ref 4.8–10.8)
WBC #/AREA URNS HPF: ABNORMAL /HPF

## 2020-05-12 PROCEDURE — 770020 HCHG ROOM/CARE - TELE (206)

## 2020-05-12 PROCEDURE — 81001 URINALYSIS AUTO W/SCOPE: CPT

## 2020-05-12 PROCEDURE — 700102 HCHG RX REV CODE 250 W/ 637 OVERRIDE(OP): Performed by: CLINICAL NURSE SPECIALIST

## 2020-05-12 PROCEDURE — 80048 BASIC METABOLIC PNL TOTAL CA: CPT

## 2020-05-12 PROCEDURE — 94669 MECHANICAL CHEST WALL OSCILL: CPT

## 2020-05-12 PROCEDURE — A9270 NON-COVERED ITEM OR SERVICE: HCPCS | Performed by: THORACIC SURGERY (CARDIOTHORACIC VASCULAR SURGERY)

## 2020-05-12 PROCEDURE — 83735 ASSAY OF MAGNESIUM: CPT

## 2020-05-12 PROCEDURE — 93005 ELECTROCARDIOGRAM TRACING: CPT | Performed by: THORACIC SURGERY (CARDIOTHORACIC VASCULAR SURGERY)

## 2020-05-12 PROCEDURE — 99024 POSTOP FOLLOW-UP VISIT: CPT | Performed by: THORACIC SURGERY (CARDIOTHORACIC VASCULAR SURGERY)

## 2020-05-12 PROCEDURE — 85027 COMPLETE CBC AUTOMATED: CPT

## 2020-05-12 PROCEDURE — 700111 HCHG RX REV CODE 636 W/ 250 OVERRIDE (IP): Performed by: CLINICAL NURSE SPECIALIST

## 2020-05-12 PROCEDURE — 700102 HCHG RX REV CODE 250 W/ 637 OVERRIDE(OP): Performed by: THORACIC SURGERY (CARDIOTHORACIC VASCULAR SURGERY)

## 2020-05-12 PROCEDURE — 94760 N-INVAS EAR/PLS OXIMETRY 1: CPT

## 2020-05-12 PROCEDURE — 700111 HCHG RX REV CODE 636 W/ 250 OVERRIDE (IP): Performed by: NURSE PRACTITIONER

## 2020-05-12 PROCEDURE — 93010 ELECTROCARDIOGRAM REPORT: CPT | Performed by: INTERNAL MEDICINE

## 2020-05-12 PROCEDURE — 700105 HCHG RX REV CODE 258: Performed by: NURSE PRACTITIONER

## 2020-05-12 PROCEDURE — 700102 HCHG RX REV CODE 250 W/ 637 OVERRIDE(OP): Performed by: NURSE PRACTITIONER

## 2020-05-12 PROCEDURE — 85610 PROTHROMBIN TIME: CPT

## 2020-05-12 PROCEDURE — A9270 NON-COVERED ITEM OR SERVICE: HCPCS | Performed by: CLINICAL NURSE SPECIALIST

## 2020-05-12 PROCEDURE — A9270 NON-COVERED ITEM OR SERVICE: HCPCS | Performed by: NURSE PRACTITIONER

## 2020-05-12 RX ORDER — AMIODARONE HYDROCHLORIDE 150 MG/3ML
150 INJECTION, SOLUTION INTRAVENOUS ONCE
Status: DISCONTINUED | OUTPATIENT
Start: 2020-05-12 | End: 2020-05-12 | Stop reason: CLARIF

## 2020-05-12 RX ORDER — AMIODARONE HYDROCHLORIDE 200 MG/1
400 TABLET ORAL TWICE DAILY
Status: DISCONTINUED | OUTPATIENT
Start: 2020-05-12 | End: 2020-05-14 | Stop reason: HOSPADM

## 2020-05-12 RX ORDER — AMIODARONE HYDROCHLORIDE 200 MG/1
400 TABLET ORAL 2 TIMES DAILY
Status: DISCONTINUED | OUTPATIENT
Start: 2020-05-12 | End: 2020-05-12

## 2020-05-12 RX ORDER — LISINOPRIL 5 MG/1
10 TABLET ORAL
Status: DISCONTINUED | OUTPATIENT
Start: 2020-05-12 | End: 2020-05-14 | Stop reason: HOSPADM

## 2020-05-12 RX ORDER — WARFARIN SODIUM 2 MG/1
2 TABLET ORAL DAILY
Status: DISCONTINUED | OUTPATIENT
Start: 2020-05-12 | End: 2020-05-13

## 2020-05-12 RX ADMIN — AMLODIPINE BESYLATE 10 MG: 10 TABLET ORAL at 05:21

## 2020-05-12 RX ADMIN — ACETAMINOPHEN 1000 MG: 500 TABLET ORAL at 05:21

## 2020-05-12 RX ADMIN — ASPIRIN 81 MG: 81 TABLET, COATED ORAL at 05:21

## 2020-05-12 RX ADMIN — ALLOPURINOL 200 MG: 100 TABLET ORAL at 05:20

## 2020-05-12 RX ADMIN — GABAPENTIN 100 MG: 100 CAPSULE ORAL at 05:22

## 2020-05-12 RX ADMIN — OXYCODONE HYDROCHLORIDE 10 MG: 10 TABLET, FILM COATED, EXTENDED RELEASE ORAL at 05:20

## 2020-05-12 RX ADMIN — GABAPENTIN 100 MG: 100 CAPSULE ORAL at 17:35

## 2020-05-12 RX ADMIN — GUAIFENESIN 600 MG: 600 TABLET, EXTENDED RELEASE ORAL at 17:35

## 2020-05-12 RX ADMIN — AMIODARONE HYDROCHLORIDE 150 MG: 1.5 INJECTION, SOLUTION INTRAVENOUS at 02:14

## 2020-05-12 RX ADMIN — AMIODARONE HYDROCHLORIDE 400 MG: 200 TABLET ORAL at 08:46

## 2020-05-12 RX ADMIN — ALLOPURINOL 200 MG: 100 TABLET ORAL at 17:35

## 2020-05-12 RX ADMIN — GUAIFENESIN 600 MG: 600 TABLET, EXTENDED RELEASE ORAL at 05:20

## 2020-05-12 RX ADMIN — ATORVASTATIN CALCIUM 5 MG: 10 TABLET, FILM COATED ORAL at 05:21

## 2020-05-12 RX ADMIN — AMIODARONE HYDROCHLORIDE 1 MG/MIN: 50 INJECTION, SOLUTION INTRAVENOUS at 10:38

## 2020-05-12 RX ADMIN — AMIODARONE HYDROCHLORIDE 400 MG: 200 TABLET ORAL at 17:35

## 2020-05-12 RX ADMIN — OXYCODONE HYDROCHLORIDE 10 MG: 10 TABLET, FILM COATED, EXTENDED RELEASE ORAL at 17:34

## 2020-05-12 RX ADMIN — METOPROLOL TARTRATE 25 MG: 25 TABLET, FILM COATED ORAL at 17:35

## 2020-05-12 RX ADMIN — AMIODARONE HYDROCHLORIDE 0.5 MG/MIN: 50 INJECTION, SOLUTION INTRAVENOUS at 20:02

## 2020-05-12 RX ADMIN — WARFARIN SODIUM 2 MG: 2 TABLET ORAL at 17:37

## 2020-05-12 RX ADMIN — OXYCODONE HYDROCHLORIDE 10 MG: 10 TABLET ORAL at 13:44

## 2020-05-12 RX ADMIN — METOPROLOL TARTRATE 12.5 MG: 25 TABLET, FILM COATED ORAL at 05:20

## 2020-05-12 RX ADMIN — LISINOPRIL 10 MG: 5 TABLET ORAL at 08:46

## 2020-05-12 ASSESSMENT — ENCOUNTER SYMPTOMS
PALPITATIONS: 1
RESPIRATORY NEGATIVE: 1
MYALGIAS: 1
EYES NEGATIVE: 1
NEUROLOGICAL NEGATIVE: 1
GASTROINTESTINAL NEGATIVE: 1
PSYCHIATRIC NEGATIVE: 1

## 2020-05-12 ASSESSMENT — FIBROSIS 4 INDEX: FIB4 SCORE: 1.45

## 2020-05-12 NOTE — PROGRESS NOTES
Spoke with Jennifer Jacinto regarding elevated potassium of 5.9. Orders to follow up with morning labs to see if potassium lowers. Will continue to monitor and reassess as needed.

## 2020-05-12 NOTE — PROGRESS NOTES
Cardiovascular Surgery Progress Note    Name: Sanjeev Malagon  MRN: [id number]  : 1965  Admit Date: 2020  5:27 AM  Procedure:  Procedure(s) and Anesthesia Type:     * REPLACEMENT, AORTIC VALVE - General     * ECHOCARDIOGRAM, TRANSESOPHAGEAL - General  5 Day Post-Op    Vitals:  Patient Vitals for the past 8 hrs:   Temp SpO2 O2 Delivery Device O2 (LPM) Pulse Resp BP Weight FiO2%   20 0810 36.8 °C (98.3 °F) 95 % None - Room Air 0 91 18 135/71 -- --   20 0708 -- 91 % None - Room Air 0 90 16 -- -- 21 %   20 0500 36.6 °C (97.9 °F) 96 % None - Room Air 0 91 18 146/84 -- --   20 0400 -- -- -- -- -- -- -- 88 kg (194 lb 0.1 oz) --     Temp (24hrs), Av.1 °C (98.7 °F), Min:36.6 °C (97.9 °F), Max:37.7 °C (99.8 °F)      Respiratory:    Respiration: 18, Pulse Oximetry: 95 %     Chest Tube Drains:          Fluids:    Intake/Output Summary (Last 24 hours) at 2020 0834  Last data filed at 2020 0525  Gross per 24 hour   Intake 340 ml   Output 3375 ml   Net -3035 ml     Admit weight: Weight: 88 kg (194 lb)  Current weight: Weight: 88 kg (194 lb 0.1 oz) (20 0400)    Labs:  Recent Labs     05/10/20  0335 20  0400 20  0345   WBC 13.0* 11.5* 12.4*   RBC 2.98* 3.12* 3.25*   HEMOGLOBIN 9.4* 9.9* 10.4*   HEMATOCRIT 28.9* 29.1* 30.6*   MCV 97.0 93.3 94.2   MCH 31.5 31.7 32.0   MCHC 32.5* 34.0 34.0   RDW 46.8 44.9 45.0   PLATELETCT 130* 177 246   MPV 10.7 10.4 9.8         Recent Labs     05/10/20  0335 20  0400 20  0345   SODIUM 129* 134* 131*   POTASSIUM 5.1 5.9* 5.1   CHLORIDE 97 100 98   CO2 20 23 22   GLUCOSE 104* 114* 112*   BUN 39* 14 11   CREATININE 1.68* 0.61 0.63   CALCIUM 8.3* 9.1 9.7     Recent Labs     05/10/20  0335 20  0400 20  034   INR 1.70* 2.74* 2.22*       Medications:  • amiodarone  400 mg     • metoprolol  25 mg     • lisinopril  10 mg     • gabapentin  100 mg     • amLODIPine  10 mg     • guaiFENesin ER  600 mg     •  atorvastatin  5 mg     • allopurinol  200 mg     • oxyCODONE CR  10 mg     • aspirin EC  81 mg     • Pharmacy Consult Request  1 Each     • acetaminophen  1,000 mg     • senna-docusate  2 Tab      And   • polyethylene glycol/lytes  1 Packet      And   • magnesium hydroxide  30 mL     • MD Alert...Warfarin per Pharmacy            Ordered Medications:    ASA - Yes    Plavix - No; contraindicated because of Other aortic valve    Post-operative Beta Blockers - Yes    Ace Inhibitor - Yes    Statin - Yes        Exam:   Review of Systems   Constitutional: Positive for malaise/fatigue.   HENT: Negative.    Eyes: Negative.    Respiratory: Negative.    Cardiovascular: Positive for palpitations.   Gastrointestinal: Negative.    Genitourinary: Negative.    Musculoskeletal: Positive for myalgias.   Skin: Negative.    Neurological: Negative.    Endo/Heme/Allergies: Negative.    Psychiatric/Behavioral: Negative.        Physical Exam  Constitutional:       General: He is not in acute distress.     Appearance: He is well-developed. He is not diaphoretic.   Neck:      Musculoskeletal: Neck supple.   Cardiovascular:      Rate and Rhythm: Tachycardia present. Rhythm irregular.      Heart sounds: Normal heart sounds. No murmur. No friction rub. No gallop.    Pulmonary:      Effort: Pulmonary effort is normal. No respiratory distress.      Breath sounds: Examination of the right-lower field reveals decreased breath sounds. Examination of the left-lower field reveals decreased breath sounds. Decreased breath sounds present. No wheezing or rales.   Abdominal:      General: There is no distension.      Palpations: Abdomen is soft.      Tenderness: There is no abdominal tenderness.   Musculoskeletal:      Right lower leg: No edema.      Left lower leg: No edema.   Skin:     General: Skin is warm and dry.   Neurological:      Mental Status: He is alert and oriented to person, place, and time.   Psychiatric:         Mood and Affect: Mood  normal.         Behavior: Behavior normal.         Thought Content: Thought content normal.         Judgment: Judgment normal.       Quality Measures:   Quality-Core Measures   Reviewed items::  EKG reviewed, Labs reviewed, Medications reviewed and Radiology images reviewed    Assessment/Plan:  POD 1 HTN- on cleviprex- starting home lisinopril, diurese, d/c mediastinal tubes, d/c dixon, pain - add toradol/oxycontin, transfer after off cleviprex  POD 2 HDS, SR, CR elevated/low UO- d/c lisinopril/toradol- replaced dixon 180 cc UO, BMP at 1200, amb, enc IS  POD 3 HDS BP improved with fluids, SR, D/c pacer wires, CR improved with fluids- good UO 1600 yesterday- nephrology was consulted- thanks for your input!, will d/c fluids today, ambulate  POD 4  HDS, SR/ST neuro intact, wounds CDI, abdomen S/NT, fluid balance negative, wt down. INR therapeutic.  Plan:  Low dose BB, ACE, Statin, IS/ambulate. CPM.  POD 5 HTN- add ace back now cr normal, Aflutter- - loaded with amio- start gtt/po amio, inc beta blocker, repeat UA, INR ther    Active Hospital Problems    Diagnosis   • Encounter for weaning from ventilator (HCC) [Z99.11]     Priority: High   • Aortic stenosis, severe [I35.0]     Priority: High   • History of SBE (subacute bacterial endocarditis) [Z86.79]     Priority: Medium   • Hypertension [I10]     Priority: Medium   • Acute blood loss as cause of postoperative anemia [D62]     Priority: Low   • Hyperkalemia [E87.5]     Priority: Low

## 2020-05-12 NOTE — PROGRESS NOTES
Patient sustaining with HR in 150s for about 20 minutes. Patient asymptomatic. VSS. EKG ordered. Jennifer Jacinto updated, ordered to bolus with amiodarone 150. Will continue to monitor.

## 2020-05-12 NOTE — DISCHARGE PLANNING
Anticipated Discharge Disposition:  Home    Action: PT/OT- (5/10) pt has no needs.   RN Nurse Navigator, Tahmina Robbins progress note states that pt to possibly d/c in a few days when medically cleared.   No known needs at this time.     Barriers to Discharge: None    Plan: LSW to assist as needed

## 2020-05-12 NOTE — PROGRESS NOTES
Monitor room called and notified this RN that the pt has been going in and out of A. Flutter earlier this morning. Monitor room to print and send strips for review.

## 2020-05-12 NOTE — PROGRESS NOTES
Cardiac Surgery RN Navigator Daily Rounding Note  Procedure Performed: Procedure(s) (LRB):  REPLACEMENT, AORTIC VALVE (N/A)  ECHOCARDIOGRAM, TRANSESOPHAGEAL (N/A)     By  Dr. Markham  5 Days Post-Op    Pertinent Overnight Events:    Murray removed-good UOP overnight    Started on metoprolol 12.5 mg and Norvasc 10 mg yesterday    In and out of a flutter early this am-amio bolus given. Currently in NSR    At baseline weight    INR is therapeutic    Neuro:  A&O: yes  Pain control needs: scheduled oxycontin 10 mg--one PRN of oxycodone 10 mg last night  Mobility: up self    Cardiac/hemodynamics:  Temp (24hrs), Av.1 °C (98.8 °F), Min:36.6 °C (97.9 °F), Max:37.7 °C (99.8 °F)    Heart rhythm: NSR to a flutter 100's to 120's  Temp:  [36.6 °C (97.9 °F)-37.7 °C (99.8 °F)] 36.6 °C (97.9 °F)  Pulse:  [] 90  Resp:  [15-18] 16  BP: (146-167)/() 146/84  SpO2:  [91 %-97 %] 91 %      IV gtts:    12-hour chest tube output: n/a    /Weights:  Admit weight: Weight: 88 kg (194 lb)  Current Weight: Weight: 88 kg (194 lb 0.1 oz) (20 0400)  Weight change: -2.8 kg (-6 lb 2.8 oz)  DOWN from yesterday's weight of 90.8 kg    Intake/Output Summary (Last 24 hours) at 2020 0814  Last data filed at 2020 0525  Gross per 24 hour   Intake 340 ml   Output 3375 ml   Net -3035 ml     12-hour urine output 675 cc  Since admit net I/O is -5.4 liters    Respiratory:      O2 needs: none RA  recent CXR: no  Incentive spirometry compliance: yes  POD #3  2 view CXR needed? no    GI:  Bowel movement: yes  Last BM:     Labs:  Recent Labs     05/10/20  0335 20  0400 20  0345   WBC 13.0* 11.5* 12.4*   RBC 2.98* 3.12* 3.25*   HEMOGLOBIN 9.4* 9.9* 10.4*   HEMATOCRIT 28.9* 29.1* 30.6*   MCV 97.0 93.3 94.2   MCH 31.5 31.7 32.0   MCHC 32.5* 34.0 34.0   RDW 46.8 44.9 45.0   PLATELETCT 130* 177 246   MPV 10.7 10.4 9.8     Recent Labs     05/10/20  0335 20  0400 20  0345   SODIUM 129* 134* 131*   POTASSIUM 5.1  5.9* 5.1   CHLORIDE 97 100 98   CO2 20 23 22   GLUCOSE 104* 114* 112*   BUN 39* 14 11   CREATININE 1.68* 0.61 0.63   CALCIUM 8.3* 9.1 9.7     INR:   Recent Labs     05/09/20  0233 05/10/20  0335 05/11/20  0400 05/12/20  0345   INR 1.13 1.70* 2.74* 2.22*       Medications:  ASA:  Yes  Plavix: No; contraindicated because of On Coumadin / NOAC  BB: Yes  Statin: yes  ACE/ARB: No; contraindicated because of Normal EF  Lasix: no    LDA's:  Central Line: yes  Murray: no  Chest tubes: no  Pacer Wires: no  Staples: no    Team's Goal for patient:    Convert or rate control a fib    Discharge plan:    Home in a few days once A flutter is converted or rate controlled

## 2020-05-12 NOTE — CARE PLAN
Problem: Communication  Goal: The ability to communicate needs accurately and effectively will improve  Outcome: PROGRESSING AS EXPECTED  Note: Patient oriented to room and surroundings. Patient educated to communicate needs accurately and effectively. Patient encouraged to vocalize questions and concerns regarding POC. No concerns or questions noted at this time.        Problem: Safety  Goal: Will remain free from falls  Outcome: PROGRESSING AS EXPECTED  Note: Patient will remain free from falls. Patient educated on importance of calling for assistance when assistance needed. Patient educated on tread socks, belongings within reach, and use of call light. Fall risk wristband on, bed alarm on. Bed in low and locked position. Fall precautions in place.       Problem: Knowledge Deficit  Goal: Knowledge of disease process/condition, treatment plan, diagnostic tests, and medications will improve  Outcome: PROGRESSING AS EXPECTED  Note: Patient updated on POC, medication education provided. Patient encouraged to voice all questions and concerns.       Problem: Post Op Day 4 CABG/Heart Valve Replacement  Goal: Optimal care of the Post Op CABG/Heart Valve replacement Post Op Day 4  Outcome: PROGRESSING AS EXPECTED  Note:   Patient educated on importance of s/p SAVR care such as ambulation, neuro checks, peripheral pulse checks, strict I&Os, daily weights. Patient educated to inform RN of any chest pain, numbness/tingling, or any change in status.

## 2020-05-12 NOTE — CARE PLAN
Problem: Post Op Day 4 CABG/Heart Valve Replacement  Goal: Optimal care of the Post Op CABG/Heart Valve replacement Post Op Day 4  Intervention: Daily Weights  Note: Daily weight obtained and charted        Problem: Post Op Day 4 CABG/Heart Valve Replacement  Goal: Optimal care of the Post Op CABG/Heart Valve replacement Post Op Day 4  Intervention: Shower daily and clean incisions twice daily with soap and water  Note: Pt showered and washed incision at sink      Problem: Post Op Day 4 CABG/Heart Valve Replacement  Goal: Optimal care of the Post Op CABG/Heart Valve replacement Post Op Day 4  Intervention: Up in chair for all meals  Note: Pt up to chair for all meals      Problem: Post Op Day 4 CABG/Heart Valve Replacement  Goal: Optimal care of the Post Op CABG/Heart Valve replacement Post Op Day 4  Intervention: Ambulate, increasing the distance each time x 3 and before bed  Note: Pt tolerating ambulating halls      Problem: Post Op Day 4 CABG/Heart Valve Replacement  Goal: Optimal care of the Post Op CABG/Heart Valve replacement Post Op Day 4  Intervention: IS q 1 hour while awake and record best IS volume  Note: Pt encouraged to continue to use IS, best of the day 2000

## 2020-05-12 NOTE — PROGRESS NOTES
Bedside report received. Per night RN patient's mentation started to wax and wane with intermittent confusion. Patient is currently  A&O x 4. ST low 100's on telemetry monitor. RA diminished at bases. Patient reports good urine output post dixon removal overnight. Complains of minimal pain at incision site will medicate per MAR. POC discussed with patient. Pt verbalizes understanding. Call light and belongings with in reach. Bed locked and in lowest position, alarm and fall precautions in place.

## 2020-05-12 NOTE — PROGRESS NOTES
Monitor Summary:    Rhythm: ST  Rate: 101-128, up to 150s  Ectopy: rare PVC  Intervals: .12/.08/.36

## 2020-05-12 NOTE — PROGRESS NOTES
Report received. Patient A&O x 4. Reports no pain.  VS Stable. Patient sitting in chair comfortably. POC discussed, patient verbalized understanding. Belongings and bedside table within reach. Bed in low and locked positions. Fall precautions in place. Patient educated to call for assistance. Hourly rounding in place. No other needs at this time.

## 2020-05-13 ENCOUNTER — TELEPHONE (OUTPATIENT)
Dept: CARDIOTHORACIC SURGERY | Facility: MEDICAL CENTER | Age: 55
End: 2020-05-13

## 2020-05-13 LAB
ANION GAP SERPL CALC-SCNC: 13 MMOL/L (ref 7–16)
BUN SERPL-MCNC: 13 MG/DL (ref 8–22)
CALCIUM SERPL-MCNC: 9.5 MG/DL (ref 8.5–10.5)
CHLORIDE SERPL-SCNC: 99 MMOL/L (ref 96–112)
CO2 SERPL-SCNC: 21 MMOL/L (ref 20–33)
CREAT SERPL-MCNC: 0.67 MG/DL (ref 0.5–1.4)
EKG IMPRESSION: NORMAL
ERYTHROCYTE [DISTWIDTH] IN BLOOD BY AUTOMATED COUNT: 44.1 FL (ref 35.9–50)
GLUCOSE BLD-MCNC: 95 MG/DL (ref 65–99)
GLUCOSE SERPL-MCNC: 111 MG/DL (ref 65–99)
HCT VFR BLD AUTO: 30.8 % (ref 42–52)
HGB BLD-MCNC: 10.4 G/DL (ref 14–18)
INR PPP: 2.03 (ref 0.87–1.13)
MCH RBC QN AUTO: 31.3 PG (ref 27–33)
MCHC RBC AUTO-ENTMCNC: 33.8 G/DL (ref 33.7–35.3)
MCV RBC AUTO: 92.8 FL (ref 81.4–97.8)
PLATELET # BLD AUTO: 284 K/UL (ref 164–446)
PMV BLD AUTO: 9.6 FL (ref 9–12.9)
POTASSIUM SERPL-SCNC: 4.8 MMOL/L (ref 3.6–5.5)
PROTHROMBIN TIME: 23.6 SEC (ref 12–14.6)
RBC # BLD AUTO: 3.32 M/UL (ref 4.7–6.1)
SODIUM SERPL-SCNC: 133 MMOL/L (ref 135–145)
WBC # BLD AUTO: 11.3 K/UL (ref 4.8–10.8)

## 2020-05-13 PROCEDURE — 700102 HCHG RX REV CODE 250 W/ 637 OVERRIDE(OP): Performed by: CLINICAL NURSE SPECIALIST

## 2020-05-13 PROCEDURE — 700102 HCHG RX REV CODE 250 W/ 637 OVERRIDE(OP): Performed by: NURSE PRACTITIONER

## 2020-05-13 PROCEDURE — 770020 HCHG ROOM/CARE - TELE (206)

## 2020-05-13 PROCEDURE — 85027 COMPLETE CBC AUTOMATED: CPT

## 2020-05-13 PROCEDURE — 85610 PROTHROMBIN TIME: CPT

## 2020-05-13 PROCEDURE — 93005 ELECTROCARDIOGRAM TRACING: CPT | Performed by: NURSE PRACTITIONER

## 2020-05-13 PROCEDURE — 99024 POSTOP FOLLOW-UP VISIT: CPT | Performed by: THORACIC SURGERY (CARDIOTHORACIC VASCULAR SURGERY)

## 2020-05-13 PROCEDURE — 700102 HCHG RX REV CODE 250 W/ 637 OVERRIDE(OP): Performed by: THORACIC SURGERY (CARDIOTHORACIC VASCULAR SURGERY)

## 2020-05-13 PROCEDURE — A9270 NON-COVERED ITEM OR SERVICE: HCPCS | Performed by: THORACIC SURGERY (CARDIOTHORACIC VASCULAR SURGERY)

## 2020-05-13 PROCEDURE — A9270 NON-COVERED ITEM OR SERVICE: HCPCS | Performed by: CLINICAL NURSE SPECIALIST

## 2020-05-13 PROCEDURE — A9270 NON-COVERED ITEM OR SERVICE: HCPCS | Performed by: NURSE PRACTITIONER

## 2020-05-13 PROCEDURE — 80048 BASIC METABOLIC PNL TOTAL CA: CPT

## 2020-05-13 PROCEDURE — 93010 ELECTROCARDIOGRAM REPORT: CPT | Performed by: INTERNAL MEDICINE

## 2020-05-13 PROCEDURE — 82962 GLUCOSE BLOOD TEST: CPT

## 2020-05-13 RX ORDER — WARFARIN SODIUM 4 MG/1
4 TABLET ORAL DAILY
Status: DISCONTINUED | OUTPATIENT
Start: 2020-05-13 | End: 2020-05-14

## 2020-05-13 RX ADMIN — GUAIFENESIN 600 MG: 600 TABLET, EXTENDED RELEASE ORAL at 04:52

## 2020-05-13 RX ADMIN — AMLODIPINE BESYLATE 10 MG: 10 TABLET ORAL at 04:54

## 2020-05-13 RX ADMIN — HYDRALAZINE HYDROCHLORIDE 25 MG: 25 TABLET, FILM COATED ORAL at 00:29

## 2020-05-13 RX ADMIN — OXYCODONE HYDROCHLORIDE 10 MG: 10 TABLET, FILM COATED, EXTENDED RELEASE ORAL at 04:53

## 2020-05-13 RX ADMIN — AMIODARONE HYDROCHLORIDE 400 MG: 200 TABLET ORAL at 04:51

## 2020-05-13 RX ADMIN — METOPROLOL TARTRATE 25 MG: 25 TABLET, FILM COATED ORAL at 16:35

## 2020-05-13 RX ADMIN — ATORVASTATIN CALCIUM 5 MG: 10 TABLET, FILM COATED ORAL at 04:54

## 2020-05-13 RX ADMIN — GUAIFENESIN 600 MG: 600 TABLET, EXTENDED RELEASE ORAL at 16:34

## 2020-05-13 RX ADMIN — AMIODARONE HYDROCHLORIDE 400 MG: 200 TABLET ORAL at 16:34

## 2020-05-13 RX ADMIN — GABAPENTIN 100 MG: 100 CAPSULE ORAL at 16:34

## 2020-05-13 RX ADMIN — METOPROLOL TARTRATE 25 MG: 25 TABLET, FILM COATED ORAL at 04:50

## 2020-05-13 RX ADMIN — OXYCODONE HYDROCHLORIDE 10 MG: 10 TABLET, FILM COATED, EXTENDED RELEASE ORAL at 16:35

## 2020-05-13 RX ADMIN — SENNOSIDES AND DOCUSATE SODIUM 2 TABLET: 8.6; 5 TABLET ORAL at 04:52

## 2020-05-13 RX ADMIN — ASPIRIN 81 MG: 81 TABLET, COATED ORAL at 04:50

## 2020-05-13 RX ADMIN — LISINOPRIL 10 MG: 5 TABLET ORAL at 04:51

## 2020-05-13 RX ADMIN — ALLOPURINOL 200 MG: 100 TABLET ORAL at 16:35

## 2020-05-13 RX ADMIN — WARFARIN SODIUM 4 MG: 4 TABLET ORAL at 16:34

## 2020-05-13 RX ADMIN — GABAPENTIN 100 MG: 100 CAPSULE ORAL at 04:55

## 2020-05-13 RX ADMIN — ALLOPURINOL 200 MG: 100 TABLET ORAL at 04:52

## 2020-05-13 ASSESSMENT — ENCOUNTER SYMPTOMS
GASTROINTESTINAL NEGATIVE: 1
PSYCHIATRIC NEGATIVE: 1
EYES NEGATIVE: 1
MYALGIAS: 1
NEUROLOGICAL NEGATIVE: 1
RESPIRATORY NEGATIVE: 1

## 2020-05-13 ASSESSMENT — FIBROSIS 4 INDEX: FIB4 SCORE: 0.9

## 2020-05-13 NOTE — TELEPHONE ENCOUNTER
"Patient's wife mayra called with concerns that occasionally Daniel says things on the phone with her that don't quite make sense such as he's cold and \"they're fixing the popcorn in the heating system\".    I explained phenomenon of \"pump head\" and that there can be some post surgery perfusion disturbances that can manifest with some cognitive abnormalities and they are usually transient.  I explained that as long as the rest of his faculties are present, motor, speech, memory recall, etc are present and these 'disturbances' are self limiting to just monitor them.    He is inpatient under nursing care at the moment and no calls have been received regarding cognitive issues.  She was relieved by this and had no more questions    Call time 6 minutes.  "

## 2020-05-13 NOTE — PROGRESS NOTES
Inpatient Anticoagulation Service Note    Date: 5/13/2020    Reason for Anticoagulation: Bioprosthetic Valve Replacement(AVR)   Target INR: 2.0 to 3.0         Hemoglobin Value: (!) 10.4  Hematocrit Value: (!) 30.8  Lab Platelet Value: 284    INR from last 7 days     Date/Time INR Value    05/13/20 0349  (!) 2.03    05/12/20 0345  (!) 2.22    05/11/20 0400  (!) 2.74    05/10/20 0335  (!) 1.7    05/09/20 0233  1.13    05/08/20 0500  1.13    05/07/20 1057  (!) 1.28        Dose from last 7 days     Date/Time Dose (mg)    05/13/20 1345  4    05/12/20 1704  2    05/11/20 1546  0    05/10/20 1642  2.5    05/09/20 1505  5    05/08/20 1334  7.5        Average Dose (mg): (New start)  Significant Interactions: Amiodarone, Aspirin, Statin, Other (Comments)(allopurinol)  Bridge Therapy: No     Comments: INR down to 2.03 after held warfarin 2 days ago and warfarin 2 mg yesterday. Pt's DDI with warfarin are amiodarone, ASA, allopurinol, and statin. Per CTS note, amio to be dc'd. Will start warfarin 4 mg daily and trend the INR.    Education Material Provided?: No  Pharmacist suggested discharge dosing: Warfarin 4 mg daily and pt will need a follow up INR within 3- 5 days of discharge     Sharif Maradiaga, NatanaelD

## 2020-05-13 NOTE — CARE PLAN
Problem: Post Op Day 4 CABG/Heart Valve Replacement  Goal: Optimal care of the Post Op CABG/Heart Valve replacement Post Op Day 4  Outcome: PROGRESSING AS EXPECTED  Intervention: Daily Weights  Note: Daily weights obtained in AM and charted   Intervention: Shower daily and clean incisions twice daily with soap and water  Note: Pt showered during the day and cleansed incision site with soap and water once during day and once before bed.  Intervention: Up in chair for all meals  Note: Pt up in chair for all meals   Intervention: Ambulate, increasing the distance each time x 3 and before bed  Note: Pt ambulated in hallway 500 ft and ambulated 3x during the day   Intervention: IS q 1 hour while awake and record best IS volume  Note: Best IS 2000  Intervention: Consider removal of dixon, chest tube and pacer wire if not already done  Note: Completed

## 2020-05-13 NOTE — PROGRESS NOTES
Bedside report received. No overnight events per night RN. Patient A&O x 4. VS'S. RA. Pt back in NSR with rare PAC's. No complaints of pain or SOB at this time. POC discussed with patient. Pt verbalizes understanding. Call light and belongings with in reach. Bed locked and in lowest position, alarm and fall precautions in place.

## 2020-05-13 NOTE — PROGRESS NOTES
Patient care assumed, bedside report received, POC discussed, verbalizes understanding. Safety measures in place, call light in place. Pt is A&Ox4 at this time with no complaints of pain. Pt is sitting in chair resting comfortably

## 2020-05-13 NOTE — CARE PLAN
Problem: Post Op Day 4 CABG/Heart Valve Replacement  Goal: Optimal care of the Post Op CABG/Heart Valve replacement Post Op Day 4  Intervention: Daily Weights  Note: Daily weight obtained and charted      Problem: Post Op Day 4 CABG/Heart Valve Replacement  Goal: Optimal care of the Post Op CABG/Heart Valve replacement Post Op Day 4  Intervention: Shower daily and clean incisions twice daily with soap and water  Note: Pt showered and washed incision at sink      Problem: Post Op Day 4 CABG/Heart Valve Replacement  Goal: Optimal care of the Post Op CABG/Heart Valve replacement Post Op Day 4  Intervention: Up in chair for all meals  Note: Pt up to chair for all meals      Problem: Post Op Day 4 CABG/Heart Valve Replacement  Goal: Optimal care of the Post Op CABG/Heart Valve replacement Post Op Day 4  Intervention: Ambulate, increasing the distance each time x 3 and before bed  Note: Pt tolerating ambulating halls 500 ft X 3      Problem: Post Op Day 4 CABG/Heart Valve Replacement  Goal: Optimal care of the Post Op CABG/Heart Valve replacement Post Op Day 4  Intervention: IS q 1 hour while awake and record best IS volume  Note: Pt has been pulling 2000 in the IS

## 2020-05-13 NOTE — PROGRESS NOTES
Cardiac Surgery RN Navigator Daily Rounding Note  Procedure Performed: Procedure(s) (LRB):  REPLACEMENT, AORTIC VALVE (N/A)  ECHOCARDIOGRAM, TRANSESOPHAGEAL (N/A)     By  Dr. Markham  6 Days Post-Op    Pertinent Overnight Events:    Patient converted out of a fib overnight  EKG obtained to confirm and for suspected 1st degree AV block  UA negative      Neuro:  A&O: yes  Pain control needs: yes  Mobility: up self    Cardiac/hemodynamics:  Temp (24hrs), Av.6 °C (97.9 °F), Min:36.2 °C (97.1 °F), Max:36.9 °C (98.4 °F)    Heart rhythm: NSR with 1st degree AV block  Temp:  [36.2 °C (97.1 °F)-36.9 °C (98.4 °F)] 36.5 °C (97.7 °F)  Pulse:  [] 86  Resp:  [16-18] 18  BP: (128-168)/(74-88) 128/81  SpO2:  [90 %-95 %] 95 %      IV gtts:    12-hour chest tube output: n/a    /Weights:  Admit weight: Weight: 88 kg (194 lb)  Current Weight: Weight: 86.3 kg (190 lb 4.1 oz) (20 0400)  Weight change: -1.7 kg (-3 lb 12 oz)  DOWN from yesterday's weight of 88 kg    Intake/Output Summary (Last 24 hours) at 2020 1001  Last data filed at 2020 0600  Gross per 24 hour   Intake 810 ml   Output 1700 ml   Net -890 ml     12-hour urine output 1300 cc  Since admit net I/O is -6.4 liters    Respiratory:        O2 needs: none  recent CXR: no  Incentive spirometry compliance: yes  POD #3  2 view CXR needed? no    GI:  Bowel movement: yes  Last BM:     Labs:  Recent Labs     20  0400 20  0345 20  0349   WBC 11.5* 12.4* 11.3*   RBC 3.12* 3.25* 3.32*   HEMOGLOBIN 9.9* 10.4* 10.4*   HEMATOCRIT 29.1* 30.6* 30.8*   MCV 93.3 94.2 92.8   MCH 31.7 32.0 31.3   MCHC 34.0 34.0 33.8   RDW 44.9 45.0 44.1   PLATELETCT 177 246 284   MPV 10.4 9.8 9.6     Recent Labs     20  0400 20  0345 20  0349   SODIUM 134* 131* 133*   POTASSIUM 5.9* 5.1 4.8   CHLORIDE 100 98 99   CO2 23 22 21   GLUCOSE 114* 112* 111*   BUN 14 11 13   CREATININE 0.61 0.63 0.67   CALCIUM 9.1 9.7 9.5     INR:   Recent Labs      05/10/20  0335 05/11/20  0400 05/12/20  0345 05/13/20  0349   INR 1.70* 2.74* 2.22* 2.03*       Medications:  ASA:  Yes  Plavix: No; contraindicated because of On Coumadin / NOAC  BB: Yes  Statin: yes  ACE/ARB: Yes  Lasix: yes    LDA's:  Central Line: yes  Murray: no  Chest tubes: no  Pacer Wires: no  Staples: no    Team's Goal for patient:    Ambulate    IS    Monitor rhythm to ensure he stays in NSR    Discharge plan:    Home tomorrow.  He and his wife will be happy to go to a clinic for lab draws; no HH needed just anticoag referral and scripts

## 2020-05-13 NOTE — PROGRESS NOTES
Cardiovascular Surgery Progress Note    Name: Sanjeev Malagon  MRN: [id number]  : 1965  Admit Date: 2020  5:27 AM  Procedure:  Procedure(s) and Anesthesia Type:     * REPLACEMENT, AORTIC VALVE - General     * ECHOCARDIOGRAM, TRANSESOPHAGEAL - General  6 Day Post-Op    Vitals:  Patient Vitals for the past 8 hrs:   Temp SpO2 O2 Delivery Device O2 (LPM) Pulse Resp BP Weight   20 0838 36.5 °C (97.7 °F) 95 % None - Room Air 0 86 18 128/81 --   20 0440 36.9 °C (98.4 °F) 94 % None - Room Air -- 86 16 142/84 --   20 0400 -- -- -- -- -- -- -- 86.3 kg (190 lb 4.1 oz)     Temp (24hrs), Av.6 °C (97.9 °F), Min:36.2 °C (97.1 °F), Max:36.9 °C (98.4 °F)      Respiratory:    Respiration: 18, Pulse Oximetry: 95 %     Chest Tube Drains:          Fluids:    Intake/Output Summary (Last 24 hours) at 2020 0947  Last data filed at 2020 0600  Gross per 24 hour   Intake 810 ml   Output 2000 ml   Net -1190 ml     Admit weight: Weight: 88 kg (194 lb)  Current weight: Weight: 86.3 kg (190 lb 4.1 oz) (20 0400)    Labs:  Recent Labs     20  0400 20  03420  034   WBC 11.5* 12.4* 11.3*   RBC 3.12* 3.25* 3.32*   HEMOGLOBIN 9.9* 10.4* 10.4*   HEMATOCRIT 29.1* 30.6* 30.8*   MCV 93.3 94.2 92.8   MCH 31.7 32.0 31.3   MCHC 34.0 34.0 33.8   RDW 44.9 45.0 44.1   PLATELETCT 177 246 284   MPV 10.4 9.8 9.6         Recent Labs     20  0400 20  0345 20  034   SODIUM 134* 131* 133*   POTASSIUM 5.9* 5.1 4.8   CHLORIDE 100 98 99   CO2 23 22 21   GLUCOSE 114* 112* 111*   BUN 14 11 13   CREATININE 0.61 0.63 0.67   CALCIUM 9.1 9.7 9.5     Recent Labs     20  0400 20  0345 20   INR 2.74* 2.22* 2.03*       Medications:  • amiodarone  400 mg     • metoprolol  25 mg     • lisinopril  10 mg     • warfarin  2 mg     • gabapentin  100 mg     • amLODIPine  10 mg     • guaiFENesin ER  600 mg     • atorvastatin  5 mg     • allopurinol  200 mg     •  oxyCODONE CR  10 mg     • aspirin EC  81 mg     • Pharmacy Consult Request  1 Each     • senna-docusate  2 Tab      And   • polyethylene glycol/lytes  1 Packet      And   • magnesium hydroxide  30 mL     • MD Alert...Warfarin per Pharmacy            Ordered Medications:    ASA - Yes    Plavix - No; contraindicated because of Other aortic valve    Post-operative Beta Blockers - Yes    Ace Inhibitor - Yes    Statin - Yes        Exam:   Review of Systems   Constitutional: Positive for malaise/fatigue.   HENT: Negative.    Eyes: Negative.    Respiratory: Negative.    Gastrointestinal: Negative.    Genitourinary: Negative.    Musculoskeletal: Positive for myalgias.   Skin: Negative.    Neurological: Negative.    Endo/Heme/Allergies: Negative.    Psychiatric/Behavioral: Negative.        Physical Exam  Constitutional:       General: He is not in acute distress.     Appearance: He is well-developed. He is not diaphoretic.   Neck:      Musculoskeletal: Neck supple.   Cardiovascular:      Rate and Rhythm: Tachycardia present. Rhythm irregular.      Heart sounds: Normal heart sounds. No murmur. No friction rub. No gallop.    Pulmonary:      Effort: Pulmonary effort is normal. No respiratory distress.      Breath sounds: Examination of the right-lower field reveals decreased breath sounds. Examination of the left-lower field reveals decreased breath sounds. Decreased breath sounds present. No wheezing or rales.   Abdominal:      General: There is no distension.      Palpations: Abdomen is soft.      Tenderness: There is no abdominal tenderness.   Musculoskeletal:      Right lower leg: No edema.      Left lower leg: No edema.   Skin:     General: Skin is warm and dry.   Neurological:      Mental Status: He is alert and oriented to person, place, and time.   Psychiatric:         Mood and Affect: Mood normal.         Behavior: Behavior normal.         Thought Content: Thought content normal.         Judgment: Judgment normal.        Quality Measures:   Quality-Core Measures   Reviewed items::  EKG reviewed, Labs reviewed, Medications reviewed and Radiology images reviewed    Assessment/Plan:  POD 1 HTN- on cleviprex- starting home lisinopril, diurese, d/c mediastinal tubes, d/c dixon, pain - add toradol/oxycontin, transfer after off cleviprex  POD 2 HDS, SR, CR elevated/low UO- d/c lisinopril/toradol- replaced dixon 180 cc UO, BMP at 1200, amb, enc IS  POD 3 HDS BP improved with fluids, SR, D/c pacer wires, CR improved with fluids- good UO 1600 yesterday- nephrology was consulted- thanks for your input!, will d/c fluids today, ambulate  POD 4  HDS, SR/ST neuro intact, wounds CDI, abdomen S/NT, fluid balance negative, wt down. INR therapeutic.  Plan:  Low dose BB, ACE, Statin, IS/ambulate. CPM.  POD 5 HTN- add ace back now cr normal, Aflutter- - loaded with amio- start gtt/po amio, inc beta blocker, repeat UA, INR there  POD 6 HDS, SR- d/c amio- cont PO, UA negative, planning home in am if rhythm stable    Active Hospital Problems    Diagnosis   • Encounter for weaning from ventilator (HCC) [Z99.11]     Priority: High   • Aortic stenosis, severe [I35.0]     Priority: High   • History of SBE (subacute bacterial endocarditis) [Z86.79]     Priority: Medium   • Hypertension [I10]     Priority: Medium   • Acute blood loss as cause of postoperative anemia [D62]     Priority: Low   • Hyperkalemia [E87.5]     Priority: Low

## 2020-05-13 NOTE — PROGRESS NOTES
Pharmacy Warfarin Monitoring     Date: 5/12/2020  Reason for Anticoagulation: Bioprosthetic Valve Replacement - AVR  Target INR: 2.0 to 3.0     Hemoglobin Value: (!) 10.4  Hematocrit Value: (!) 30.6  Lab Platelet Value: 246    INR from last 7 days     Date/Time INR Value    05/12/20 0345  (!) 2.22    05/11/20 0400  (!) 2.74    05/10/20 0335  (!) 1.7    05/09/20 0233  1.13    05/08/20 0500  1.13    05/07/20 1057  (!) 1.28    05/06/20 1105  0.95        Dose from last 7 days     Date/Time Dose (mg)    05/12/20 1704  2    05/11/20 1546  0    05/10/20 1642  2.5    05/09/20 1505  5    05/08/20 1334  7.5        Home Dose: New start  Significant Interactions: Aspirin, Statin, Amiodarone  Bridge Therapy: Not indicated, currently therapeutic    Reversal Agent Administered: Not Applicable    Comments: New start warfarin for AVR.  No concerns for bleeding noted.  INR therapeutic, large swings to INR demonstrate sensitivity to warfarin.  Amiodarone started today.  Will resume decreased dose of 2 mg daily.  Pharmacy to follow daily during admission.    Education Material Provided?: No  Pharmacist suggested discharge dosing: TBD pending response to warfarin initiation, suggest 2 mg daily.  Recommend INR within 72 hours of discharge.     Thank you!  Grace Boss, PharmD, BCCCP

## 2020-05-14 VITALS
RESPIRATION RATE: 16 BRPM | TEMPERATURE: 98.7 F | HEART RATE: 77 BPM | HEIGHT: 69 IN | SYSTOLIC BLOOD PRESSURE: 101 MMHG | WEIGHT: 186.73 LBS | BODY MASS INDEX: 27.66 KG/M2 | DIASTOLIC BLOOD PRESSURE: 66 MMHG | OXYGEN SATURATION: 98 %

## 2020-05-14 PROBLEM — D62 ACUTE BLOOD LOSS AS CAUSE OF POSTOPERATIVE ANEMIA: Status: RESOLVED | Noted: 2020-05-07 | Resolved: 2020-05-14

## 2020-05-14 PROBLEM — Z99.11 ENCOUNTER FOR WEANING FROM VENTILATOR (HCC): Status: RESOLVED | Noted: 2020-05-07 | Resolved: 2020-05-14

## 2020-05-14 PROBLEM — I35.0 AORTIC STENOSIS, SEVERE: Status: RESOLVED | Noted: 2020-03-31 | Resolved: 2020-05-14

## 2020-05-14 PROBLEM — E87.5 HYPERKALEMIA: Status: RESOLVED | Noted: 2020-05-07 | Resolved: 2020-05-14

## 2020-05-14 LAB
ANION GAP SERPL CALC-SCNC: 12 MMOL/L (ref 7–16)
BUN SERPL-MCNC: 16 MG/DL (ref 8–22)
CALCIUM SERPL-MCNC: 9.6 MG/DL (ref 8.5–10.5)
CHLORIDE SERPL-SCNC: 100 MMOL/L (ref 96–112)
CO2 SERPL-SCNC: 21 MMOL/L (ref 20–33)
CREAT SERPL-MCNC: 0.8 MG/DL (ref 0.5–1.4)
ERYTHROCYTE [DISTWIDTH] IN BLOOD BY AUTOMATED COUNT: 44.3 FL (ref 35.9–50)
GLUCOSE BLD-MCNC: 101 MG/DL (ref 65–99)
GLUCOSE SERPL-MCNC: 104 MG/DL (ref 65–99)
HCT VFR BLD AUTO: 32 % (ref 42–52)
HGB BLD-MCNC: 10.6 G/DL (ref 14–18)
INR PPP: 2.38 (ref 0.87–1.13)
MCH RBC QN AUTO: 31 PG (ref 27–33)
MCHC RBC AUTO-ENTMCNC: 33.1 G/DL (ref 33.7–35.3)
MCV RBC AUTO: 93.6 FL (ref 81.4–97.8)
PLATELET # BLD AUTO: 364 K/UL (ref 164–446)
PMV BLD AUTO: 9.6 FL (ref 9–12.9)
POTASSIUM SERPL-SCNC: 5 MMOL/L (ref 3.6–5.5)
PROTHROMBIN TIME: 26.8 SEC (ref 12–14.6)
RBC # BLD AUTO: 3.42 M/UL (ref 4.7–6.1)
SODIUM SERPL-SCNC: 133 MMOL/L (ref 135–145)
WBC # BLD AUTO: 11.1 K/UL (ref 4.8–10.8)

## 2020-05-14 PROCEDURE — 99024 POSTOP FOLLOW-UP VISIT: CPT | Performed by: THORACIC SURGERY (CARDIOTHORACIC VASCULAR SURGERY)

## 2020-05-14 PROCEDURE — 700102 HCHG RX REV CODE 250 W/ 637 OVERRIDE(OP): Performed by: CLINICAL NURSE SPECIALIST

## 2020-05-14 PROCEDURE — 85610 PROTHROMBIN TIME: CPT

## 2020-05-14 PROCEDURE — A9270 NON-COVERED ITEM OR SERVICE: HCPCS | Performed by: NURSE PRACTITIONER

## 2020-05-14 PROCEDURE — A9270 NON-COVERED ITEM OR SERVICE: HCPCS | Performed by: THORACIC SURGERY (CARDIOTHORACIC VASCULAR SURGERY)

## 2020-05-14 PROCEDURE — 80048 BASIC METABOLIC PNL TOTAL CA: CPT

## 2020-05-14 PROCEDURE — 82962 GLUCOSE BLOOD TEST: CPT

## 2020-05-14 PROCEDURE — 700102 HCHG RX REV CODE 250 W/ 637 OVERRIDE(OP): Performed by: NURSE PRACTITIONER

## 2020-05-14 PROCEDURE — 85027 COMPLETE CBC AUTOMATED: CPT

## 2020-05-14 PROCEDURE — A9270 NON-COVERED ITEM OR SERVICE: HCPCS | Performed by: CLINICAL NURSE SPECIALIST

## 2020-05-14 PROCEDURE — 700102 HCHG RX REV CODE 250 W/ 637 OVERRIDE(OP): Performed by: THORACIC SURGERY (CARDIOTHORACIC VASCULAR SURGERY)

## 2020-05-14 RX ORDER — HYDROCODONE BITARTRATE AND ACETAMINOPHEN 10; 325 MG/1; MG/1
1 TABLET ORAL EVERY 6 HOURS PRN
Qty: 56 TAB | Refills: 0 | Status: SHIPPED | OUTPATIENT
Start: 2020-05-14 | End: 2020-05-28

## 2020-05-14 RX ORDER — AMIODARONE HYDROCHLORIDE 400 MG/1
400 TABLET ORAL 2 TIMES DAILY
Qty: 56 TAB | Refills: 1 | Status: SHIPPED | OUTPATIENT
Start: 2020-05-14 | End: 2020-05-28

## 2020-05-14 RX ORDER — AMLODIPINE BESYLATE 10 MG/1
10 TABLET ORAL DAILY
Qty: 30 TAB | Refills: 1 | Status: SHIPPED | OUTPATIENT
Start: 2020-05-15 | End: 2020-07-13

## 2020-05-14 RX ADMIN — METOPROLOL TARTRATE 25 MG: 25 TABLET, FILM COATED ORAL at 04:43

## 2020-05-14 RX ADMIN — SENNOSIDES AND DOCUSATE SODIUM 2 TABLET: 8.6; 5 TABLET ORAL at 04:39

## 2020-05-14 RX ADMIN — OXYCODONE HYDROCHLORIDE 10 MG: 10 TABLET ORAL at 00:25

## 2020-05-14 RX ADMIN — AMLODIPINE BESYLATE 10 MG: 10 TABLET ORAL at 04:43

## 2020-05-14 RX ADMIN — LISINOPRIL 10 MG: 5 TABLET ORAL at 04:38

## 2020-05-14 RX ADMIN — ASPIRIN 81 MG: 81 TABLET, COATED ORAL at 04:40

## 2020-05-14 RX ADMIN — GABAPENTIN 100 MG: 100 CAPSULE ORAL at 04:42

## 2020-05-14 RX ADMIN — GUAIFENESIN 600 MG: 600 TABLET, EXTENDED RELEASE ORAL at 04:38

## 2020-05-14 RX ADMIN — OXYCODONE HYDROCHLORIDE 10 MG: 10 TABLET, FILM COATED, EXTENDED RELEASE ORAL at 04:40

## 2020-05-14 RX ADMIN — ALLOPURINOL 200 MG: 100 TABLET ORAL at 04:42

## 2020-05-14 RX ADMIN — AMIODARONE HYDROCHLORIDE 400 MG: 200 TABLET ORAL at 04:39

## 2020-05-14 RX ADMIN — ATORVASTATIN CALCIUM 5 MG: 10 TABLET, FILM COATED ORAL at 04:42

## 2020-05-14 ASSESSMENT — FIBROSIS 4 INDEX: FIB4 SCORE: 0.7

## 2020-05-14 NOTE — PROGRESS NOTES
Bedside report received. POC discussed with pt; all questions answered at this time. Pt sitting edge of bed and ambulated to the bathroom. Pt denies any other needs at this time.

## 2020-05-14 NOTE — PROGRESS NOTES
Cardiac Surgery RN Navigator Daily Rounding Note  Procedure Performed: Procedure(s) (LRB):  REPLACEMENT, AORTIC VALVE (N/A)  ECHOCARDIOGRAM, TRANSESOPHAGEAL (N/A)     By  Dr. Markham  7 Days Post-Op    Pertinent Overnight Events:    No overnight events    Below admit weight    Remains in NSR    Neuro:  A&O: yes  Pain control needs: Oxy 10 mg PRN and Oxycontin 10 mg Q 12 hours scheduled  Mobility: up self    Cardiac/hemodynamics:  Temp (24hrs), Av.9 °C (98.5 °F), Min:36.3 °C (97.3 °F), Max:37.8 °C (100.1 °F)    Heart rhythm: NSR with 1st degree block  Temp:  [36.3 °C (97.3 °F)-37.8 °C (100.1 °F)] 37.1 °C (98.7 °F)  Pulse:  [77-91] 77  Resp:  [16-18] 16  BP: (101-143)/(66-89) 101/66  SpO2:  [94 %-98 %] 98 %      IV gtts:    12-hour chest tube output: n/a    /Weights:  Admit weight: Weight: 88 kg (194 lb)  Current Weight: Weight: 84.7 kg (186 lb 11.7 oz) (20 0400)  Weight change: -1.6 kg (-3 lb 8.4 oz)  DOWN from yesterday's weight of 86.3 kg    Intake/Output Summary (Last 24 hours) at 2020 0855  Last data filed at 2020 0600  Gross per 24 hour   Intake 370 ml   Output 850 ml   Net -480 ml     12-hour urine output 450 cc  Since admit net I/O is -6.9 liters    Respiratory:      O2 needs: none  recent CXR: no  Incentive spirometry compliance: yes  POD #3  2 view CXR needed? no    GI:  Bowel movement: yes  Last BM:     Labs:  Recent Labs     20  0345 20  0349 20  0236   WBC 12.4* 11.3* 11.1*   RBC 3.25* 3.32* 3.42*   HEMOGLOBIN 10.4* 10.4* 10.6*   HEMATOCRIT 30.6* 30.8* 32.0*   MCV 94.2 92.8 93.6   MCH 32.0 31.3 31.0   MCHC 34.0 33.8 33.1*   RDW 45.0 44.1 44.3   PLATELETCT 246 284 364   MPV 9.8 9.6 9.6     Recent Labs     20  0345 20  0349 20  0236   SODIUM 131* 133* 133*   POTASSIUM 5.1 4.8 5.0   CHLORIDE 98 99 100   CO2 22 21 21   GLUCOSE 112* 111* 104*   BUN 11 13 16   CREATININE 0.63 0.67 0.80   CALCIUM 9.7 9.5 9.6     INR:   Recent Labs      05/11/20  0400 05/12/20  0345 05/13/20  0349 05/14/20  0236   INR 2.74* 2.22* 2.03* 2.38*       Medications:  ASA:  Yes  Plavix: No; contraindicated because of Other isolated AVR  BB: Yes  Statin: yes  ACE/ARB: Yes  Lasix: yes    LDA's:  Central Line: yes  Murray: no  Chest tubes: no  Pacer Wires: no  Staples: no    Team's Goal for patient:    Discharge home today    Decide home pain management script    Place anti-coag referral    Coumadin not indicated-will dc    Discharge plan:    Home with wife Era

## 2020-05-14 NOTE — PROGRESS NOTES
Assumed care of patient, bedside report received from KEILA Wong. Updated on POC, call light within reach and fall precautions in place. Bed locked and in lowest position. Patient instructed to call for assistance before getting out of bed. All questions answered, no other needs at this time.

## 2020-05-14 NOTE — CARE PLAN
Problem: Communication  Goal: The ability to communicate needs accurately and effectively will improve  Outcome: PROGRESSING AS EXPECTED   Patient updated on plan to get OOB to chair for meals, ambulate, and wash up after breakfast.  Problem: Safety  Goal: Will remain free from injury  Outcome: PROGRESSING AS EXPECTED   Patient to call before getting OOB, or up from chair.   Problem: Discharge Barriers/Planning  Goal: Patient's continuum of care needs will be met  Outcome: PROGRESSING AS EXPECTED   Patient educated that he may go home today, family to pick him up at DC.   Problem: Respiratory:  Goal: Respiratory status will improve  Outcome: PROGRESSING AS EXPECTED   Patient on RA, able to demonstrate IS use correctly.   Problem: Post Op Day 4 CABG/Heart Valve Replacement  Goal: Optimal care of the Post Op CABG/Heart Valve replacement Post Op Day 4  Outcome: PROGRESSING AS EXPECTED   Patient doing well, meeting all goals.   Problem: Skin Integrity  Goal: Skin Integrity is maintained or improved  Outcome: PROGRESSING AS EXPECTED   Incision to chest well approximated, no signs of redness or swelling.

## 2020-05-14 NOTE — DISCHARGE PLANNING
Anticipated Discharge Disposition: home    Action: Discharge orders placed. RN ONELIA spoke with BS RNStacey, and no needs identified at this time.    Barriers to Discharge: none    Plan: Care coordination available for discharge needs

## 2020-05-14 NOTE — DISCHARGE INSTRUCTIONS
Discharge Instructions    Discharged to home by car with relative. Discharged via wheelchair, hospital escort: Yes.  Special equipment needed: Not Applicable    Be sure to schedule a follow-up appointment with your primary care doctor or any specialists as instructed.     Discharge Plan:        I understand that a diet low in cholesterol, fat, and sodium is recommended for good health. Unless I have been given specific instructions below for another diet, I accept this instruction as my diet prescription.   Other diet: cardiac    Special Instructions: DIVISION OF CARDIAC SURGERY DISCHARGE INSTRUCTIONS    Activity:    1. NO driving for 4 weeks after surgery. You may ride as a passenger.  2. NO lifting more than 10 pounds for 6 weeks.  3. For the next 6 weeks, keep your elbows close to your body and move within a pain-free motion when lifting, pushing or pulling.  Do not stretch both arms backwards at the same time.    4. Walk at least 4 times per day, there is no maximum.  5. Other physical activities (sex, housework, gardening, etc.) are okay after 4 weeks.  6. Continue using incentive spirometer for 2 weeks.  If you are going home on oxygen and you were not on oxygen prior to surgery, keep using until you are oxygen free.  7. Weigh yourself daily.  Call your Cardiologist for a weight gain of 2 or more pounds within 48 hours.  8. Take all of your medications as prescribed    Incision Care:    1. SHOWER EVERYDAY-no baths. Make sure to clean your incision(s) twice daily with plain, perfume and dye-free soap.  Then pat incision(s) dry with clean towel. No creams or lotions on your incision(s).    2. If you are discharging with a Prevena bandage on your chest, you or your home health nurse may remove the bandage 7 days after surgery, or sooner if the battery runs out or the device alarms. When the battery runs out, the bandage will lose suction and it will puff up.  To remove, slowly roll down the edges of the bandage.  Throw away the entire bandage and the battery pack.  Keep the incision open to air and clean twice daily with soap and water.  3. If there is any increased redness or swelling, separation of the incision line, or thick drainage from any of your incisions, call the Cardiac Surgeons ([phone]).    4. Continue to wear your JULIA Stockings for 4 weeks. You may take them off when you are in bed or when your legs are elevated.    General Instructions:    1. You have been referred to Cardiac Rehab.  You can start Cardiac Rehab 30 days after surgery.  If you do not have an appointment at the time of discharge call [phone] to schedule an appointment.  2. Your Primary Care Doctor typically handles home oxygen. Oxygen may be stopped when your oxygen level is consistently greater than 90.  Check with your Primary Care Doctor if you are unsure.  3. Take all of your medications (including pain medications) as prescribed.  Taking medications other than prescribed can result in serious injury.    For Patients Discharged with Narcotic Pain Medication:     1. If a refill is needed, understand that only 1 refill will be provided and you must come to the Cardiac Surgeons’ office for an appointment (72 hours’ notice is required to schedule and there are no weekend appointments).  2. If the pain medications you are discharged on are not working, you will need to bring your remaining prescription into the office in order to receive a new prescription.  3. If you were taking narcotics prior to your heart surgery, the Cardiac Surgeons will provide you with one prescription and additional medications will need to be provided by your pain management doctor.  4. Do not drink alcohol while taking narcotics.  5. Lost or stolen medications will not be refilled.  If medications are stolen, report to law enforcement.    Contact Cardiac Surgery at [phone] if you have any questions.    · Is patient discharged on Warfarin / Coumadin?   No    Depression / Suicide Risk    As you are discharged from this [facility] facility, it is important to learn how to keep safe from harming yourself.    Recognize the warning signs:  · Abrupt changes in personality, positive or negative- including increase in energy   · Giving away possessions  · Change in eating patterns- significant weight changes-  positive or negative  · Change in sleeping patterns- unable to sleep or sleeping all the time   · Unwillingness or inability to communicate  · Depression  · Unusual sadness, discouragement and loneliness  · Talk of wanting to die  · Neglect of personal appearance   · Rebelliousness- reckless behavior  · Withdrawal from people/activities they love  · Confusion- inability to concentrate     If you or a loved one observes any of these behaviors or has concerns about self-harm, here's what you can do:  · Talk about it- your feelings and reasons for harming yourself  · Remove any means that you might use to hurt yourself (examples: pills, rope, extension cords, firearm)  · Get professional help from the community (Mental Health, Substance Abuse, psychological counseling)  · Do not be alone:Call your Safe Contact- someone whom you trust who will be there for you.  · Call your local CRISIS HOTLINE 946-8000 or 671-355-9695  · Call your local Children's Mobile Crisis Response Team [place] (327) 453-9215 or www.[facility]  · Call the toll free National Suicide Prevention Hotlines   · National Suicide Prevention Lifeline 343-259-YURA (5322)  · National Hope Line Network 800-SUICIDE (290-5674)

## 2020-05-14 NOTE — PROGRESS NOTES
Patient discharged home. Patient AOX 4.  IV and tele box removed, discharge instructions provided to patient and reviewed with them. All questions answered, patient provided copy of discharge instructions and instructed on when to F/U with MD. Patient wheeled off unit. Patient discharged into the care of his wife Sima. Discharged instructions reviewed with his wife as well.

## 2020-05-14 NOTE — DISCHARGE SUMMARY
DISCHARGE SUMMARY    ADMISSION DATE: 5/7/2020    DISCHARGE DATE: 5/14/2020    CHIEF COMPLAINT ON ADMISSION: Shortness of breath.    ADMITTING DIAGNOSES:  Endocarditis (strep.  viridans), severe aortic stenosis, mild mitral regurgitation.    DISCHARGE DIAGNOSES:  Endocarditis (strep.  viridans), severe aortic stenosis, mild mitral regurgitation.    PROCEDURES PERFORMED: REPLACEMENT, AORTIC VALVE (25 mm Inspiris Mederos pericardial valve)  ECHOCARDIOGRAM, TRANSESOPHAGEAL    HISTORY OF PRESENT ILLNESS:    The patient is a 55-year-old male with a recent admission to the   hospital for treatment of suspected mitral valve endocarditis.  He had   bacteremia and grew Streptococcus viridans.  He received a full course of   intravenous antibiotics.  His last transesophageal echocardiogram did not show   any mitral valve vegetations.  There was severe aortic stenosis and   regurgitation.  Cardiac catheterization did not show any hemodynamically   significant coronary artery disease.    HOSPITAL COURSE:  POD 1 HTN- on cleviprex- starting home lisinopril, diurese, d/c mediastinal tubes, d/c dixon, pain - add toradol/oxycontin, transfer after off cleviprex  POD 2 HDS, SR, CR elevated/low UO- d/c lisinopril/toradol- replaced dixon 180 cc UO, BMP at 1200, amb, enc IS  POD 3 HDS BP improved with fluids, SR, D/c pacer wires, CR improved with fluids- good UO 1600 yesterday- nephrology was consulted- thanks for your input!, will d/c fluids today, ambulate  POD 4  HDS, SR/ST neuro intact, wounds CDI, abdomen S/NT, fluid balance negative, wt down. INR therapeutic.  Plan:  Low dose BB, ACE, Statin, IS/ambulate. CPM.  POD 5 HTN- add ace back now cr normal, Aflutter- - loaded with amio- start gtt/po amio, inc beta blocker, repeat UA, INR there  POD 6 HDS, SR- d/c amio- cont PO, UA negative, planning home in am if rhythm stable  POD 7: HDS.  NSR for 24 hrs.  Tolerating oral amiodarone.  Up ambulating hallways.  Pain controlled on current  narcotic regimen.  +BM.  Tolerating meals.  Anxious for discharge home.       RECENT LABS:     Lab Results   Component Value Date/Time    SODIUM 133 (L) 05/14/2020 02:36 AM    POTASSIUM 5.0 05/14/2020 02:36 AM    CHLORIDE 100 05/14/2020 02:36 AM    CO2 21 05/14/2020 02:36 AM    GLUCOSE 104 (H) 05/14/2020 02:36 AM    BUN 16 05/14/2020 02:36 AM    CREATININE 0.80 05/14/2020 02:36 AM      Lab Results   Component Value Date/Time    WBC 11.1 (H) 05/14/2020 02:36 AM    RBC 3.42 (L) 05/14/2020 02:36 AM    HEMOGLOBIN 10.6 (L) 05/14/2020 02:36 AM    HEMATOCRIT 32.0 (L) 05/14/2020 02:36 AM    MCV 93.6 05/14/2020 02:36 AM    MCH 31.0 05/14/2020 02:36 AM    MCHC 33.1 (L) 05/14/2020 02:36 AM    MPV 9.6 05/14/2020 02:36 AM    NEUTSPOLYS 56.00 05/06/2020 11:05 AM    LYMPHOCYTES 27.20 05/06/2020 11:05 AM    MONOCYTES 10.00 05/06/2020 11:05 AM    EOSINOPHILS 5.70 05/06/2020 11:05 AM    BASOPHILS 0.90 05/06/2020 11:05 AM      Lab Results   Component Value Date/Time    PROTHROMBTM 26.8 (H) 05/14/2020 02:36 AM    INR 2.38 (H) 05/14/2020 02:36 AM        ALLERGIES:     Morphine    DISCHARGE MEDICATIONS:      Medication List      START taking these medications      Instructions   amiodarone 400 MG tablet  Commonly known as:  PACERONE   Doctor's comments:  Amiodarone 400mg BID x 7 days, then Amiodarone 400mg PO daily x 7 days, then Amiodarone 200mg PO daily x 7 days thereafter.  Please dispense 200mg tablets  Take 1 Tab by mouth 2 Times a Day.  Dose:  400 mg     amLODIPine 10 MG Tabs  Start taking on:  May 15, 2020  Commonly known as:  NORVASC   Take 1 Tab by mouth every day.  Dose:  10 mg     HYDROcodone/acetaminophen  MG Tabs  Commonly known as:  NORCO   Take 1 Tab by mouth every 6 hours as needed for up to 14 days.  Dose:  1 Tab     metoprolol 25 MG Tabs  Commonly known as:  LOPRESSOR   Take 1 Tab by mouth 2 Times a Day.  Dose:  25 mg        CONTINUE taking these medications      Instructions   acetaminophen 325 MG Tabs  Commonly  known as:  TYLENOL   Take 2 Tabs by mouth every 6 hours as needed for Mild Pain or Fever (Mild Pain; (Pain scale 1-3); Temp greater than 100.5 F).  Dose:  650 mg     allopurinol 100 MG Tabs  Commonly known as:  ZYLOPRIM   Take 200 mg by mouth 2 Times a Day.  Dose:  200 mg     aspirin 81 MG EC tablet   Take 1 Tab by mouth every day.  Dose:  81 mg     atorvastatin 10 MG Tabs  Commonly known as:  LIPITOR   Take 0.5 Tabs by mouth every day.  Dose:  5 mg     famotidine 20 MG Tabs  Commonly known as:  PEPCID   Take 20 mg by mouth 1 time daily as needed.  Dose:  20 mg     lisinopril 10 MG Tabs  Commonly known as:  PRINIVIL   Take 10 mg by mouth every day.  Dose:  10 mg     loratadine 10 MG Tabs  Commonly known as:  CLARITIN   Take 10 mg by mouth 1 time daily as needed.  Dose:  10 mg     VITAMIN D3 PO   Take 1 Tab by mouth every day.  Dose:  1 Tab        STOP taking these medications    chlorhexidine 0.12 % Soln  Commonly known as:  PERIDEX     clindamycin 300 MG Caps  Commonly known as:  CLEOCIN     ibuprofen 200 MG Tabs  Commonly known as:  MOTRIN     MUCINEX DM PO     multivitamin Tabs            NARCOTIC PAIN MEDICATIONS:   In prescribing controlled substances to this patient, I certify that I have obtained and reviewed their medical history. I have also made a good fiona effort to obtain applicable records from other providers who have treated the patient .    I have conducted a physical exam and documented it. I have reviewed the patient's prescription history as maintained by the [place] Prescription Monitoring Program.     I have assessed the patient’s risk for abuse, dependency, and addiction using the validated Opioid Risk Tool.    Given the above, I believe the benefits of controlled substance therapy outweigh the risks. The reasons for prescribing controlled substances include non-narcotic, oral analgesic alternatives have been inadequate for pain control. Accordingly, I have discussed the risk and benefits,  treatment plan, and alternative therapies with the patient.     Pt understands this prescription is a controlled substance which is potentially habit-forming and its use is regulated by the [employer]. It must be submitted to the pharmacy within 5 days of the date written and can not be called in or faxed to the pharmacy. Refills are subject to terms of a medicine agreement. Any refill requires a new prescription that must be obtained from this office during regular office hours. We ask for 72 hours notice to get an appointment for a narcotic pain medication refill. This medicine can cause nausea, significant constipation, sedation, confusion.     DIET:   Cardiac diet    DISCHARGE INSTRUCTIONS DISCUSSED WITH THE PATIENT:      1. NO driving for 4 weeks after surgery. You may ride as a passenger.  2. NO lifting of any item over 10 lbs (e.g. gallon of milk) for 6 weeks after surgery.  3. DO walk as much as possible! Walk a minimum of once a day. Depending on your fatigue and comfort level, you may walk as much as you wish. There is no maximum.  4. Other physical activities (sex, housework, gardening, etc.) are OK after 4 weeks   5. Continue using incentive spirometer for 2 weeks, especially if going home on oxygen.    Incision Care:  1. SHOWER ONLY - no baths. Clean incision daily with plain Ivory ® soap or any other dye or perfume free soap. Then pat incision dry with clean towel. Avoid creams or lotions on the incision(s).  a. If there is any increase in redness or swelling, or separation of the incision line, or thick drainage* from any of the incisions, call right away  * Clear, thin drainage is not abnormal especially from the leg incision and/or chest tube sites.  2. Continue to wear your JULIA Stockings for 4 weeks. You may take off the stockings when in bed or when the legs are elevated.    Patient instructed to call [facility] at [phone]  if any increased shortness of breath, uncontrolled pain, weight gain  greater than 2 pounds in 1 day, SBP >140, HR <60 or redness swelling or drainage of incisions.      FOLLOW-UP:   Future Appointments   Date Time Provider Department Center   5/28/2020 10:40 AM CHERRY Washington [facility] None   6/15/2020  1:45 PM CHELO Sinha [facility] None    No follow-up provider specified.

## 2020-05-18 ENCOUNTER — TELEPHONE (OUTPATIENT)
Dept: CARDIOTHORACIC SURGERY | Facility: MEDICAL CENTER | Age: 55
End: 2020-05-18

## 2020-05-18 NOTE — TELEPHONE ENCOUNTER
"Received call back from patient.    He was checking he didn't need to get his blood drawn.  He was taken off coumadin prior to discharge and will not need INR draws which was conveyed to him.    He wanted to know IF he got congested could he restart his Mucinex from before, have coffee, and saline spray for his dry occasionally bleeding nose which I told him yes to all of the above.    They had questions on the amiodarone medication which was clarified regarding the tapering dosage for the next few weeks.    He states he is weighing himself everyday, he is 181.4 lbs, walking multiple times a day, is utilizing the IS getting around 2000 ml, and he states his incision looks good, he reports his skin is a little \"tight\".    He was told he could gently massage his skin and apply lotion not directly on the incision but around the sides.    Call time 17 minutes       "

## 2020-05-18 NOTE — TELEPHONE ENCOUNTER
Received voicemail from patient and called him back.  I left a voicemail for him with my call back number.

## 2020-05-26 NOTE — PROGRESS NOTES
Chief Complaint   Patient presents with   • Aortic Stenosis      Nonrheumatic aortic valve stenosis    • HTN (Controlled)       Subjective:   Sanjeev Malagon is a 55 y.o. male who presents today for hospital follow up s/p SAVR.    He is patient of Dr. Chaudhary in our clinic, present to ER on 5/7/2020 with sob. He was noted to have endocarditis with strep viridans and severe aortic stenosis. Received the full course of IV antibiotics. His last ECHO showed no vegetation noted on mitral valve, but severe aortic stenosis with regurgitation. Cath showed no significant coronary artery disease. Underwent AVR ( 25 mm Inspiris Mederos pericardial valve)) with Dr. Markham 5/7/2020. Postoperatively, stay was prolonged due to sultana and atrial flutter. He was started on amio loading and reverted back to NSR. SULTANA resolved with fluid.     Overall, patient is doing well. Denies any chest pain, SOB, palpitations or dizziness.     Tolerating medication well. Sternal incision healing well. He is walking at least 2k steps per day.     Atrial flutter; denies any chest pain or palpitations. Pulse at home has been ranging 80-90bpm.     Past Medical History:   Diagnosis Date   • Hyperlipidemia    • Hypertension      Past Surgical History:   Procedure Laterality Date   • AORTIC VALVE REPLACEMENT N/A 5/7/2020    Procedure: REPLACEMENT, AORTIC VALVE;  Surgeon: Katarina Markham M.D.;  Location: SURGERY [facility];  Service: Cardiothoracic   • BEATRIZ N/A 5/7/2020    Procedure: ECHOCARDIOGRAM, TRANSESOPHAGEAL;  Surgeon: Katarina Markham M.D.;  Location: SURGERY [facility];  Service: Cardiothoracic     Family History   Problem Relation Age of Onset   • Heart Disease Mother    • Stroke Mother    • Diabetes Mother    • No Known Problems Father    • Cancer Sister    • Diabetes Maternal Grandmother      Social History     Socioeconomic History   • Marital status:      Spouse name: Not on file   • Number of children: Not on file    • Years of education: Not on file   • Highest education level: Not on file   Occupational History   • Not on file   Social Needs   • Financial resource strain: Not on file   • Food insecurity     Worry: Not on file     Inability: Not on file   • Transportation needs     Medical: Not on file     Non-medical: Not on file   Tobacco Use   • Smoking status: Never Smoker   • Smokeless tobacco: Never Used   Substance and Sexual Activity   • Alcohol use: Not Currently     Alcohol/week: 1.2 oz     Types: 2 Cans of beer per week     Frequency: Monthly or less     Drinks per session: 1 or 2     Comment: social    • Drug use: Yes     Types: Cocaine     Comment: when young   • Sexual activity: Not on file   Lifestyle   • Physical activity     Days per week: Not on file     Minutes per session: Not on file   • Stress: Not on file   Relationships   • Social connections     Talks on phone: Not on file     Gets together: Not on file     Attends Taoism service: Not on file     Active member of club or organization: Not on file     Attends meetings of clubs or organizations: Not on file     Relationship status: Not on file   • Intimate partner violence     Fear of current or ex partner: Not on file     Emotionally abused: Not on file     Physically abused: Not on file     Forced sexual activity: Not on file   Other Topics Concern   • Not on file   Social History Narrative   • Not on file     Allergies   Allergen Reactions   • Morphine Hives     Outpatient Encounter Medications as of 5/28/2020   Medication Sig Dispense Refill   • amiodarone (CORDARONE) 200 MG Tab PLEASE SEE ATTACHED FOR DETAILED DIRECTIONS     • metoprolol (LOPRESSOR) 50 MG Tab Take 1 Tab by mouth 2 Times a Day. 60 Tab 5   • amLODIPine (NORVASC) 10 MG Tab Take 1 Tab by mouth every day. 30 Tab 1   • HYDROcodone/acetaminophen (NORCO)  MG Tab Take 1 Tab by mouth every 6 hours as needed for up to 14 days. 56 Tab 0   • lisinopril (PRINIVIL) 10 MG Tab Take 10 mg  "by mouth every day.     • loratadine (CLARITIN) 10 MG Tab Take 10 mg by mouth 1 time daily as needed.     • famotidine (PEPCID) 20 MG Tab Take 20 mg by mouth 1 time daily as needed.     • atorvastatin (LIPITOR) 10 MG Tab Take 0.5 Tabs by mouth every day. 45 Tab 1   • aspirin EC 81 MG EC tablet Take 1 Tab by mouth every day. 30 Tab 0   • acetaminophen (TYLENOL) 325 MG Tab Take 2 Tabs by mouth every 6 hours as needed for Mild Pain or Fever (Mild Pain; (Pain scale 1-3); Temp greater than 100.5 F). 90 Tab 0   • allopurinol (ZYLOPRIM) 100 MG Tab Take 200 mg by mouth 2 Times a Day.     • Cholecalciferol (VITAMIN D3 PO) Take 1 Tab by mouth every day.     • [DISCONTINUED] amiodarone (PACERONE) 400 MG tablet Take 1 Tab by mouth 2 Times a Day. 56 Tab 1   • [DISCONTINUED] metoprolol (LOPRESSOR) 25 MG Tab Take 1 Tab by mouth 2 Times a Day. 60 Tab 1     No facility-administered encounter medications on file as of 5/28/2020.      Review of Systems   Constitutional: Negative for malaise/fatigue.   Eyes: Negative for blurred vision and double vision.   Respiratory: Negative for cough, shortness of breath and wheezing.    Cardiovascular: Negative for chest pain, palpitations, orthopnea and leg swelling.   Musculoskeletal: Negative for falls.   Neurological: Negative for dizziness, focal weakness, loss of consciousness, weakness and headaches.   All other systems reviewed and are negative.       Objective:   /70 (BP Location: Left arm, Patient Position: Sitting, BP Cuff Size: Adult)   Pulse 94   Resp 19   Ht 1.753 m (5' 9\")   Wt 83.9 kg (185 lb)   SpO2 96%   BMI 27.32 kg/m²     Physical Exam   Constitutional: He is oriented to person, place, and time. He appears well-developed and well-nourished. No distress.   HENT:   Head: Normocephalic and atraumatic.   Eyes: Pupils are equal, round, and reactive to light.   Neck: No JVD present.   Cardiovascular: Normal rate, regular rhythm and normal heart sounds.   No murmur " heard.  Sternal incision healing well. No sign of disassociation noted.    Pulmonary/Chest: Effort normal and breath sounds normal.   Abdominal: Soft. Bowel sounds are normal. He exhibits no distension.   Musculoskeletal:         General: No edema.   Neurological: He is alert and oriented to person, place, and time.   Skin: Skin is warm and dry. He is not diaphoretic.   Psychiatric: He has a normal mood and affect. His behavior is normal. Judgment and thought content normal.       [facility] 3/17/20  FINDINGS:  I. HEMODYNAMICS:               Ao: 113/79 mmHg                II. CORONARY ANGIOGRAPHY:  Left Main: Moderate caliber short bifurcating no CAD.  Left Anterior Descending: Moderate caliber.  Moderate caliber first diagonal.  There is mild to moderate diffuse proximal to mid LAD CAD but no obstructive disease.  Left Circumflex: Large caliber dominant mild nonobstructive CAD.  Right Coronary: Moderate caliber nondominant mild nonobstructive CAD.     POSTOPERATIVE DIAGNOSIS:  1.  Nonobstructive CAD.  2.  Normal caliber ascending arch and descending thoracic aorta and normal caliber abdominal aortoiliac system.  3.  Mild to moderate aortic insufficiency.     Echocardiogram: 3/10/2020, LVEF 70%, mobile vegetation seen in the anterior mitral valve leaflet, severe aortic stenosis, peak 104 mmHg, mean 59 mmHg     Normal left ventricular chamber size. Moderate concentric left   ventricular hypertrophy. Normal left ventricular systolic function.   Left ventricular ejection fraction is visually estimated to be 70%.   Normal diastolic function.     Right Ventricle  The right ventricle was normal in size and function.     Right Atrium  The right atrium is normal in size.  Normal inferior vena cava size and   inspiratory collapse.     Left Atrium  The left atrium is normal in size.  Left atrial volume index is 32   mL/sq m.     Mitral Valve  Mobile vegetation seen on the anterior mitral valve leaflet. Mild   mitral regurgitation.  No mitral stenosis.     Aortic Valve  Severe aortic stenosis. Vmax is 5.0 m/s. Transvalvular gradients are -   Peak: 104 mmHg, Mean: 59 mmHg.     Tricuspid Valve  Structurally normal tricuspid valve without significant stenosis. Mild   tricuspid regurgitation. Estimated right ventricular systolic pressure    is 38 mmHg. Right atrial pressure is estimated to be 3 mmHg.     Pulmonic Valve  Structurally normal pulmonic valve without significant stenosis or   regurgitation.     Pericardium  Normal pericardium without effusion.     Aorta  The aortic root is normal.    Assessment:     1. S/P AVR (aortic valve replacement)  Basic Metabolic Panel    Prothrombin Time   2. Atypical atrial flutter (HCC)  EKG - Clinic Performed   3. Essential hypertension         Medical Decision Making:  Today's Assessment / Status / Plan:     1. S/p AVR:  - sternal incision healing well, sternal precaution discussed   - SBE prophylactic discussed.   - follow up with CTS    2. Postop Atrial flutter, no noted recurrence:  - EKG today showed NSR 90s  - increased metoprolol 50mg BID   - continue amiodarone 200mg qd   - [id number] (CHF/CM, HTN, Age, DM, CVA, Vascular disease, Gender) = 1. Continue asa therapy     3. Hypertension:  - stable   - continue lisinopril 10mg qd   - continue amlodipine 10mg qd and bb as noted above  - monitor blood pressure and pulse at home     Follow up in 1 month, sooner as needed.     Collaborating Provider: Dr. Chaudhary

## 2020-05-26 NOTE — DOCUMENTATION QUERY
[facility]                                                                       Query Response Note      PATIENT:               DEBBIE NIEVES  ACCT #:                  [id number]  MRN:                     [id number]  :                      1965  ADMIT DATE:       2020 5:27 AM  DISCH DATE:        2020 11:06 AM  RESPONDING  PROVIDER #:        [id number]           QUERY TEXT:    It is documented in the chart that the patient had a recent admission  with a diagnosis of bacterial endocarditis with strep viridans that was treated with antibiotics. However it is documented as a current diagnosis in the op report and the discharge summary. Can this diagnosis be clarified?    The patient's Clinical Indicators include:  Consult note : past medical history significant for severe aortic stenosis, hypertension, hyperlipidemia,  strep viridans bacteremia with associated mitral valve endocarditis from dental source who presents for an elective aortic valve replacement today by Dr. Markham.   -History of SBE (subacute bacterial endocarditis)- (present on admission)  Assessment & Plan  Did not require surgical intervention status post appropriate IV antibiotic course  Monitor  OP Report -PreOp Diagnosis: Endocarditis (strep.  viridans), severe aortic stenosis, mild mitral regurgitation.   PostOp Diagnosis: Same    Discharge Summary: ADMITTING DIAGNOSES:  Endocarditis (strep.  viridans), severe aortic stenosis, mild mitral regurgitation.   DISCHARGE DIAGNOSES:  Endocarditis (strep.  viridans), severe aortic stenosis, mild mitral regurgitation.     Also in Discharge Summary:The patient is a 55-year-old male with a recent admission to the   hospital for treatment of suspected mitral valve endocarditis.  He had   bacteremia and grew Streptococcus viridans.  He received a full course of   intravenous antibiotics.  Options provided:   --  Bacterial endocarditis active diagnosis on this admission   -- Bacterial endocarditis was treated and resolved before this admission   -- Unable to determine      Query created by: Katie Zimmerman on 5/21/2020 7:33 AM    RESPONSE TEXT:    Bacterial endocarditis was treated and resolved before this admission          Electronically signed by:  TARIQ GAYTAN MD 5/26/2020 7:17 AM

## 2020-05-28 ENCOUNTER — OFFICE VISIT (OUTPATIENT)
Dept: CARDIOLOGY | Facility: MEDICAL CENTER | Age: 55
End: 2020-05-28
Payer: COMMERCIAL

## 2020-05-28 ENCOUNTER — TELEPHONE (OUTPATIENT)
Dept: CARDIOLOGY | Facility: MEDICAL CENTER | Age: 55
End: 2020-05-28

## 2020-05-28 VITALS
HEART RATE: 94 BPM | OXYGEN SATURATION: 96 % | RESPIRATION RATE: 19 BRPM | SYSTOLIC BLOOD PRESSURE: 110 MMHG | DIASTOLIC BLOOD PRESSURE: 70 MMHG | BODY MASS INDEX: 27.4 KG/M2 | HEIGHT: 69 IN | WEIGHT: 185 LBS

## 2020-05-28 DIAGNOSIS — I48.4 ATYPICAL ATRIAL FLUTTER (HCC): ICD-10-CM

## 2020-05-28 DIAGNOSIS — Z95.2 S/P AVR (AORTIC VALVE REPLACEMENT): ICD-10-CM

## 2020-05-28 DIAGNOSIS — I10 ESSENTIAL HYPERTENSION: ICD-10-CM

## 2020-05-28 PROBLEM — I48.0 PAROXYSMAL ATRIAL FIBRILLATION (HCC): Status: RESOLVED | Noted: 2020-05-28 | Resolved: 2020-05-28

## 2020-05-28 PROBLEM — I48.0 PAROXYSMAL ATRIAL FIBRILLATION (HCC): Status: ACTIVE | Noted: 2020-05-28

## 2020-05-28 PROCEDURE — 99214 OFFICE O/P EST MOD 30 MIN: CPT | Performed by: NURSE PRACTITIONER

## 2020-05-28 PROCEDURE — 93000 ELECTROCARDIOGRAM COMPLETE: CPT | Performed by: INTERNAL MEDICINE

## 2020-05-28 RX ORDER — METOPROLOL TARTRATE 50 MG/1
50 TABLET, FILM COATED ORAL 2 TIMES DAILY
Qty: 60 TAB | Refills: 5 | Status: SHIPPED | OUTPATIENT
Start: 2020-05-28 | End: 2020-10-05

## 2020-05-28 RX ORDER — AMIODARONE HYDROCHLORIDE 200 MG/1
200 TABLET ORAL DAILY
COMMUNITY
Start: 2020-05-14 | End: 2020-06-05 | Stop reason: SDUPTHER

## 2020-05-28 ASSESSMENT — ENCOUNTER SYMPTOMS
FOCAL WEAKNESS: 0
LOSS OF CONSCIOUSNESS: 0
SHORTNESS OF BREATH: 0
COUGH: 0
DOUBLE VISION: 0
FALLS: 0
ORTHOPNEA: 0
HEADACHES: 0
WEAKNESS: 0
BLURRED VISION: 0
WHEEZING: 0
PALPITATIONS: 0
DIZZINESS: 0

## 2020-05-28 ASSESSMENT — FIBROSIS 4 INDEX: FIB4 SCORE: 0.7

## 2020-05-28 NOTE — PATIENT INSTRUCTIONS
- increase metoprolol 50mg Twice a day   - continue amiodarone 200mg once a day   - check your blood pressure and pulse, record it down   Blood pressure close to 110/70... and pulse 60-80

## 2020-05-28 NOTE — TELEPHONE ENCOUNTER
EVITA Washington provided patient with a release letter to go back to work on light duty with no lifting greater than 20 pounds.     Faxed to patients HR department at his job per his request [phone], confirmation fax sent to scan.     Patient was given a copy of letter along with the office contact info. Patient knows to call with any questions or concerns.

## 2020-05-28 NOTE — TELEPHONE ENCOUNTER
Pt is seen in the office by APRN.  Per APRN recommendations, pt may return to work on 06/08/2020 on light duty, no lifting greater than 20 pounds.      Letter drafted with APRN recommendations and relayed letter to APRN for signature.  Signature obtained.  Letter copied, original given to APRN to give to pt and copy given to MA with task to fax to pt requested number.

## 2020-05-28 NOTE — TELEPHONE ENCOUNTER
Spoke to patient regarding his labs ordered during today's visit with RT, Patient requested a paper copy of his lab orders to be sent to his house.     Sent via mail, address was confirmed.

## 2020-05-29 LAB — EKG IMPRESSION: NORMAL

## 2020-06-05 ENCOUNTER — TELEPHONE (OUTPATIENT)
Dept: CARDIOLOGY | Facility: MEDICAL CENTER | Age: 55
End: 2020-06-05

## 2020-06-05 RX ORDER — AMIODARONE HYDROCHLORIDE 200 MG/1
200 TABLET ORAL DAILY
Qty: 90 TAB | Refills: 3 | Status: SHIPPED | OUTPATIENT
Start: 2020-06-05 | End: 2022-01-13 | Stop reason: SDUPTHER

## 2020-06-05 NOTE — TELEPHONE ENCOUNTER
RT pt requesting a refill for amiodarone    Pharmacy: [facility]/pharmacy #[id number] - [place], [place] - [address]

## 2020-06-05 NOTE — TELEPHONE ENCOUNTER
[facility]20 [facility]. Called pt. To advise.

## 2020-06-11 ENCOUNTER — TELEPHONE (OUTPATIENT)
Dept: CARDIOLOGY | Facility: MEDICAL CENTER | Age: 55
End: 2020-06-11

## 2020-06-11 NOTE — TELEPHONE ENCOUNTER
Chart prep    Spoke to patient regarding labs for prothrombin time and BMP. Patient stated he will complete them at a [facility] this weekend. Confirmed appt time and date 6/25 at 2pm with RT.

## 2020-06-15 ENCOUNTER — APPOINTMENT (OUTPATIENT)
Dept: CARDIOTHORACIC SURGERY | Facility: MEDICAL CENTER | Age: 55
End: 2020-06-15
Payer: COMMERCIAL

## 2020-06-20 LAB
BUN SERPL-MCNC: 18 MG/DL (ref 6–24)
BUN/CREAT SERPL: 23 (ref 9–20)
CALCIUM SERPL-MCNC: 9.9 MG/DL (ref 8.7–10.2)
CHLORIDE SERPL-SCNC: 104 MMOL/L (ref 96–106)
CO2 SERPL-SCNC: 20 MMOL/L (ref 20–29)
CREAT SERPL-MCNC: 0.79 MG/DL (ref 0.76–1.27)
GLUCOSE SERPL-MCNC: 100 MG/DL (ref 65–99)
INR PPP: 1.1 (ref 0.8–1.2)
POTASSIUM SERPL-SCNC: 5.3 MMOL/L (ref 3.5–5.2)
PROTHROMBIN TIME: 11.7 SEC (ref 9.1–12)
SODIUM SERPL-SCNC: 139 MMOL/L (ref 134–144)

## 2020-06-22 ENCOUNTER — OFFICE VISIT (OUTPATIENT)
Dept: CARDIOTHORACIC SURGERY | Facility: MEDICAL CENTER | Age: 55
End: 2020-06-22
Payer: COMMERCIAL

## 2020-06-22 VITALS
SYSTOLIC BLOOD PRESSURE: 132 MMHG | DIASTOLIC BLOOD PRESSURE: 70 MMHG | BODY MASS INDEX: 29.33 KG/M2 | TEMPERATURE: 98 F | HEIGHT: 69 IN | HEART RATE: 76 BPM | WEIGHT: 198 LBS | OXYGEN SATURATION: 97 %

## 2020-06-22 DIAGNOSIS — Z09 POSTOP CHECK: ICD-10-CM

## 2020-06-22 PROCEDURE — 99024 POSTOP FOLLOW-UP VISIT: CPT | Performed by: NURSE PRACTITIONER

## 2020-06-22 ASSESSMENT — FIBROSIS 4 INDEX: FIB4 SCORE: 0.7

## 2020-06-22 NOTE — PROGRESS NOTES
"CHIEF COMPLAINT: Post-op visit     PROCEDURE: 5/7/2020: Aortic valve replacement (25 mm Inspiris Mederos pericardial valve) and intraoperative transesophageal echocardiography.    HPI: Doing really well. Already back to work. He wants to start cardiac rehab.      PHYSICAL EXAM:  /70 (BP Location: Right arm, Patient Position: Sitting, BP Cuff Size: Adult)   Pulse 76   Temp 36.7 °C (98 °F) (Temporal)   Ht 1.753 m (5' 9\")   Wt 89.8 kg (198 lb)   SpO2 97%   BMI 29.24 kg/m²     Physical Exam   Constitutional: He is oriented to person, place, and time and well-developed, well-nourished, and in no distress.   HENT:   Head: Normocephalic.   Cardiovascular: Normal rate and regular rhythm.   Pulmonary/Chest: Effort normal and breath sounds normal. No respiratory distress.   Abdominal: Soft.   Musculoskeletal:         General: No edema.   Neurological: He is alert and oriented to person, place, and time.   Skin: Skin is warm and dry.   Sternum Stable  Sternal Incision- C/D/I  Sebaceous cyst above sternal incsion   Psychiatric: Mood, memory, affect and judgment normal.        PLAN:   Continue baby aspirin indefinitely.    Ok to start cardiac rehab when open     We reviewed post operative sternal precautions, weight limits and driving precautions moving forward.  We reviewed SBE prophylaxis.      Overall, we are very pleased with the patient’s progress and we have instructed the patient to follow-up with us in the future should they have any concerns related to their surgery. Otherwise, we will see the patient on a PRN basis. The patient will continue to follow-up with their Cardiologist and PCP.  The patient has been informed that any further prescription refills should be done through their primary care physician and/or cardiologist.  They acknowledged understanding.  Thank you for allowing us to participate in the care of this very pleasant patient and please let us know if there is any way we may be of further " assistance.

## 2020-06-25 ENCOUNTER — TELEMEDICINE (OUTPATIENT)
Dept: CARDIOLOGY | Facility: MEDICAL CENTER | Age: 55
End: 2020-06-25
Payer: COMMERCIAL

## 2020-06-25 VITALS — BODY MASS INDEX: 27.37 KG/M2 | WEIGHT: 191.2 LBS | HEART RATE: 78 BPM | HEIGHT: 70 IN

## 2020-06-25 DIAGNOSIS — I10 ESSENTIAL HYPERTENSION: ICD-10-CM

## 2020-06-25 DIAGNOSIS — Z95.2 S/P AVR (AORTIC VALVE REPLACEMENT): ICD-10-CM

## 2020-06-25 DIAGNOSIS — I48.4 ATYPICAL ATRIAL FLUTTER (HCC): ICD-10-CM

## 2020-06-25 PROCEDURE — 99214 OFFICE O/P EST MOD 30 MIN: CPT | Mod: 95,CR | Performed by: NURSE PRACTITIONER

## 2020-06-25 ASSESSMENT — FIBROSIS 4 INDEX: FIB4 SCORE: 0.7

## 2020-06-25 NOTE — LETTER
[facility]   [address], [address]  [place] [address]  Phone: [phone]  Fax: [phone]              Sanjeev Malagon  1965    Encounter Date: 6/25/2020    CHERRY [place]          PROGRESS NOTE:  No notes on file      Lala Arndt D.O.  [address]   [facility] [address]  [facility] [address]  VIA Facsimile: [phone]

## 2020-06-25 NOTE — PROGRESS NOTES
Telemedicine Visit: Established Patient     This encounter was conducted via Zoom .   Verbal consent was obtained. Patient's identity was verified.    Subjective:   CC: follow up appointment   Sanjeev Malagon is a 55 y.o. male presenting for evaluation and management of: s/p AVR.    History of s/p AVR 5/7/2020 with inspiris edward pericardial valve and postoperative atrial flutter.     Interim events:  Patient is doing well. He has been doing well. Denies any chest pain, sob, dizziness or palpitations.     ROS   Denies any recent fevers or chills. No nausea or vomiting. No chest pains or shortness of breath.     Allergies   Allergen Reactions   • Morphine Hives       Current medicines (including changes today)  Current Outpatient Medications   Medication Sig Dispense Refill   • amiodarone (CORDARONE) 200 MG Tab Take 1 Tab by mouth every day. 90 Tab 3   • metoprolol (LOPRESSOR) 50 MG Tab Take 1 Tab by mouth 2 Times a Day. 60 Tab 5   • amLODIPine (NORVASC) 10 MG Tab Take 1 Tab by mouth every day. 30 Tab 1   • lisinopril (PRINIVIL) 10 MG Tab Take 10 mg by mouth every day.     • loratadine (CLARITIN) 10 MG Tab Take 10 mg by mouth 1 time daily as needed.     • famotidine (PEPCID) 20 MG Tab Take 20 mg by mouth 1 time daily as needed.     • atorvastatin (LIPITOR) 10 MG Tab Take 0.5 Tabs by mouth every day. 45 Tab 1   • aspirin EC 81 MG EC tablet Take 1 Tab by mouth every day. 30 Tab 0   • acetaminophen (TYLENOL) 325 MG Tab Take 2 Tabs by mouth every 6 hours as needed for Mild Pain or Fever (Mild Pain; (Pain scale 1-3); Temp greater than 100.5 F). 90 Tab 0   • allopurinol (ZYLOPRIM) 100 MG Tab Take 200 mg by mouth 2 Times a Day.     • Cholecalciferol (VITAMIN D3 PO) Take 1 Tab by mouth every day.       No current facility-administered medications for this visit.        Patient Active Problem List    Diagnosis Date Noted   • History of SBE (subacute bacterial endocarditis) 03/31/2020     Priority: Medium   •  "Hypertension 03/10/2020     Priority: Medium   • S/P AVR (aortic valve replacement) 05/28/2020   • Back pain 03/12/2020   • Acute bacterial endocarditis 03/10/2020   • Gout 03/10/2020   • Dental caries 03/10/2020       Family History   Problem Relation Age of Onset   • Heart Disease Mother    • Stroke Mother    • Diabetes Mother    • No Known Problems Father    • Cancer Sister    • Diabetes Maternal Grandmother        He  has a past medical history of Hyperlipidemia and Hypertension.  He  has a past surgical history that includes aortic valve replacement (N/A, 5/7/2020) and flaco (N/A, 5/7/2020).       Objective:   BP (!) 0/0 (BP Location: Left arm, Patient Position: Sitting, BP Cuff Size: Adult)   Pulse 78   Ht 1.778 m (5' 10\")   Wt 86.7 kg (191 lb 3.2 oz)   BMI 27.43 kg/m²     Physical Exam:  Constitutional: Alert, no distress, well-groomed.  Skin: No rashes in visible areas.  Eye: Round. Conjunctiva clear, lids normal. No icterus.   ENMT: Lips pink without lesions, good dentition, moist mucous membranes.  CV: Pulse as reported by patient  Respiratory: Unlabored respiratory effort, no cough or audible wheeze  Psych: Alert and oriented x3, normal affect and mood.       Assessment and Plan:   The following treatment plan was discussed:     1. S/P AVR (aortic valve replacement)  - Basic Metabolic Panel; Future  - ZIO PATCH MONITOR; Future    2. Atypical atrial flutter (HCC)  - ZIO PATCH MONITOR; Future    3. Essential hypertension    1. S/p AVR:  - sternal incision healing well, sternal precaution discussed   - SBE prophylactic discussed.   - follow up with CTS     2. Postop Atrial flutter, no noted recurrence:  - continue metoprolol 50mg BID   - stop amiodarone 200mg qd, order 2 week monitor    - [id number] (CHF/CM, HTN, Age, DM, CVA, Vascular disease, Gender) = 1. Continue asa therapy      3. Hypertension:  - stable   - continue lisinopril 10mg qd   - continue amlodipine 10mg qd and bb as noted above  - monitor " blood pressure and pulse at home     4. Hyperkalemia:  - stop OTC multivitamin and repeat BMP    Follow up in 6 months pending testing result, sooner as needed.    Collaborating Provider: Dr. Nash       Follow-up: Return in about 6 months (around 12/25/2020).

## 2020-06-29 ENCOUNTER — TELEPHONE (OUTPATIENT)
Dept: CARDIOLOGY | Facility: MEDICAL CENTER | Age: 55
End: 2020-06-29

## 2020-07-07 NOTE — TELEPHONE ENCOUNTER
RT    Patient wife, Era, called to see if the Pt should still take amLODIPine (NORVASC) 10 MG Tab and if so, please make a new prescription and call Era back to let them know whether they should continue to take or not. They also have questions on fish oil and vitamins. They can be reached at [phone].    Thank you,  Teresa FLEMING

## 2020-07-07 NOTE — TELEPHONE ENCOUNTER
Called Era to advise that pt. Should continue Amlodipine 10mg QD. Refill sent to pharmacy.  Scheduled pt. For Ziopatch and 6 mo. FV with Rs.  To Dr. Chaudhary: pt. Used to take Omega 3 fish oil caps 100mg QD. Should he restart this?

## 2020-07-08 NOTE — TELEPHONE ENCOUNTER
Patient calls and says that his wife made the appointment for the Zio; however, he says that this was never discussed with him and he knows nothing about it.     Per Randall's Assessment and Plan from 6/25:  Postop Atrial flutter, no noted recurrence:  - continue metoprolol 50mg BID   - stop amiodarone 200mg qd, order 2 week monitor    - [id number] (CHF/CM, HTN, Age, DM, CVA, Vascular disease, Gender) = 1. Continue asa therapy

## 2020-07-09 NOTE — TELEPHONE ENCOUNTER
I explained the purpose for the Zio and he is willing.  He will be here on the 23rd to have it applied and will call in the meantime if he gets any pounding in his chest that may signify he is back in flutter.

## 2020-07-11 DIAGNOSIS — I10 ESSENTIAL HYPERTENSION: Primary | ICD-10-CM

## 2020-07-14 RX ORDER — AMLODIPINE BESYLATE 10 MG/1
TABLET ORAL
Qty: 90 TAB | Refills: 3 | Status: SHIPPED | OUTPATIENT
Start: 2020-07-14 | End: 2022-01-13 | Stop reason: SDUPTHER

## 2020-07-17 ENCOUNTER — OFFICE VISIT (OUTPATIENT)
Dept: URGENT CARE | Facility: CLINIC | Age: 55
End: 2020-07-17
Payer: COMMERCIAL

## 2020-07-17 ENCOUNTER — HOSPITAL ENCOUNTER (OUTPATIENT)
Facility: MEDICAL CENTER | Age: 55
End: 2020-07-17
Attending: PHYSICIAN ASSISTANT
Payer: COMMERCIAL

## 2020-07-17 VITALS
RESPIRATION RATE: 12 BRPM | HEART RATE: 63 BPM | WEIGHT: 199 LBS | SYSTOLIC BLOOD PRESSURE: 112 MMHG | BODY MASS INDEX: 29.47 KG/M2 | DIASTOLIC BLOOD PRESSURE: 80 MMHG | TEMPERATURE: 97.5 F | OXYGEN SATURATION: 97 % | HEIGHT: 69 IN

## 2020-07-17 DIAGNOSIS — Z20.822 CLOSE EXPOSURE TO COVID-19 VIRUS: ICD-10-CM

## 2020-07-17 DIAGNOSIS — R05.9 COUGH: ICD-10-CM

## 2020-07-17 LAB — COVID ORDER STATUS COVID19: NORMAL

## 2020-07-17 PROCEDURE — U0003 INFECTIOUS AGENT DETECTION BY NUCLEIC ACID (DNA OR RNA); SEVERE ACUTE RESPIRATORY SYNDROME CORONAVIRUS 2 (SARS-COV-2) (CORONAVIRUS DISEASE [COVID-19]), AMPLIFIED PROBE TECHNIQUE, MAKING USE OF HIGH THROUGHPUT TECHNOLOGIES AS DESCRIBED BY CMS-2020-01-R: HCPCS

## 2020-07-17 PROCEDURE — 99214 OFFICE O/P EST MOD 30 MIN: CPT | Mod: CS | Performed by: PHYSICIAN ASSISTANT

## 2020-07-17 ASSESSMENT — ENCOUNTER SYMPTOMS
SPUTUM PRODUCTION: 0
DIZZINESS: 0
NAUSEA: 0
CHILLS: 0
PALPITATIONS: 0
COUGH: 1
MYALGIAS: 1
DIARRHEA: 1
ABDOMINAL PAIN: 0
EYE PAIN: 0
SORE THROAT: 1
VOMITING: 0
HEADACHES: 0
CONSTIPATION: 0
SHORTNESS OF BREATH: 0
FEVER: 1

## 2020-07-17 ASSESSMENT — FIBROSIS 4 INDEX: FIB4 SCORE: 0.7

## 2020-07-17 NOTE — PATIENT INSTRUCTIONS
Viral syndrome and Novel Coronavirus (COVID-19)       You have a viral syndrome which may include symptoms like muscle aches, fevers, chills, runny nose, cough, sneezing, sore throat, vomiting or diarrhea.  One of the potential viruses you may have is SARSCoV-2, the virus that causes COVID-19, also known as the novel coronavirus.  You may be just as likely to have a different viral infection such as the common cold or flu.  Most patients with COVID -19 have mild symptoms and recover on their own. Resting, staying hydrated, and sleeping are typically helpful.  As of today's visit, you are well enough to go home and treat your symptoms with oral fluids, medicines for fevers, cough, pain, etc.        COVID 19 testing is not performed on most people with mild symptoms who are being discharged from the Emergency Department or Clinic.      If COVID 19 testing was performed, the results will not be available for up to 4 days.  Please DO NOT CONTACT THE EMERGENCY DEPARTMENT OR CLINIC FOR RESULTS OF THIS TEST.       You will be contacted by a member of the [facility] team with your results and for further discussion.       Please follow the precautions below:      • Stay home except to get medical care.   • As advised by the Centers for Disease Control and Prevention (CDC), we recommend you stay in your home and minimize contact with others to avoid spreading this infection.   • The elderly or anyone with significant medical issues may have more severe symptoms from this infection. We recommend separation, also known as self-isolation, for at least 7 days after your first day of symptoms and several more after that if you are still sick. The most important action is wait for at least  a week and several more days after you feel well before returning to you regular activities, work or school. If you become sicker, like difficulty breathing, chest pain, you are unable to eat or drink enough, or have severe vomiting,  "diarrhea or weakness, you may need to return to the Emergency Department or contact your clinic provider for re-evaluation.    • You should restrict activities outside your home, except for getting medical care. Do not go to work, school, or public areas. Avoid using public transportation, ride-sharing, or taxis.   • Separate yourself from other people and animals in your home.   • As much as possible, you should stay in a specific room and away from other people in your home. Also, you should use a separate bathroom, if available.   • Avoid sharing personal household items. You should not share dishes, drinking glasses, cups, eating utensils, towels, or bedding with other people in your home. After using these items, they should be washed thoroughly with soap and water.         Precautions continued:    • Clean all \"high-touch\" surfaces every day high touch surfaces include counters, tabletops, doorknobs, bathroom fixtures, toilets, phones, keyboards, tablets, and bedside tables. Also, clean any surfaces that may have blood, stool, or body fluids on them. Use a household cleaning   spray or wipe, according to the label instructions. Labels contain instructions for safe and effective use of the cleaning product including precautions you should take when applying the product, such as wearing gloves and making sure you have good ventilation during use of the product.   • Clean your hands often. Wash your hands often with soap and water for at least 20 seconds. If soap and water are not available, clean your hands with an alcohol-based hand  that contains at least 60% alcohol, covering all surfaces of your hands and rubbing them together until they feel dry. Soap and water should be used preferentially if hands are visibly dirty. Avoid touching your eyes, nose, and mouth with unwashed hands.   • Cover your coughs and sneezes    • Cover your mouth and nose with a tissue when you cough or sneeze   • Throw used " tissues in a lined trash can; immediately wash your hands with soap and water for at least 20 seconds or clean your hands with an alcohol-based hand  that contains at least 60 to 95% alcohol, covering all surfaces of your hands and rubbing them together until they feel dry. Soap and water should be used preferentially if hands are visibly dirty.     • When seeking care at a healthcare facility:    · Seek prompt medical attention if your illness is worsening (e.g., difficulty breathing).    · Put on a facemask before you enter the facility.    · These steps will help the healthcare provider's office to keep other people in the office or waiting room from getting infected or exposed.    · If possible, put on a facemask before emergency medical services arrive.      Please see the resources below for more information.      CDC Corona Website https://www.cdc.gov/coronavirus/2019-ncov/index.html    General Information https://www.cdc.gov/coronavirus/2019-ncov/faq.html      [facility]: [phone]     [facility] Line [phone]

## 2020-07-17 NOTE — LETTER
July 17, 2020    To Whom It May Concern:         This is confirmation that Sanjeev Malagon attended his scheduled appointment with Jong Guillen P.A.-C. on 7/17/20.  Your employee was seen in our clinic today.  A concern for COVID-19 has been identified and testing is in progress.     We are asking you to excuse absences while following self-isolation protocol per Center for Disease Control (CDC) guidelines.  Your employee will be able to access test results through our electronic delivery system called [employer].     If the results of testing are negative, and once there has been no fever (temperature >100.4 F) for at least 72 hours without treatment, and no vomiting or diarrhea for at least 48 hours, then return to work is approved.    If the results of testing are positive then your employee will be contacted by the [facility] or [facility] for further instructions on duration of self-isolation and return to work protocol. In general, this will also follow the CDC guidelines with a minimum of 10 days from the onset of symptoms and without fever, vomiting, or diarrhea as above.     In general, repeat testing is not necessary and not offered through our [facility].     This is the only note that will be provided from [facility] for this visit.  Your employee will require an appointment with a primary care provider if FMLA or disability forms are required.         If you have any questions please do not hesitate to call me at the phone number listed below.    Sincerely,          Jong Guillen P.A.-C.  [phone]

## 2020-07-17 NOTE — PROGRESS NOTES
"Subjective:   Sanjeev Malagon is a 55 y.o. male who presents for Cough (back pain, sore throat x 2 days ago- exposed with COVID )      This is a 55-year-old male who is status post aortic valve repair presenting to urgent care complaining of a mild cough, and sore throat which is progressing over the last 2 days.  He feels mild malaise.  His son who lives at home had symptoms over the last week and subsequently tested positive for COVID-19 and was sent to a friend's house for the last several days however the father has had any symptoms as well as his daughter with similar symptoms.  He is concerned about Covid-19.  He feels a mild subjective fever and chills and general malaise and some mild myalgias as well as a very mild pharyngitis.  He does not have any difficulty swallowing denies any abdominal pain although he has had some mild queasy sensations as well as some looser stools.  He has not noticed a rash.  He has not stopped or started any medications recently.      Review of Systems   Constitutional: Positive for fever and malaise/fatigue. Negative for chills.   HENT: Positive for sore throat. Negative for congestion and ear pain.    Eyes: Negative for pain.   Respiratory: Positive for cough. Negative for sputum production and shortness of breath.    Cardiovascular: Negative for chest pain and palpitations.   Gastrointestinal: Positive for diarrhea. Negative for abdominal pain, constipation, nausea and vomiting.   Genitourinary: Negative for dysuria.   Musculoskeletal: Positive for myalgias.   Skin: Negative for rash.   Neurological: Negative for dizziness and headaches.       Medications, Allergies, and current problem list reviewed today in Epic.     Objective:     /80 (BP Location: Left arm, Patient Position: Sitting, BP Cuff Size: Adult)   Pulse 63   Temp 36.4 °C (97.5 °F) (Temporal)   Resp 12   Ht 1.753 m (5' 9\")   Wt 90.3 kg (199 lb)   SpO2 97%     Physical Exam  Vitals signs " reviewed.   Constitutional:       General: He is not in acute distress.     Appearance: Normal appearance. He is not ill-appearing.   HENT:      Head: Normocephalic and atraumatic.      Right Ear: External ear normal.      Left Ear: External ear normal.      Nose: Nose normal.      Mouth/Throat:      Mouth: Mucous membranes are moist.      Pharynx: No oropharyngeal exudate or posterior oropharyngeal erythema.   Eyes:      Conjunctiva/sclera: Conjunctivae normal.   Cardiovascular:      Rate and Rhythm: Normal rate and regular rhythm.      Heart sounds: Murmur (Loud S2) present.   Pulmonary:      Effort: Pulmonary effort is normal.   Musculoskeletal:         General: No swelling.      Right lower leg: No edema.      Left lower leg: No edema.   Lymphadenopathy:      Cervical: No cervical adenopathy.   Skin:     General: Skin is warm and dry.      Capillary Refill: Capillary refill takes less than 2 seconds.      Coloration: Skin is not pale.   Neurological:      General: No focal deficit present.      Mental Status: He is alert and oriented to person, place, and time.         Assessment/Plan:     Diagnosis and associated orders:     1. Cough  COVID/SARS COV-2 PCR   2. Close exposure to COVID-19 virus  COVID/SARS COV-2 PCR      Comments/MDM:     Patient's vital signs are reassuring and he appears hemodynamically stable and does not require higher level care at this time  I discussed self isolation and provided printed instructions  I discussed ER precautions and provided printed instructions  I discussed returning to clinic for mild symptoms or reevaluation  I educated the patient on possibility of a false-negative test and indications for repeat testing  I instructed the patient to try to follow up with his PCP for further care  I provided the patient the printed AVS which contains information about signing up for MyChart.  If the patient's results come back as NEGATIVE I will notify them via [employer], if the patient's  test results are POSITIVE I WILL CALL THEM.    I provided the patient with a work note to provide to their employer or school regarding returning to work and discontinuation of self isolation.  All questions were answered and patient demonstrated verbal understanding of above.  •          Differential diagnosis, natural history, supportive care, and indications for immediate follow-up discussed.    Advised the patient to follow-up with the primary care physician for recheck, reevaluation, and consideration of further management.    Please note that this dictation was created using voice recognition software. I have made a reasonable attempt to correct obvious errors, but I expect that there are errors of grammar and possibly content that I did not discover before finalizing the note.    This note was electronically signed by Jong Guillen PA-C

## 2020-07-18 LAB
SARS-COV-2 RNA RESP QL NAA+PROBE: NOTDETECTED
SPECIMEN SOURCE: NORMAL

## 2020-07-18 NOTE — RESULT ENCOUNTER NOTE
During the patient's urgent care visit I told them that I will only call them to notify of positive results.  Their result came back as NOT DETECTED so I will release the results and notify them via [employer] as agreed.  They have printed instructions about retesting and self isolation/returning to work.

## 2020-07-21 ENCOUNTER — TELEPHONE (OUTPATIENT)
Dept: CARDIOTHORACIC SURGERY | Facility: MEDICAL CENTER | Age: 55
End: 2020-07-21

## 2020-07-21 NOTE — TELEPHONE ENCOUNTER
Received call from patient that his son whom he lives with tested positive for Covid and was having trouble going through the proper channels.  I called Janett who provided some locations where he can get tested.    Called patient and states that he was tested and negative.  He currently feels just fine; no symptoms and the rest of his family tested negative.  His son is now staying elsewhere.    Call time 4 minutes

## 2020-07-23 ENCOUNTER — TELEPHONE (OUTPATIENT)
Dept: CARDIOLOGY | Facility: MEDICAL CENTER | Age: 55
End: 2020-07-23

## 2020-07-23 ENCOUNTER — NON-PROVIDER VISIT (OUTPATIENT)
Dept: CARDIOLOGY | Facility: MEDICAL CENTER | Age: 55
End: 2020-07-23
Payer: COMMERCIAL

## 2020-07-23 DIAGNOSIS — I48.4 ATYPICAL ATRIAL FLUTTER (HCC): ICD-10-CM

## 2020-07-23 DIAGNOSIS — I49.1 PAC (PREMATURE ATRIAL CONTRACTION): ICD-10-CM

## 2020-07-23 PROCEDURE — 93268 ECG RECORD/REVIEW: CPT | Performed by: INTERNAL MEDICINE

## 2020-07-23 NOTE — TELEPHONE ENCOUNTER
Patient enrolled in the 14 day ePatch program per EVITA Washington.  In-Clinic hookup.    >Currently pending EOS.

## 2020-08-28 ENCOUNTER — TELEPHONE (OUTPATIENT)
Dept: CARDIOLOGY | Facility: MEDICAL CENTER | Age: 55
End: 2020-08-28

## 2020-08-28 NOTE — TELEPHONE ENCOUNTER
Called pt. To advise.       Dr. Chaudhary reviewed monitor results. Per Dr. Chaudhary:  Narrative    Biotel Monitor Summary     Date of monitor recording: July 23, 2020   duration 24 hours and 52 minutes     Findings:   Underlying rhythm is sinus.   Very rare isolated PVCs (2 total)   Very rare isolated PACs (9 total)   No complex ectopy or heart block.     Electronically signed: Miah Chaudhary MD [facility]

## 2020-09-27 DIAGNOSIS — I25.10 CORONARY ARTERY DISEASE INVOLVING NATIVE HEART WITHOUT ANGINA PECTORIS, UNSPECIFIED VESSEL OR LESION TYPE: ICD-10-CM

## 2020-09-29 RX ORDER — ATORVASTATIN CALCIUM 10 MG/1
TABLET, FILM COATED ORAL
Qty: 45 TAB | Refills: 2 | Status: SHIPPED | OUTPATIENT
Start: 2020-09-29 | End: 2022-01-13 | Stop reason: SDUPTHER

## 2020-09-30 DIAGNOSIS — I10 ESSENTIAL HYPERTENSION: ICD-10-CM

## 2020-09-30 DIAGNOSIS — Z95.2 S/P AVR (AORTIC VALVE REPLACEMENT): ICD-10-CM

## 2020-10-05 RX ORDER — METOPROLOL TARTRATE 50 MG/1
TABLET, FILM COATED ORAL
Qty: 180 TAB | Refills: 1 | Status: SHIPPED | OUTPATIENT
Start: 2020-10-05 | End: 2021-04-05

## 2020-12-17 ENCOUNTER — TELEPHONE (OUTPATIENT)
Dept: CARDIOLOGY | Facility: MEDICAL CENTER | Age: 55
End: 2020-12-17

## 2021-01-11 ENCOUNTER — HOSPITAL ENCOUNTER (OUTPATIENT)
Dept: LAB | Facility: MEDICAL CENTER | Age: 56
End: 2021-01-11
Attending: NURSE PRACTITIONER
Payer: COMMERCIAL

## 2021-01-11 DIAGNOSIS — Z95.2 S/P AVR (AORTIC VALVE REPLACEMENT): ICD-10-CM

## 2021-01-11 LAB
ANION GAP SERPL CALC-SCNC: 9 MMOL/L (ref 7–16)
BUN SERPL-MCNC: 15 MG/DL (ref 8–22)
CALCIUM SERPL-MCNC: 9.8 MG/DL (ref 8.5–10.5)
CHLORIDE SERPL-SCNC: 107 MMOL/L (ref 96–112)
CO2 SERPL-SCNC: 22 MMOL/L (ref 20–33)
CREAT SERPL-MCNC: 0.72 MG/DL (ref 0.5–1.4)
GLUCOSE SERPL-MCNC: 96 MG/DL (ref 65–99)
INR PPP: 0.96 (ref 0.87–1.13)
POTASSIUM SERPL-SCNC: 5 MMOL/L (ref 3.6–5.5)
PROTHROMBIN TIME: 13 SEC (ref 12–14.6)
SODIUM SERPL-SCNC: 138 MMOL/L (ref 135–145)

## 2021-01-11 PROCEDURE — 85610 PROTHROMBIN TIME: CPT

## 2021-01-11 PROCEDURE — 80048 BASIC METABOLIC PNL TOTAL CA: CPT

## 2021-01-11 PROCEDURE — 36415 COLL VENOUS BLD VENIPUNCTURE: CPT

## 2021-01-12 ENCOUNTER — TELEPHONE (OUTPATIENT)
Dept: CARDIOLOGY | Facility: MEDICAL CENTER | Age: 56
End: 2021-01-12

## 2021-01-12 NOTE — TELEPHONE ENCOUNTER
----- Message from Miah Chaudhary M.D. sent at 1/12/2021 12:12 PM PST -----  Looking through the chart, not sure why a BMP was drawn but it is normal.

## 2021-01-13 ENCOUNTER — OFFICE VISIT (OUTPATIENT)
Dept: CARDIOLOGY | Facility: MEDICAL CENTER | Age: 56
End: 2021-01-13
Payer: COMMERCIAL

## 2021-01-13 VITALS
HEART RATE: 99 BPM | WEIGHT: 214 LBS | SYSTOLIC BLOOD PRESSURE: 142 MMHG | HEIGHT: 69 IN | BODY MASS INDEX: 31.7 KG/M2 | RESPIRATION RATE: 16 BRPM | OXYGEN SATURATION: 97 % | DIASTOLIC BLOOD PRESSURE: 86 MMHG

## 2021-01-13 DIAGNOSIS — I25.10 CORONARY ARTERY DISEASE INVOLVING NATIVE CORONARY ARTERY OF NATIVE HEART WITHOUT ANGINA PECTORIS: ICD-10-CM

## 2021-01-13 DIAGNOSIS — I10 ESSENTIAL HYPERTENSION: ICD-10-CM

## 2021-01-13 DIAGNOSIS — Z95.2 S/P AVR (AORTIC VALVE REPLACEMENT): ICD-10-CM

## 2021-01-13 DIAGNOSIS — Z86.79 HISTORY OF SBE (SUBACUTE BACTERIAL ENDOCARDITIS): ICD-10-CM

## 2021-01-13 PROBLEM — K02.9 DENTAL CARIES: Status: RESOLVED | Noted: 2020-03-10 | Resolved: 2021-01-13

## 2021-01-13 PROBLEM — I33.0 ACUTE BACTERIAL ENDOCARDITIS: Status: RESOLVED | Noted: 2020-03-10 | Resolved: 2021-01-13

## 2021-01-13 PROCEDURE — 99214 OFFICE O/P EST MOD 30 MIN: CPT | Performed by: INTERNAL MEDICINE

## 2021-01-13 RX ORDER — CEPHALEXIN 500 MG/1
500 CAPSULE ORAL EVERY 8 HOURS
COMMUNITY
Start: 2020-11-05 | End: 2022-01-13

## 2021-01-13 ASSESSMENT — FIBROSIS 4 INDEX: FIB4 SCORE: 0.7

## 2021-01-13 ASSESSMENT — ENCOUNTER SYMPTOMS
NEUROLOGICAL NEGATIVE: 1
RESPIRATORY NEGATIVE: 1
MUSCULOSKELETAL NEGATIVE: 1
GASTROINTESTINAL NEGATIVE: 1
CARDIOVASCULAR NEGATIVE: 1
CONSTITUTIONAL NEGATIVE: 1

## 2021-01-14 NOTE — PROGRESS NOTES
Chief Complaint   Patient presents with   • Hypertension     F/V Dx: S/P AVR (aortic valve replacement)       Subjective:   Sanjeev Malagon is a 55 y.o. 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  Postoperatively had some atrial flutter.  An outpatient monitor revealed no recurrence of atrial fib or flutter.  The patient is back to work and feeling well.  He had total of dental extractions prior to his urgent valve replacement.  He has had no fever, palpitations or dyspnea.    He has history of hypertension and hyperlipidemia.  Preoperative coronary angiography revealed mild nonobstructive disease in the circumflex and RCA.  Past Medical History:   Diagnosis Date   • Hyperlipidemia    • Hypertension      Past Surgical History:   Procedure Laterality Date   • AORTIC VALVE REPLACEMENT N/A 5/7/2020    Procedure: REPLACEMENT, AORTIC VALVE;  Surgeon: Katarina Markham M.D.;  Location: SURGERY [facility];  Service: Cardiothoracic   • BEATRIZ N/A 5/7/2020    Procedure: ECHOCARDIOGRAM, TRANSESOPHAGEAL;  Surgeon: Katarina Markham M.D.;  Location: SURGERY [facility];  Service: Cardiothoracic     Family History   Problem Relation Age of Onset   • Heart Disease Mother    • Stroke Mother    • Diabetes Mother    • No Known Problems Father    • Cancer Sister    • Diabetes Maternal Grandmother      Social History     Socioeconomic History   • Marital status:      Spouse name: Not on file   • Number of children: Not on file   • Years of education: Not on file   • Highest education level: Not on file   Occupational History   • Not on file   Social Needs   • Financial resource strain: Not on file   • Food insecurity     Worry: Not on file     Inability: Not on file   • Transportation needs     Medical: Not on file      Non-medical: Not on file   Tobacco Use   • Smoking status: Never Smoker   • Smokeless tobacco: Never Used   Substance and Sexual Activity   • Alcohol use: Not Currently     Alcohol/week: 1.2 oz     Types: 2 Cans of beer per week     Frequency: Monthly or less     Drinks per session: 1 or 2     Comment: social    • Drug use: Not Currently     Types: Cocaine     Comment: when young   • Sexual activity: Not on file   Lifestyle   • Physical activity     Days per week: Not on file     Minutes per session: Not on file   • Stress: Not on file   Relationships   • Social connections     Talks on phone: Not on file     Gets together: Not on file     Attends Orthodoxy service: Not on file     Active member of club or organization: Not on file     Attends meetings of clubs or organizations: Not on file     Relationship status: Not on file   • Intimate partner violence     Fear of current or ex partner: Not on file     Emotionally abused: Not on file     Physically abused: Not on file     Forced sexual activity: Not on file   Other Topics Concern   • Not on file   Social History Narrative   • Not on file     Allergies   Allergen Reactions   • Morphine Hives     Outpatient Encounter Medications as of 1/13/2021   Medication Sig Dispense Refill   • metoprolol (LOPRESSOR) 50 MG Tab TAKE 1 TABLET BY MOUTH TWICE A  Tab 1   • atorvastatin (LIPITOR) 10 MG Tab TAKE 1/2 TABLET BY MOUTH EVERY DAY (Patient taking differently: 5mg) 45 Tab 2   • amLODIPine (NORVASC) 10 MG Tab TAKE 1 TABLET BY MOUTH EVERY DAY 90 Tab 3   • lisinopril (PRINIVIL) 10 MG Tab Take 10 mg by mouth every day.     • loratadine (CLARITIN) 10 MG Tab Take 10 mg by mouth 1 time daily as needed.     • aspirin EC 81 MG EC tablet Take 1 Tab by mouth every day. 30 Tab 0   • acetaminophen (TYLENOL) 325 MG Tab Take 2 Tabs by mouth every 6 hours as needed for Mild Pain or Fever (Mild Pain; (Pain scale 1-3); Temp greater than 100.5 F). 90 Tab 0   • allopurinol (ZYLOPRIM)  "100 MG Tab Take 200 mg by mouth 2 Times a Day.     • Cholecalciferol (VITAMIN D3 PO) Take 1 Tab by mouth every day.     • cephALEXin (KEFLEX) 500 MG Cap Take 500 mg by mouth every 8 hours.     • amiodarone (CORDARONE) 200 MG Tab Take 1 Tab by mouth every day. (Patient not taking: Reported on 1/13/2021) 90 Tab 3   • famotidine (PEPCID) 20 MG Tab Take 20 mg by mouth 1 time daily as needed.       No facility-administered encounter medications on file as of 1/13/2021.      Review of Systems   Constitutional: Negative.    HENT: Negative.    Respiratory: Negative.    Cardiovascular: Negative.    Gastrointestinal: Negative.    Musculoskeletal: Negative.    Skin: Negative.    Neurological: Negative.    Endo/Heme/Allergies: Negative.         Objective:   /86 (BP Location: Left arm, Patient Position: Sitting, BP Cuff Size: Adult)   Pulse 99   Resp 16   Ht 1.753 m (5' 9\")   Wt 97.1 kg (214 lb)   SpO2 97%   BMI 31.60 kg/m²     Physical Exam   Constitutional: He is oriented to person, place, and time. He appears well-nourished. No distress.   HENT:   Head: Normocephalic and atraumatic.   Eyes: Pupils are equal, round, and reactive to light. No scleral icterus.   Neck: No JVD present. No tracheal deviation present.   Cardiovascular: Normal rate and regular rhythm.   Murmur heard.   Systolic murmur is present with a grade of 2/6.  Pulmonary/Chest: Breath sounds normal. No respiratory distress. He has no wheezes.   Abdominal: Soft. Bowel sounds are normal. He exhibits no distension. There is no abdominal tenderness.   Musculoskeletal:         General: No edema.   Neurological: He is alert and oriented to person, place, and time.   Skin: Skin is warm and dry.   Psychiatric: He has a normal mood and affect.       Assessment:     1. S/P AVR (aortic valve replacement)  EC-ECHOCARDIOGRAM COMPLETE W/O CONT   2. History of SBE (subacute bacterial endocarditis)     3. Essential hypertension     4. Coronary artery disease " involving native coronary artery of native heart without angina pectoris         Medical Decision Making:  Today's Assessment / Status / Plan:   Status post aortic valve replacement: Valve sounds are appropriate.  He is due for baseline transthoracic echo after valve replacement    History of endocarditis of mitral valve.  Repeat echo will be obtained to reassess the valve    Nonobstructive coronary disease: By recent angiography.  Recommend checking lipid profile next visit.    Hypertension: Under excellent control with current medications.    We will have an echocardiogram as baseline to assess his aortic valve and also his mitral valve.  Return in 6 months.  Check lipid profile at that time.

## 2021-02-11 ENCOUNTER — HOSPITAL ENCOUNTER (OUTPATIENT)
Dept: CARDIOLOGY | Facility: MEDICAL CENTER | Age: 56
End: 2021-02-11
Attending: INTERNAL MEDICINE
Payer: COMMERCIAL

## 2021-02-11 DIAGNOSIS — Z95.2 S/P AVR (AORTIC VALVE REPLACEMENT): ICD-10-CM

## 2021-02-11 PROCEDURE — 93306 TTE W/DOPPLER COMPLETE: CPT

## 2021-02-12 LAB
LV EJECT FRACT  99904: 55
LV EJECT FRACT MOD 2C 99903: 27.11
LV EJECT FRACT MOD 4C 99902: 59.65
LV EJECT FRACT MOD BP 99901: 54.4

## 2021-02-12 PROCEDURE — 93306 TTE W/DOPPLER COMPLETE: CPT | Mod: 26 | Performed by: INTERNAL MEDICINE

## 2021-02-16 ENCOUNTER — TELEPHONE (OUTPATIENT)
Dept: CARDIOLOGY | Facility: MEDICAL CENTER | Age: 56
End: 2021-02-16

## 2021-02-16 NOTE — TELEPHONE ENCOUNTER
----- Message from Miah Chaudhary M.D. sent at 2/16/2021  9:26 AM PST -----  The echo looks quite good.  The aortic valve replacement is functioning perfectly.  The mitral valve which was previously infected is working just fine and leaking only minimally.  LV function is normal.  All good news

## 2021-04-05 DIAGNOSIS — I10 ESSENTIAL HYPERTENSION: ICD-10-CM

## 2021-04-05 DIAGNOSIS — Z95.2 S/P AVR (AORTIC VALVE REPLACEMENT): ICD-10-CM

## 2021-04-05 RX ORDER — METOPROLOL TARTRATE 50 MG/1
TABLET, FILM COATED ORAL
Qty: 180 TABLET | Refills: 3 | Status: SHIPPED | OUTPATIENT
Start: 2021-04-05 | End: 2022-01-13 | Stop reason: SDUPTHER

## 2021-06-01 ENCOUNTER — OFFICE VISIT (OUTPATIENT)
Dept: CARDIOLOGY | Facility: MEDICAL CENTER | Age: 56
End: 2021-06-01
Payer: COMMERCIAL

## 2021-06-01 VITALS
RESPIRATION RATE: 12 BRPM | HEART RATE: 92 BPM | WEIGHT: 210 LBS | DIASTOLIC BLOOD PRESSURE: 62 MMHG | BODY MASS INDEX: 31.1 KG/M2 | SYSTOLIC BLOOD PRESSURE: 128 MMHG | OXYGEN SATURATION: 95 % | HEIGHT: 69 IN

## 2021-06-01 DIAGNOSIS — Z95.2 S/P AVR (AORTIC VALVE REPLACEMENT): ICD-10-CM

## 2021-06-01 DIAGNOSIS — I10 ESSENTIAL HYPERTENSION: ICD-10-CM

## 2021-06-01 DIAGNOSIS — Z86.79 HISTORY OF SBE (SUBACUTE BACTERIAL ENDOCARDITIS): ICD-10-CM

## 2021-06-01 PROCEDURE — 99213 OFFICE O/P EST LOW 20 MIN: CPT | Performed by: INTERNAL MEDICINE

## 2021-06-01 ASSESSMENT — ENCOUNTER SYMPTOMS
NEUROLOGICAL NEGATIVE: 1
CARDIOVASCULAR NEGATIVE: 1
RESPIRATORY NEGATIVE: 1
GASTROINTESTINAL NEGATIVE: 1
MUSCULOSKELETAL NEGATIVE: 1
CONSTITUTIONAL NEGATIVE: 1

## 2021-06-01 ASSESSMENT — FIBROSIS 4 INDEX: FIB4 SCORE: 0.72

## 2021-06-01 NOTE — PROGRESS NOTES
Chief Complaint   Patient presents with   • Aortic Stenosis     Nonrheumatic aortic valve stenosis       Subjective:   Sanjeev Malagon is a 56 y.o. male who presents today for follow-up of recent aortic valve replacement and treatment of and endocarditis of the mitral valve.  He is admitted to the hospital was streptococcal endocarditis of mitral valve was found to have severe aortic stenosis with peak and mean gradients of 104 and 59 mmHg respectively.  His endocarditis was successfully treated and he ultimately underwent aortic valve replacement with a 25 mm Inspiris Mederos pericardial valve.  The patient been feeling great without any exertional dyspnea, palpitations or edema.  He is back to work full-time.    Past Medical History:   Diagnosis Date   • Hyperlipidemia    • Hypertension      Past Surgical History:   Procedure Laterality Date   • AORTIC VALVE REPLACEMENT N/A 5/7/2020    Procedure: REPLACEMENT, AORTIC VALVE;  Surgeon: Katarina Markham M.D.;  Location: SURGERY [facility];  Service: Cardiothoracic   • BEATRIZ N/A 5/7/2020    Procedure: ECHOCARDIOGRAM, TRANSESOPHAGEAL;  Surgeon: Katarina Markham M.D.;  Location: SURGERY [facility];  Service: Cardiothoracic     Family History   Problem Relation Age of Onset   • Heart Disease Mother    • Stroke Mother    • Diabetes Mother    • No Known Problems Father    • Cancer Sister    • Diabetes Maternal Grandmother      Social History     Socioeconomic History   • Marital status:      Spouse name: Not on file   • Number of children: Not on file   • Years of education: Not on file   • Highest education level: Not on file   Occupational History   • Not on file   Tobacco Use   • Smoking status: Never Smoker   • Smokeless tobacco: Never Used   Vaping Use   • Vaping Use: Never used   Substance and Sexual Activity   • Alcohol use: Not Currently     Alcohol/week: 1.2 oz     Types: 2 Cans of beer per week     Comment: social    • Drug use: Not  Currently     Types: Cocaine     Comment: when young   • Sexual activity: Not on file   Other Topics Concern   • Not on file   Social History Narrative   • Not on file     Social Determinants of Health     Financial Resource Strain:    • Difficulty of Paying Living Expenses:    Food Insecurity:    • Worried About Running Out of Food in the Last Year:    • Ran Out of Food in the Last Year:    Transportation Needs:    • Lack of Transportation (Medical):    • Lack of Transportation (Non-Medical):    Physical Activity:    • Days of Exercise per Week:    • Minutes of Exercise per Session:    Stress:    • Feeling of Stress :    Social Connections:    • Frequency of Communication with Friends and Family:    • Frequency of Social Gatherings with Friends and Family:    • Attends Alevism Services:    • Active Member of Clubs or Organizations:    • Attends Club or Organization Meetings:    • Marital Status:    Intimate Partner Violence:    • Fear of Current or Ex-Partner:    • Emotionally Abused:    • Physically Abused:    • Sexually Abused:      Allergies   Allergen Reactions   • Morphine Hives     Outpatient Encounter Medications as of 6/1/2021   Medication Sig Dispense Refill   • metoprolol tartrate (LOPRESSOR) 50 MG Tab TAKE 1 TABLET BY MOUTH TWICE A  tablet 3   • cephALEXin (KEFLEX) 500 MG Cap Take 500 mg by mouth every 8 hours.     • atorvastatin (LIPITOR) 10 MG Tab TAKE 1/2 TABLET BY MOUTH EVERY DAY (Patient taking differently: 5mg) 45 Tab 2   • amLODIPine (NORVASC) 10 MG Tab TAKE 1 TABLET BY MOUTH EVERY DAY 90 Tab 3   • amiodarone (CORDARONE) 200 MG Tab Take 1 Tab by mouth every day. 90 Tab 3   • lisinopril (PRINIVIL) 10 MG Tab Take 10 mg by mouth every day.     • loratadine (CLARITIN) 10 MG Tab Take 10 mg by mouth 1 time daily as needed.     • famotidine (PEPCID) 20 MG Tab Take 20 mg by mouth 1 time daily as needed.     • aspirin EC 81 MG EC tablet Take 1 Tab by mouth every day. 30 Tab 0   • acetaminophen  "(TYLENOL) 325 MG Tab Take 2 Tabs by mouth every 6 hours as needed for Mild Pain or Fever (Mild Pain; (Pain scale 1-3); Temp greater than 100.5 F). 90 Tab 0   • allopurinol (ZYLOPRIM) 100 MG Tab Take 200 mg by mouth 2 Times a Day.     • Cholecalciferol (VITAMIN D3 PO) Take 1 Tab by mouth every day.       No facility-administered encounter medications on file as of 6/1/2021.     Review of Systems   Constitutional: Negative.    HENT: Negative.    Respiratory: Negative.    Cardiovascular: Negative.    Gastrointestinal: Negative.    Musculoskeletal: Negative.    Skin: Negative.    Neurological: Negative.    Endo/Heme/Allergies: Negative.         Objective:   /62 (BP Location: Left arm, Patient Position: Sitting, BP Cuff Size: Adult)   Pulse 92   Resp 12   Ht 1.753 m (5' 9\")   Wt 95.3 kg (210 lb)   SpO2 95%   BMI 31.01 kg/m²     Physical Exam   Constitutional: He is oriented to person, place, and time. No distress.   HENT:   Head: Normocephalic and atraumatic.   Eyes: Pupils are equal, round, and reactive to light. No scleral icterus.   Neck: No JVD present. No tracheal deviation present.   Cardiovascular: Normal rate and regular rhythm.   Murmur heard.   Systolic murmur is present with a grade of 1/6.  Pulmonary/Chest: Breath sounds normal. No respiratory distress. He has no wheezes.   Abdominal: Soft. Bowel sounds are normal. He exhibits no distension. There is no abdominal tenderness.   Neurological: He is alert and oriented to person, place, and time.   Skin: Skin is warm and dry.       Assessment:     1. S/P AVR (aortic valve replacement)     2. Essential hypertension     3. History of SBE (subacute bacterial endocarditis)         Medical Decision Making:  Today's Assessment / Status / Plan:   Status post aortic valve replacement: Valve sounds are appropriate.  I discussed the need for obtaining blood culture before he is given any antibiotics for cough, flu etc.    Status post SVN mitral valve: Recent " echo reveals only trace mitral sufficiency.  Warnings regarding fever and reinfection were discussed as noted above.  Return in 6 months.

## 2021-11-30 ENCOUNTER — TELEPHONE (OUTPATIENT)
Dept: CARDIOLOGY | Facility: MEDICAL CENTER | Age: 56
End: 2021-11-30

## 2021-11-30 NOTE — TELEPHONE ENCOUNTER
Called era [phone]  Have no received clearance form from [facility]. Explained in detail the clearance process and provided fax number [phone] for [facility] to fax clearance. Era verbalized understanding and is appreciative of call.

## 2021-11-30 NOTE — TELEPHONE ENCOUNTER
RS    Pt's wife, Era called and states that her  is scheduled for a colonoscopy on Friday 12/3 with [facility] and they are requesting pt holds Rx Asprin, 7 days prior to procedure. Era states pt has not held this medication so far and wanted to check with cardiologist if this is necessary and if pt is okay to proceed with procedure.   Pt was last seen by RS on 6/1/21 and is scheduled to see RO on 1/13/22. Era was not sure if a clearance is needed. Please call back to further discuss at [phone].     Thank you

## 2022-01-13 ENCOUNTER — TELEMEDICINE (OUTPATIENT)
Dept: CARDIOLOGY | Facility: MEDICAL CENTER | Age: 57
End: 2022-01-13
Payer: COMMERCIAL

## 2022-01-13 VITALS — HEIGHT: 69 IN | WEIGHT: 212 LBS | BODY MASS INDEX: 31.4 KG/M2

## 2022-01-13 DIAGNOSIS — I10 PRIMARY HYPERTENSION: ICD-10-CM

## 2022-01-13 DIAGNOSIS — I25.10 CORONARY ARTERY DISEASE INVOLVING NATIVE CORONARY ARTERY OF NATIVE HEART WITHOUT ANGINA PECTORIS: ICD-10-CM

## 2022-01-13 DIAGNOSIS — Z86.79 HISTORY OF SBE (SUBACUTE BACTERIAL ENDOCARDITIS): ICD-10-CM

## 2022-01-13 DIAGNOSIS — Z95.2 S/P AVR (AORTIC VALVE REPLACEMENT): ICD-10-CM

## 2022-01-13 DIAGNOSIS — I10 ESSENTIAL HYPERTENSION: ICD-10-CM

## 2022-01-13 DIAGNOSIS — I25.10 CORONARY ARTERY DISEASE INVOLVING NATIVE HEART WITHOUT ANGINA PECTORIS, UNSPECIFIED VESSEL OR LESION TYPE: ICD-10-CM

## 2022-01-13 PROCEDURE — 99214 OFFICE O/P EST MOD 30 MIN: CPT | Mod: 95 | Performed by: INTERNAL MEDICINE

## 2022-01-13 RX ORDER — ATORVASTATIN CALCIUM 10 MG/1
5 TABLET, FILM COATED ORAL
Qty: 45 TABLET | Refills: 3 | Status: SHIPPED | OUTPATIENT
Start: 2022-01-13 | End: 2023-01-05

## 2022-01-13 RX ORDER — POLYETHYLENE GLYCOL-3350 AND ELECTROLYTES 236; 6.74; 5.86; 2.97; 22.74 G/274.31G; G/274.31G; G/274.31G; G/274.31G; G/274.31G
POWDER, FOR SOLUTION ORAL
COMMUNITY
Start: 2021-11-26 | End: 2022-01-13

## 2022-01-13 RX ORDER — METOPROLOL TARTRATE 50 MG/1
50 TABLET, FILM COATED ORAL 2 TIMES DAILY
Qty: 180 TABLET | Refills: 3 | Status: SHIPPED | OUTPATIENT
Start: 2022-01-13 | End: 2023-02-17

## 2022-01-13 RX ORDER — AMLODIPINE BESYLATE 10 MG/1
10 TABLET ORAL
Qty: 90 TABLET | Refills: 3 | Status: SHIPPED | OUTPATIENT
Start: 2022-01-13 | End: 2023-01-05

## 2022-01-13 RX ORDER — LISINOPRIL 10 MG/1
10 TABLET ORAL DAILY
Qty: 90 TABLET | Refills: 3 | Status: SHIPPED | OUTPATIENT
Start: 2022-01-13 | End: 2023-02-08

## 2022-01-13 RX ORDER — AMIODARONE HYDROCHLORIDE 200 MG/1
200 TABLET ORAL DAILY
Qty: 90 TABLET | Refills: 3 | Status: SHIPPED | OUTPATIENT
Start: 2022-01-13 | End: 2022-01-13

## 2022-01-13 ASSESSMENT — ENCOUNTER SYMPTOMS
NEUROLOGICAL NEGATIVE: 1
WHEEZING: 0
SORE THROAT: 0
COUGH: 0
SPUTUM PRODUCTION: 0
DIZZINESS: 0
RESPIRATORY NEGATIVE: 1
CARDIOVASCULAR NEGATIVE: 1
PALPITATIONS: 0
FEVER: 0
STRIDOR: 0
GASTROINTESTINAL NEGATIVE: 1
LOSS OF CONSCIOUSNESS: 0
CHILLS: 0
CLAUDICATION: 0
SHORTNESS OF BREATH: 0
WEAKNESS: 0
HEMOPTYSIS: 0
PND: 0
MUSCULOSKELETAL NEGATIVE: 1
BRUISES/BLEEDS EASILY: 0
CONSTITUTIONAL NEGATIVE: 1
EYES NEGATIVE: 1
ORTHOPNEA: 0

## 2022-01-13 ASSESSMENT — FIBROSIS 4 INDEX: FIB4 SCORE: 0.72

## 2022-01-14 NOTE — PROGRESS NOTES
Chief Complaint   Patient presents with   • Hypertension     F/V DX: S/P AVR (aortic valve replacement)       Subjective     Sanjeev Malagon is a 56 y.o. male who presents today as a follow-up for his aortic stenosis and endocarditis status post aortic valve replacement.  He has had all his teeth pulled.  He did develop a atrial flutter postoperatively.  He was on amiodarone for short period time this is stopped.  Otherwise has been doing well.  He did get diagnosed with COVID on Tuesday but is getting better.    Past Medical History:   Diagnosis Date   • Hyperlipidemia    • Hypertension      Past Surgical History:   Procedure Laterality Date   • AORTIC VALVE REPLACEMENT N/A 5/7/2020    Procedure: REPLACEMENT, AORTIC VALVE;  Surgeon: Katarina Markham M.D.;  [place] [facility] [place];  Service: Cardiothoracic   • BEATRIZ N/A 5/7/2020    Procedure: ECHOCARDIOGRAM, TRANSESOPHAGEAL;  Surgeon: Katarina Markham M.D.;  [place] [facility] [place];  Service: Cardiothoracic     Family History   Problem Relation Age of Onset   • Heart Disease Mother    • Stroke Mother    • Diabetes Mother    • No Known Problems Father    • Cancer Sister    • Diabetes Maternal Grandmother      Social History     Socioeconomic History   • Marital status:      Spouse name: Not on file   • Number of children: Not on file   • Years of education: Not on file   • Highest education level: Not on file   Occupational History   • Not on file   Tobacco Use   • Smoking status: Never Smoker   • Smokeless tobacco: Never Used   Vaping Use   • Vaping Use: Never used   Substance and Sexual Activity   • Alcohol use: Not Currently     Alcohol/week: 1.2 oz     Types: 2 Cans of beer per week     Comment: social    • Drug use: Not Currently     Types: Cocaine     Comment: when young   • Sexual activity: Not on file   Other Topics Concern   • Not on file   Social History Narrative   • Not on file     Social Determinants of Health      Financial Resource Strain:    • Difficulty of Paying Living Expenses: Not on file   Food Insecurity:    • Worried About Running Out of Food in the Last Year: Not on file   • Ran Out of Food in the Last Year: Not on file   Transportation Needs:    • Lack of Transportation (Medical): Not on file   • Lack of Transportation (Non-Medical): Not on file   Physical Activity:    • Days of Exercise per Week: Not on file   • Minutes of Exercise per Session: Not on file   Stress:    • Feeling of Stress : Not on file   Social Connections:    • Frequency of Communication with Friends and Family: Not on file   • Frequency of Social Gatherings with Friends and Family: Not on file   • Attends Alevism Services: Not on file   • Active Member of Clubs or Organizations: Not on file   • Attends Club or Organization Meetings: Not on file   • Marital Status: Not on file   Intimate Partner Violence:    • Fear of Current or Ex-Partner: Not on file   • Emotionally Abused: Not on file   • Physically Abused: Not on file   • Sexually Abused: Not on file   Housing Stability:    • Unable to Pay for Housing in the Last Year: Not on file   • Number of Places Lived in the Last Year: Not on file   • Unstable Housing in the Last Year: Not on file     Allergies   Allergen Reactions   • Morphine Hives     Outpatient Encounter Medications as of 1/13/2022   Medication Sig Dispense Refill   • metoprolol tartrate (LOPRESSOR) 50 MG Tab TAKE 1 TABLET BY MOUTH TWICE A  tablet 3   • atorvastatin (LIPITOR) 10 MG Tab TAKE 1/2 TABLET BY MOUTH EVERY DAY (Patient taking differently: 5mg) 45 Tab 2   • amLODIPine (NORVASC) 10 MG Tab TAKE 1 TABLET BY MOUTH EVERY DAY 90 Tab 3   • amiodarone (CORDARONE) 200 MG Tab Take 1 Tab by mouth every day. 90 Tab 3   • lisinopril (PRINIVIL) 10 MG Tab Take 10 mg by mouth every day.     • loratadine (CLARITIN) 10 MG Tab Take 10 mg by mouth 1 time daily as needed.     • famotidine (PEPCID) 20 MG Tab Take 20 mg by mouth 1  "time daily as needed.     • aspirin EC 81 MG EC tablet Take 1 Tab by mouth every day. 30 Tab 0   • acetaminophen (TYLENOL) 325 MG Tab Take 2 Tabs by mouth every 6 hours as needed for Mild Pain or Fever (Mild Pain; (Pain scale 1-3); Temp greater than 100.5 F). 90 Tab 0   • allopurinol (ZYLOPRIM) 100 MG Tab Take 200 mg by mouth 2 Times a Day.     • Cholecalciferol (VITAMIN D3 PO) Take 1 Tab by mouth every day.     • GAVILYTE-G 236 g Recon Soln  (Patient not taking: Reported on 1/13/2022)     • cephALEXin (KEFLEX) 500 MG Cap Take 500 mg by mouth every 8 hours. (Patient not taking: Reported on 1/13/2022)       No facility-administered encounter medications on file as of 1/13/2022.     Review of Systems   Constitutional: Negative.  Negative for chills, fever and malaise/fatigue.   HENT: Negative.  Negative for sore throat.    Eyes: Negative.    Respiratory: Negative.  Negative for cough, hemoptysis, sputum production, shortness of breath, wheezing and stridor.    Cardiovascular: Negative.  Negative for chest pain, palpitations, orthopnea, claudication, leg swelling and PND.   Gastrointestinal: Negative.    Genitourinary: Negative.    Musculoskeletal: Negative.    Skin: Negative.    Neurological: Negative.  Negative for dizziness, loss of consciousness and weakness.   Endo/Heme/Allergies: Negative.  Does not bruise/bleed easily.   All other systems reviewed and are negative.             Objective     Ht 1.753 m (5' 9\")   Wt 96.2 kg (212 lb)   BMI 31.31 kg/m²     Physical Exam  Nursing note reviewed.   Constitutional:       General: He is not in acute distress.     Appearance: He is well-developed. He is not diaphoretic.   HENT:      Head: Normocephalic and atraumatic.      Right Ear: External ear normal.      Left Ear: External ear normal.      Nose: Nose normal.      Mouth/Throat:      Pharynx: No oropharyngeal exudate.   Eyes:      General: No scleral icterus.        Right eye: No discharge.         Left eye: No " discharge.      Conjunctiva/sclera: Conjunctivae normal.      Pupils: Pupils are equal, round, and reactive to light.   Neck:      Vascular: No JVD.      Trachea: No tracheal deviation.   Cardiovascular:      Rate and Rhythm: Normal rate.   Pulmonary:      Effort: Pulmonary effort is normal. No respiratory distress.      Breath sounds: Normal breath sounds. No stridor.   Abdominal:      General: Bowel sounds are normal.      Palpations: Abdomen is soft.   Musculoskeletal:         General: No tenderness or deformity. Normal range of motion.      Cervical back: Normal range of motion and neck supple.   Skin:     General: Skin is warm and dry.      Coloration: Skin is not pale.      Findings: No erythema or rash.   Neurological:      Mental Status: He is alert and oriented to person, place, and time.      Cranial Nerves: No cranial nerve deficit.      Coordination: Coordination normal.   Psychiatric:         Behavior: Behavior normal.         Thought Content: Thought content normal.         Judgment: Judgment normal.                Assessment & Plan     1. Primary hypertension     2. History of SBE (subacute bacterial endocarditis)     3. S/P AVR (aortic valve replacement)     4. Coronary artery disease involving native coronary artery of native heart without angina pectoris         Medical Decision Making: Today's Assessment/Status/Plan:        56-year-old male with aortic stenosis and endocarditis status post aortic valve replacement.  He is doing well from his aortic valve replacement standpoint.  For his endocarditis he will need endocarditis prophylaxis.  I refilled his medications.  Have canceled the amiodarone.  I will see him back in 1 year.      Start time: 4:35  Stop time: 4:45    This evaluation was conducted via Zoom using secure and encrypted videoconferencing technology. The patient was in a private location in the [facility].    The patient's identity was confirmed and verbal consent was obtained  for this virtual visit.

## 2023-02-17 ENCOUNTER — TELEPHONE (OUTPATIENT)
Dept: CARDIOLOGY | Facility: MEDICAL CENTER | Age: 58
End: 2023-02-17
Payer: COMMERCIAL

## 2023-02-17 NOTE — TELEPHONE ENCOUNTER
SULEIMAN    Caller: Era Oliva (Spouse)    Topic/issue: Patient has till next Thursday before he runs out of the extra three days supply that the pharmacy had given him for metoprolol tartrate (LOPRESSOR) 50 MG Tab. Spouse just made a follow up cardio appointment for 02- and is wondering if it would hurt the patient to miss a dose on Friday since his appointment isn't until 2:20pm.    Callback Number: [phone] (patient's cell number)    Thank you,  -Katie DOW

## 2023-02-24 ENCOUNTER — OFFICE VISIT (OUTPATIENT)
Dept: CARDIOLOGY | Facility: MEDICAL CENTER | Age: 58
End: 2023-02-24
Payer: COMMERCIAL

## 2023-02-24 VITALS
BODY MASS INDEX: 32.58 KG/M2 | HEART RATE: 76 BPM | WEIGHT: 220 LBS | HEIGHT: 69 IN | SYSTOLIC BLOOD PRESSURE: 122 MMHG | DIASTOLIC BLOOD PRESSURE: 64 MMHG | OXYGEN SATURATION: 96 % | RESPIRATION RATE: 16 BRPM

## 2023-02-24 DIAGNOSIS — Z86.79 HISTORY OF SBE (SUBACUTE BACTERIAL ENDOCARDITIS): ICD-10-CM

## 2023-02-24 DIAGNOSIS — I10 ESSENTIAL HYPERTENSION: ICD-10-CM

## 2023-02-24 DIAGNOSIS — E78.5 HYPERLIPIDEMIA, UNSPECIFIED HYPERLIPIDEMIA TYPE: ICD-10-CM

## 2023-02-24 DIAGNOSIS — I25.10 NONOBSTRUCTIVE ATHEROSCLEROSIS OF CORONARY ARTERY: ICD-10-CM

## 2023-02-24 DIAGNOSIS — I25.10 CORONARY ARTERY DISEASE INVOLVING NATIVE CORONARY ARTERY OF NATIVE HEART WITHOUT ANGINA PECTORIS: ICD-10-CM

## 2023-02-24 DIAGNOSIS — Z95.2 S/P AVR (AORTIC VALVE REPLACEMENT): ICD-10-CM

## 2023-02-24 LAB — EKG IMPRESSION: NORMAL

## 2023-02-24 PROCEDURE — 93000 ELECTROCARDIOGRAM COMPLETE: CPT | Performed by: INTERNAL MEDICINE

## 2023-02-24 PROCEDURE — 99214 OFFICE O/P EST MOD 30 MIN: CPT | Performed by: INTERNAL MEDICINE

## 2023-02-24 RX ORDER — ASPIRIN 81 MG/1
81 TABLET ORAL DAILY
Qty: 100 TABLET | Refills: 3 | Status: SHIPPED | OUTPATIENT
Start: 2023-02-24 | End: 2024-03-24

## 2023-02-24 RX ORDER — METOPROLOL TARTRATE 50 MG/1
50 TABLET, FILM COATED ORAL 2 TIMES DAILY
Qty: 180 TABLET | Refills: 4 | Status: SHIPPED | OUTPATIENT
Start: 2023-02-24 | End: 2024-03-24

## 2023-02-24 RX ORDER — AMLODIPINE BESYLATE 10 MG/1
10 TABLET ORAL
Qty: 100 TABLET | Refills: 3 | Status: SHIPPED | OUTPATIENT
Start: 2023-02-24

## 2023-02-24 RX ORDER — LISINOPRIL 10 MG/1
10 TABLET ORAL DAILY
Qty: 100 TABLET | Refills: 3 | Status: SHIPPED | OUTPATIENT
Start: 2023-02-24

## 2023-02-24 RX ORDER — BENZONATATE 200 MG/1
CAPSULE ORAL
COMMUNITY
Start: 2022-12-28 | End: 2023-10-02

## 2023-02-24 RX ORDER — ATORVASTATIN CALCIUM 10 MG/1
10 TABLET, FILM COATED ORAL
Qty: 100 TABLET | Refills: 3 | Status: SHIPPED | OUTPATIENT
Start: 2023-02-24 | End: 2024-03-24

## 2023-02-24 ASSESSMENT — ENCOUNTER SYMPTOMS
NEAR-SYNCOPE: 0
PALPITATIONS: 0
IRREGULAR HEARTBEAT: 0
HEARTBURN: 0
DEPRESSION: 0
DIARRHEA: 0
ABDOMINAL PAIN: 0
COUGH: 0
CONSTIPATION: 0
FEVER: 0
PND: 0
FLANK PAIN: 0
NAUSEA: 0
WEIGHT GAIN: 0
DECREASED APPETITE: 0
VOMITING: 0
CLAUDICATION: 0
DYSPNEA ON EXERTION: 0
BLURRED VISION: 0
ALTERED MENTAL STATUS: 0
SHORTNESS OF BREATH: 0
DIZZINESS: 0
ORTHOPNEA: 0
WEIGHT LOSS: 0
BACK PAIN: 0
SYNCOPE: 0

## 2023-02-24 NOTE — PROGRESS NOTES
Cardiology Note    Chief Complaint   Patient presents with    Aortic Stenosis     F/V Dx: S/P AVR (aortic valve replacement)    Hypertension       History of Present Illness: Sanjeev Malagon is a 57 y.o. male PMH endocarditis following dental extraction setting bicuspid aortic valve, AS s/p SAVR bioprosthetic, HTN, HLD who presents for follow up.    Doing well this visit. No active cardiac complaints. Active without cardiovascular limitations. Compliant with medications and denies adverse effects. States has had all his teeth pulled now and no longer seeing dentist.     5/7/2020 REPLACEMENT, AORTIC VALVE (25 mm Inspiris Mederos pericardial valve)    Review of Systems   Constitutional: Negative for decreased appetite, fever, malaise/fatigue, weight gain and weight loss.   HENT:  Negative for congestion and nosebleeds.    Eyes:  Negative for blurred vision.   Cardiovascular:  Negative for chest pain, claudication, dyspnea on exertion, irregular heartbeat, leg swelling, near-syncope, orthopnea, palpitations, paroxysmal nocturnal dyspnea and syncope.   Respiratory:  Negative for cough and shortness of breath.    Endocrine: Negative for cold intolerance and heat intolerance.   Skin:  Negative for rash.   Musculoskeletal:  Negative for back pain.   Gastrointestinal:  Negative for abdominal pain, constipation, diarrhea, heartburn, melena, nausea and vomiting.   Genitourinary:  Negative for dysuria, flank pain and hematuria.   Neurological:  Negative for dizziness.   Psychiatric/Behavioral:  Negative for altered mental status and depression.        Past Medical History:   Diagnosis Date    Hyperlipidemia     Hypertension          Past Surgical History:   Procedure Laterality Date    AORTIC VALVE REPLACEMENT N/A 5/7/2020    Procedure: REPLACEMENT, AORTIC VALVE;  Surgeon: Katarina Markham M.D.;  Location: SURGERY [facility];  Service: Cardiothoracic    BEATRIZ N/A 5/7/2020    Procedure: ECHOCARDIOGRAM,  TRANSESOPHAGEAL;  Surgeon: Katarina Markham M.D.;  Location: SURGERY [facility];  Service: Cardiothoracic         Current Outpatient Medications   Medication Sig Dispense Refill    lisinopril (PRINIVIL) 10 MG Tab Take 1 Tablet by mouth every day. PLEASE CALL [phone] AND SCHEDULE A FOLLOW UP APPOINTMENT FOR FURTHER REFILLS. THANK YOU! 100 Tablet 3    amLODIPine (NORVASC) 10 MG Tab Take 1 Tablet by mouth every day. 100 Tablet 3    metoprolol tartrate (LOPRESSOR) 50 MG Tab Take 1 Tablet by mouth 2 times a day. 180 Tablet 4    aspirin 81 MG EC tablet Take 1 Tablet by mouth every day. 100 Tablet 3    atorvastatin (LIPITOR) 10 MG Tab Take 1 Tablet by mouth every day. 100 Tablet 3    loratadine (CLARITIN) 10 MG Tab Take 10 mg by mouth 1 time daily as needed.      famotidine (PEPCID) 20 MG Tab Take 20 mg by mouth 1 time daily as needed.      acetaminophen (TYLENOL) 325 MG Tab Take 2 Tabs by mouth every 6 hours as needed for Mild Pain or Fever (Mild Pain; (Pain scale 1-3); Temp greater than 100.5 F). 90 Tab 0    allopurinol (ZYLOPRIM) 100 MG Tab Take 200 mg by mouth 2 Times a Day.      Cholecalciferol (VITAMIN D3 PO) Take 1 Tab by mouth every day.      benzonatate (TESSALON) 200 MG capsule TAKE 1 CAPSULE BY MOUTH THREE TIMES A DAY FOR 30 DAYS       No current facility-administered medications for this visit.         Allergies   Allergen Reactions    Morphine Hives         Family History   Problem Relation Age of Onset    Heart Disease Mother     Stroke Mother     Diabetes Mother     No Known Problems Father     Cancer Sister     Diabetes Maternal Grandmother          Social History     Socioeconomic History    Marital status:      Spouse name: Not on file    Number of children: Not on file    Years of education: Not on file    Highest education level: Not on file   Occupational History    Not on file   Tobacco Use    Smoking status: Never    Smokeless tobacco: Never   Vaping Use    Vaping Use: Never used  "  Substance and Sexual Activity    Alcohol use: Not Currently     Alcohol/week: 1.2 oz     Types: 2 Cans of beer per week     Comment: social     Drug use: Not Currently     Types: Cocaine     Comment: when young    Sexual activity: Not on file   Other Topics Concern    Not on file   Social History Narrative    Not on file     Social Determinants of Health     Financial Resource Strain: Not on file   Food Insecurity: Not on file   Transportation Needs: Not on file   Physical Activity: Not on file   Stress: Not on file   Social Connections: Not on file   Intimate Partner Violence: Not on file   Housing Stability: Not on file         Physical Exam:  Ambulatory Vitals  /64 (BP Location: Left arm, Patient Position: Sitting, BP Cuff Size: Adult)   Pulse 76   Resp 16   Ht 1.753 m (5' 9\")   Wt 99.8 kg (220 lb)   SpO2 96%    BP Readings from Last 4 Encounters:   02/24/23 122/64   06/01/21 128/62   01/13/21 142/86   07/17/20 112/80     Weight/BMI:   Vitals:    02/24/23 1259   BP: 122/64   Weight: 99.8 kg (220 lb)   Height: 1.753 m (5' 9\")    Body mass index is 32.49 kg/m².  Wt Readings from Last 4 Encounters:   02/24/23 99.8 kg (220 lb)   11/19/22 95.7 kg (211 lb)   01/13/22 96.2 kg (212 lb)   06/01/21 95.3 kg (210 lb)       Physical Exam  Constitutional:       General: He is not in acute distress.  HENT:      Head: Normocephalic and atraumatic.   Eyes:      Conjunctiva/sclera: Conjunctivae normal.      Pupils: Pupils are equal, round, and reactive to light.   Neck:      Vascular: No JVD.   Cardiovascular:      Rate and Rhythm: Normal rate and regular rhythm.      Heart sounds: Normal heart sounds. No murmur heard.    No friction rub. No gallop.   Pulmonary:      Effort: Pulmonary effort is normal. No respiratory distress.      Breath sounds: Normal breath sounds. No wheezing or rales.   Chest:      Chest wall: No tenderness.   Abdominal:      General: Bowel sounds are normal. There is no distension.      " Palpations: Abdomen is soft.   Musculoskeletal:      Cervical back: Normal range of motion and neck supple.   Skin:     General: Skin is warm and dry.   Neurological:      Mental Status: He is alert and oriented to person, place, and time.   Psychiatric:         Mood and Affect: Affect normal.         Judgment: Judgment normal.       Lab Data Review:  No results found for: CHOLSTRLTOT, LDL, HDL, TRIGLYCERIDE    Lab Results   Component Value Date/Time    SODIUM 138 01/11/2021 10:07 AM    POTASSIUM 5.0 01/11/2021 10:07 AM    CHLORIDE 107 01/11/2021 10:07 AM    CO2 22 01/11/2021 10:07 AM    GLUCOSE 96 01/11/2021 10:07 AM    BUN 15 01/11/2021 10:07 AM    CREATININE 0.72 01/11/2021 10:07 AM    BUNCREATRAT 23 (H) 06/19/2020 07:52 AM     CrCl cannot be calculated (Patient's most recent lab result is older than the maximum 7 days allowed.).  Lab Results   Component Value Date/Time    ALKPHOSPHAT 85 05/06/2020 11:05 AM    ASTSGOT 26 05/06/2020 11:05 AM    ALTSGPT 30 05/06/2020 11:05 AM    TBILIRUBIN 0.4 05/06/2020 11:05 AM      Lab Results   Component Value Date/Time    WBC 11.1 (H) 05/14/2020 02:36 AM     Lab Results   Component Value Date/Time    HBA1C 5.3 05/06/2020 11:05 AM     No components found for: TROP      Cardiac Imaging and Procedures Review:      BEATRIZ 3/2020  Echocardiography Laboratory  CONCLUSIONS  The aortic valve was heavily thickened with a 5mm x 1cm lesion attached   to the non-coronary cusp.  There are multiple smaller mobile components and associated thickening   of the right and left coronary cusps.  There is likely leaflet perforation of the non-coronary cusp with mild   to moderate aortic insufficiency.  The valve appears trileaflet, however there could have been fusion of   two of the cusps which has now dehisced.  Mild mitral valve leaflet thickening but no evidence of javon   endocarditis.  There is thickening of the aorto-mitral valve curtain without evidence   of javon abscess formation.  Known  severe aortic stenosis from previous TTE.   Normal left ventricular systolic function.  Left ventricular ejection fraction is visually estimated to be 65%.  Normal right ventricular size and systolic function.  No thrombus detected in the left atrial appendage.     Compared to the previous echocardiogram performed on 3/10/2020: the   mitral valve vegetation is no longer visible.     [facility] 3/10/20  FINDINGS:  I. HEMODYNAMICS:               Ao: 113/79 mmHg                II. CORONARY ANGIOGRAPHY:  Left Main: Moderate caliber short bifurcating no CAD.  Left Anterior Descending: Moderate caliber.  Moderate caliber first diagonal.  There is mild to moderate diffuse proximal to mid LAD CAD but no obstructive disease.  Left Circumflex: Large caliber dominant mild nonobstructive CAD.  Right Coronary: Moderate caliber nondominant mild nonobstructive CAD.     COMPLICATIONS: none apparent     CONCLUSIONS:  1.  Nonobstructive CAD.  2.  Normal caliber ascending arch and descending thoracic aorta and normal caliber abdominal aortoiliac system.  3.  Mild to moderate aortic insufficiency.    TTE 2/11/21  CONCLUSIONS  Compared to the images of the prior study done -3/10/20, AVR as been   performed. Ascending aorta is visualized better on current study  Moderate concentric left ventricular hypertrophy.  Normal left ventricular systolic function.  Left ventricular ejection fraction is visually estimated to be 55%.  Known bioprosthetic aortic valve that is functioning normally with   normal transvalvular gradients.  Transvalvular gradients are - Peak: 17 mmHg, Mean:10  mmHg.  No aortic insufficiency.  Ascending aorta is thickened, likely due to surgical repair.  Ascending aorta diameter is 3.3  cm.  Estimated right ventricular systolic pressure  is 30 mmHg.  Trace mitral regurgitation.  Structurally normal mitral valve.  Normal left atrial size.    Medical Decision Making:  Problem List Items Addressed This Visit       Essential  hypertension    Relevant Medications    lisinopril (PRINIVIL) 10 MG Tab    amLODIPine (NORVASC) 10 MG Tab    metoprolol tartrate (LOPRESSOR) 50 MG Tab    atorvastatin (LIPITOR) 10 MG Tab    Other Relevant Orders    EKG (Completed)    History of SBE (subacute bacterial endocarditis)    Relevant Medications    lisinopril (PRINIVIL) 10 MG Tab    S/P AVR (aortic valve replacement)    Relevant Medications    lisinopril (PRINIVIL) 10 MG Tab    metoprolol tartrate (LOPRESSOR) 50 MG Tab    Nonobstructive atherosclerosis of coronary artery    Relevant Medications    lisinopril (PRINIVIL) 10 MG Tab    amLODIPine (NORVASC) 10 MG Tab    metoprolol tartrate (LOPRESSOR) 50 MG Tab    atorvastatin (LIPITOR) 10 MG Tab    Other Relevant Orders    Lipid Profile    Hyperlipidemia    Relevant Medications    lisinopril (PRINIVIL) 10 MG Tab    amLODIPine (NORVASC) 10 MG Tab    metoprolol tartrate (LOPRESSOR) 50 MG Tab    atorvastatin (LIPITOR) 10 MG Tab    Other Relevant Orders    Lipid Profile     Bioprosthetic SAVR - continue aspirin. TTE 10 years from implant or sooner if symptomatic. Antibiotic prophylaxis for high risk procedures. No further teeth, no dentist.    HTN - controlled. Goal 120/80. Continue regimen.    HLD / nonobstructive CAD - continue aspirin. Continue statin. Check lipids. Threshold goal LDL <70.     It was my pleasure to meet with Mr. Malagon.

## 2023-09-30 ENCOUNTER — OFFICE VISIT (OUTPATIENT)
Dept: URGENT CARE | Facility: CLINIC | Age: 58
End: 2023-09-30
Payer: COMMERCIAL

## 2023-09-30 ENCOUNTER — APPOINTMENT (OUTPATIENT)
Dept: RADIOLOGY | Facility: IMAGING CENTER | Age: 58
End: 2023-09-30
Attending: STUDENT IN AN ORGANIZED HEALTH CARE EDUCATION/TRAINING PROGRAM
Payer: COMMERCIAL

## 2023-09-30 VITALS
BODY MASS INDEX: 32.61 KG/M2 | HEIGHT: 69 IN | TEMPERATURE: 97.1 F | DIASTOLIC BLOOD PRESSURE: 70 MMHG | OXYGEN SATURATION: 95 % | SYSTOLIC BLOOD PRESSURE: 138 MMHG | HEART RATE: 92 BPM | RESPIRATION RATE: 18 BRPM | WEIGHT: 220.2 LBS

## 2023-09-30 DIAGNOSIS — S69.91XA INJURY OF FINGER OF RIGHT HAND, INITIAL ENCOUNTER: ICD-10-CM

## 2023-09-30 PROCEDURE — 3078F DIAST BP <80 MM HG: CPT | Performed by: STUDENT IN AN ORGANIZED HEALTH CARE EDUCATION/TRAINING PROGRAM

## 2023-09-30 PROCEDURE — 3075F SYST BP GE 130 - 139MM HG: CPT | Performed by: STUDENT IN AN ORGANIZED HEALTH CARE EDUCATION/TRAINING PROGRAM

## 2023-09-30 PROCEDURE — 90715 TDAP VACCINE 7 YRS/> IM: CPT | Performed by: STUDENT IN AN ORGANIZED HEALTH CARE EDUCATION/TRAINING PROGRAM

## 2023-09-30 PROCEDURE — 99203 OFFICE O/P NEW LOW 30 MIN: CPT | Mod: 25 | Performed by: STUDENT IN AN ORGANIZED HEALTH CARE EDUCATION/TRAINING PROGRAM

## 2023-09-30 PROCEDURE — 90471 IMMUNIZATION ADMIN: CPT | Performed by: STUDENT IN AN ORGANIZED HEALTH CARE EDUCATION/TRAINING PROGRAM

## 2023-09-30 PROCEDURE — 73140 X-RAY EXAM OF FINGER(S): CPT | Mod: TC,RT | Performed by: STUDENT IN AN ORGANIZED HEALTH CARE EDUCATION/TRAINING PROGRAM

## 2023-10-01 NOTE — PROGRESS NOTES
"Subjective     Sanjeev Malagon is a 58 y.o. male who presents with Laceration (Pt has a laceration on (R) index finger, unsure if needle is stuck in finger x today)            Sanjeev is a 58 y.o. male who presents to urgent care after injury today while embroidering the back of a chair for work.  Patient states that embroidering needle punctured his right index finger.  He pulled the finger away from machine and thinks needle exited finger but doesn't know for certain. Patient is concerned needle is still in his finger which prompted urgent care visit.  He has pain to puncture area.  Patient is not UTD on tetanus.        Review of Systems   Musculoskeletal:  Negative for joint pain.   Neurological:  Negative for tingling, sensory change, focal weakness and weakness.   All other systems reviewed and are negative.             Objective     /70 (BP Location: Left arm, Patient Position: Sitting, BP Cuff Size: Large adult)   Pulse 92   Temp 36.2 °C (97.1 °F) (Temporal)   Resp 18   Ht 1.753 m (5' 9\")   Wt 99.9 kg (220 lb 3.2 oz)   SpO2 95%   BMI 32.52 kg/m²      Physical Exam  Vitals reviewed.   HENT:      Head: Normocephalic and atraumatic.      Nose: Nose normal.      Mouth/Throat:      Mouth: Mucous membranes are moist.      Pharynx: Oropharynx is clear.   Eyes:      Extraocular Movements: Extraocular movements intact.      Conjunctiva/sclera: Conjunctivae normal.      Pupils: Pupils are equal, round, and reactive to light.   Cardiovascular:      Rate and Rhythm: Normal rate.   Pulmonary:      Effort: Pulmonary effort is normal.   Musculoskeletal:      Right wrist: Normal.      Right hand: Normal range of motion. Normal strength. Normal sensation. Normal capillary refill. Normal pulse.      Comments: Two linear puncture wounds of right index finger. No active bleeding. TTP. No foreign body visualized.  Neurovascularly intact.  Sensation intact.   Skin:     General: Skin is dry.      Capillary " Refill: Capillary refill takes less than 2 seconds.   Neurological:      Mental Status: He is alert.                 RADIOLOGY RESULTS   DX-FINGER(S) 2+ RIGHT    Result Date: 9/30/2023 9/30/2023 7:54 PM HISTORY/REASON FOR EXAM:  Pain/Deformity Following Trauma; Puncture wound of right index finger (distal) from embroidery needle.  Unknown if embroidery needle is still in finger. TECHNIQUE/EXAM DESCRIPTION AND NUMBER OF VIEWS:  3 views of the RIGHT fingers. COMPARISON: None FINDINGS: There is no visualized fracture, subluxation, or dislocation identified.     1.  No acute traumatic bony injury                       Assessment & Plan        1. Injury of finger of right hand, initial encounter  - DX-FINGER(S) 2+ RIGHT  - Tdap =>8yo IM      Differential diagnoses, supportive care measures (ice, elevation, OTC Tylenol/ibuprofen as needed for pain and indications for immediate follow-up discussed with patient. Pathogenesis of diagnosis discussed including typical length and natural progression.      Instructed to return to urgent care or nearest emergency department if symptoms fail to improve, for any change in condition, further concerns, or new concerning symptoms.    Patient states understanding and agrees with the plan of care and discharge instructions.

## 2023-10-02 ASSESSMENT — ENCOUNTER SYMPTOMS
WEAKNESS: 0
FOCAL WEAKNESS: 0
TINGLING: 0
SENSORY CHANGE: 0

## 2024-03-22 DIAGNOSIS — I10 ESSENTIAL HYPERTENSION: ICD-10-CM

## 2024-03-22 DIAGNOSIS — E78.49 OTHER HYPERLIPIDEMIA: ICD-10-CM

## 2024-03-22 DIAGNOSIS — Z95.2 S/P AVR (AORTIC VALVE REPLACEMENT): ICD-10-CM

## 2024-03-22 NOTE — TELEPHONE ENCOUNTER
Is the patient due for a refill? Yes    Was the patient seen the past year? No    Date of last office visit: 2/24/23    Does the patient have an upcoming appointment?  No   If yes, When?     Provider to refill:VR    Does the patients insurance require a 100 day supply?  No

## 2024-03-24 RX ORDER — ASPIRIN 81 MG/1
81 TABLET, COATED ORAL
Qty: 90 TABLET | Refills: 0 | Status: SHIPPED | OUTPATIENT
Start: 2024-03-24

## 2024-03-24 RX ORDER — ATORVASTATIN CALCIUM 10 MG/1
10 TABLET, FILM COATED ORAL
Qty: 90 TABLET | Refills: 0 | Status: SHIPPED | OUTPATIENT
Start: 2024-03-24

## 2024-03-24 RX ORDER — METOPROLOL TARTRATE 50 MG/1
50 TABLET, FILM COATED ORAL 2 TIMES DAILY
Qty: 180 TABLET | Refills: 0 | Status: SHIPPED | OUTPATIENT
Start: 2024-03-24

## 2024-05-06 DIAGNOSIS — I10 ESSENTIAL HYPERTENSION: ICD-10-CM

## 2024-05-06 NOTE — TELEPHONE ENCOUNTER
Is the patient due for a refill? Yes    Was the patient seen the past year? No    Date of last office visit: 2/24/23    Does the patient have an upcoming appointment?  No    Provider to refill:VR    Does the patients insurance require a 100 day supply?  No

## 2024-05-07 RX ORDER — AMLODIPINE BESYLATE 10 MG/1
10 TABLET ORAL
Qty: 90 TABLET | Refills: 0 | Status: SHIPPED | OUTPATIENT
Start: 2024-05-07

## 2024-05-24 DIAGNOSIS — Z95.2 S/P AVR (AORTIC VALVE REPLACEMENT): ICD-10-CM

## 2024-05-24 DIAGNOSIS — Z86.79 HISTORY OF SBE (SUBACUTE BACTERIAL ENDOCARDITIS): ICD-10-CM

## 2024-05-24 DIAGNOSIS — I25.10 CORONARY ARTERY DISEASE INVOLVING NATIVE CORONARY ARTERY OF NATIVE HEART WITHOUT ANGINA PECTORIS: ICD-10-CM

## 2024-05-24 DIAGNOSIS — I10 ESSENTIAL HYPERTENSION: ICD-10-CM

## 2024-05-28 RX ORDER — LISINOPRIL 10 MG/1
10 TABLET ORAL DAILY
Qty: 90 TABLET | Refills: 0 | Status: SHIPPED | OUTPATIENT
Start: 2024-05-28

## 2024-06-16 ENCOUNTER — OFFICE VISIT (OUTPATIENT)
Dept: URGENT CARE | Facility: PHYSICIAN GROUP | Age: 59
End: 2024-06-16
Payer: COMMERCIAL

## 2024-06-16 ENCOUNTER — APPOINTMENT (OUTPATIENT)
Dept: RADIOLOGY | Facility: IMAGING CENTER | Age: 59
End: 2024-06-16
Attending: PHYSICIAN ASSISTANT
Payer: COMMERCIAL

## 2024-06-16 VITALS
WEIGHT: 230.6 LBS | HEIGHT: 69 IN | OXYGEN SATURATION: 97 % | BODY MASS INDEX: 34.16 KG/M2 | SYSTOLIC BLOOD PRESSURE: 148 MMHG | DIASTOLIC BLOOD PRESSURE: 64 MMHG | TEMPERATURE: 100.1 F | RESPIRATION RATE: 12 BRPM | HEART RATE: 99 BPM

## 2024-06-16 DIAGNOSIS — R68.89 FLU-LIKE SYMPTOMS: ICD-10-CM

## 2024-06-16 DIAGNOSIS — R05.1 ACUTE COUGH: ICD-10-CM

## 2024-06-16 LAB
FLUAV RNA SPEC QL NAA+PROBE: NEGATIVE
FLUBV RNA SPEC QL NAA+PROBE: NEGATIVE
RSV RNA SPEC QL NAA+PROBE: NEGATIVE
SARS-COV-2 RNA RESP QL NAA+PROBE: NEGATIVE

## 2024-06-16 PROCEDURE — 0241U POCT CEPHEID COV-2, FLU A/B, RSV - PCR: CPT | Performed by: PHYSICIAN ASSISTANT

## 2024-06-16 PROCEDURE — 99214 OFFICE O/P EST MOD 30 MIN: CPT | Performed by: PHYSICIAN ASSISTANT

## 2024-06-16 PROCEDURE — 3078F DIAST BP <80 MM HG: CPT | Performed by: PHYSICIAN ASSISTANT

## 2024-06-16 PROCEDURE — 71046 X-RAY EXAM CHEST 2 VIEWS: CPT | Mod: TC | Performed by: PHYSICIAN ASSISTANT

## 2024-06-16 PROCEDURE — 3077F SYST BP >= 140 MM HG: CPT | Performed by: PHYSICIAN ASSISTANT

## 2024-06-16 RX ORDER — BENZONATATE 100 MG/1
100 CAPSULE ORAL 3 TIMES DAILY PRN
Qty: 21 CAPSULE | Refills: 0 | Status: SHIPPED | OUTPATIENT
Start: 2024-06-16

## 2024-06-16 RX ORDER — ASPIRIN 81 MG/1
1 TABLET ORAL
COMMUNITY

## 2024-06-16 RX ORDER — MELOXICAM 15 MG/1
TABLET ORAL
COMMUNITY
Start: 2024-06-04

## 2024-06-16 RX ORDER — LOSARTAN POTASSIUM 25 MG/1
TABLET ORAL
COMMUNITY
Start: 2024-06-04

## 2024-06-16 RX ORDER — AMLODIPINE BESYLATE 10 MG/1
1 TABLET ORAL
COMMUNITY

## 2024-06-16 RX ORDER — METOPROLOL TARTRATE 50 MG/1
1 TABLET, FILM COATED ORAL 2 TIMES DAILY
COMMUNITY

## 2024-06-16 NOTE — PROGRESS NOTES
"Subjective:   Sanjeev Malagon is a 59 y.o. male who presents for Cough (Labored breathing, wheezing, bone aches, chest hurts when breathing, x5 days )      HPI  The patient presents to the Urgent Care with complaints of a cough onset 5 days ago.  Gradual onset and his symptoms are gradually worsening.  Reports of cough, wheezing, crackling in lungs, body aches, shortness of breath.  Chest pressure with coughing.  Reports of subjective fever and chills.  Denies any hemoptysis, vomiting, diarrhea. Nonsmoker. No history of asthma.   Denies known history of pneumonia. History of COVID without complications. Received some COVID vaccines.       Past Medical History:   Diagnosis Date    Hyperlipidemia     Hypertension      Allergies   Allergen Reactions    Morphine Hives        Objective:     BP (!) 166/58 (BP Location: Right arm, Patient Position: Sitting, BP Cuff Size: Adult)   Pulse 99   Temp 37.8 °C (100.1 °F) (Temporal)   Resp 12   Ht 1.753 m (5' 9\")   Wt 105 kg (230 lb 9.6 oz)   SpO2 97%   BMI 34.05 kg/m²     Physical Exam  Vitals reviewed.   Constitutional:       General: He is not in acute distress.     Appearance: Normal appearance. He is not ill-appearing or toxic-appearing.   HENT:      Mouth/Throat:      Mouth: Mucous membranes are moist.      Pharynx: Oropharynx is clear. Uvula midline. Posterior oropharyngeal erythema (trace) present. No pharyngeal swelling, oropharyngeal exudate or uvula swelling.      Tonsils: No tonsillar exudate or tonsillar abscesses.   Eyes:      Conjunctiva/sclera: Conjunctivae normal.   Cardiovascular:      Rate and Rhythm: Normal rate and regular rhythm.      Heart sounds: Normal heart sounds.   Pulmonary:      Effort: Pulmonary effort is normal. No respiratory distress.      Breath sounds: Normal breath sounds. No wheezing, rhonchi or rales.   Musculoskeletal:      Cervical back: Neck supple. No rigidity.      Right lower leg: No edema.      Left lower leg: No " edema.   Lymphadenopathy:      Cervical: No cervical adenopathy.   Skin:     General: Skin is warm and dry.   Neurological:      General: No focal deficit present.      Mental Status: He is alert and oriented to person, place, and time.   Psychiatric:         Mood and Affect: Mood normal.         Behavior: Behavior normal.         RADIOLOGY RESULTS   DX-CHEST-2 VIEWS    Result Date: 6/16/2024 6/16/2024 1:59 PM HISTORY/REASON FOR EXAM:  Cough. TECHNIQUE/EXAM DESCRIPTION AND NUMBER OF VIEWS: Two views of the chest. COMPARISON:  Chest radiograph 5/10/2020 FINDINGS: The heart is normal in size. No pulmonary infiltrates or consolidations are noted. No pleural effusions are appreciated. Sternal wires. Cardiac valvuloplasty.     No active disease.         Results for orders placed or performed in visit on 06/16/24   POCT CEPHEID COV-2, FLU A/B, RSV - PCR   Result Value Ref Range    SARS-CoV-2 by PCR Negative Negative, Invalid    Influenza virus A RNA Negative Negative, Invalid    Influenza virus B, PCR Negative Negative, Invalid    RSV, PCR Negative Negative, Invalid       Diagnosis and associated orders:     1. Acute cough  - DX-CHEST-2 VIEWS; Future  - POCT CEPHEID COV-2, FLU A/B, RSV - PCR  - benzonatate (TESSALON) 100 MG Cap; Take 1 Capsule by mouth 3 times a day as needed for Cough.  Dispense: 21 Capsule; Refill: 0    2. Flu-like symptoms  - POCT CEPHEID COV-2, FLU A/B, RSV - PCR       Comments/MDM:     X-ray results per radiologist interpretation above. I personally reviewed images and radiologist report.  The patient's presenting symptoms and exam findings are consistent with a upper respiratory infection most likely viral etiology. They have a normal pulse oximetry on room air, afebrile, and a normal pulmonary exam. Overall, the patient is very well appearing. I do not feel that this patient would benefit from antibiotics at this time.   Recommended symptomatic and supportive care at this time that includes plenty  of fluids, rest, Tylenol/Ibuprofen for pain/fever, warm salt water gargles for sore throat, OTC cough and decongestant medication, Flonase, nasal saline washes. If no improvement in 5-7 days or any worsening symptoms, recommend returning to the urgent care for re-evaluation.          I personally reviewed prior external notes and test results pertinent to today's visit. Pathogenesis of diagnosis discussed including typical length and natural progression. Supportive care, natural history, differential diagnoses, and indications for immediate follow-up discussed. Patient expresses understanding and agrees to plan. Patient denies any other questions or concerns.     Follow-up with the primary care physician for recheck, reevaluation, and consideration of further management.    Please note that this dictation was created using voice recognition software. I have made a reasonable attempt to correct obvious errors, but I expect that there are errors of grammar and possibly content that I did not discover before finalizing the note.    This note was electronically signed by Chuy Laboy PA-C

## 2024-06-16 NOTE — LETTER
June 16, 2024         Patient: Sanjeev Malagon   YOB: 1965   Date of Visit: 6/16/2024           To Whom it May Concern:    Sanjeev Malagon was seen in my clinic on 6/16/2024. Please excuse from work tomorrow.     If you have any questions or concerns, please don't hesitate to call.        Sincerely,           Chuy Laboy P.A.-C.  Electronically Signed

## 2024-07-15 DIAGNOSIS — E78.49 OTHER HYPERLIPIDEMIA: ICD-10-CM

## 2024-07-15 DIAGNOSIS — Z95.2 S/P AVR (AORTIC VALVE REPLACEMENT): ICD-10-CM

## 2024-07-15 DIAGNOSIS — I10 ESSENTIAL HYPERTENSION: ICD-10-CM

## 2024-07-15 RX ORDER — METOPROLOL TARTRATE 50 MG/1
50 TABLET, FILM COATED ORAL 2 TIMES DAILY
Qty: 180 TABLET | Refills: 0 | Status: SHIPPED | OUTPATIENT
Start: 2024-07-15

## 2024-07-22 RX ORDER — AMLODIPINE BESYLATE 10 MG/1
10 TABLET ORAL
Qty: 90 TABLET | Refills: 0 | Status: SHIPPED | OUTPATIENT
Start: 2024-07-22

## 2024-07-22 RX ORDER — ATORVASTATIN CALCIUM 10 MG/1
10 TABLET, FILM COATED ORAL
Qty: 90 TABLET | Refills: 0 | Status: SHIPPED | OUTPATIENT
Start: 2024-07-22

## 2024-07-22 RX ORDER — ASPIRIN 81 MG/1
81 TABLET ORAL
Qty: 90 TABLET | Refills: 0 | Status: SHIPPED | OUTPATIENT
Start: 2024-07-22

## 2024-09-09 ENCOUNTER — OFFICE VISIT (OUTPATIENT)
Dept: URGENT CARE | Facility: PHYSICIAN GROUP | Age: 59
End: 2024-09-09
Payer: COMMERCIAL

## 2024-09-09 VITALS
HEART RATE: 96 BPM | RESPIRATION RATE: 18 BRPM | WEIGHT: 230 LBS | SYSTOLIC BLOOD PRESSURE: 140 MMHG | HEIGHT: 69 IN | BODY MASS INDEX: 34.07 KG/M2 | OXYGEN SATURATION: 93 % | DIASTOLIC BLOOD PRESSURE: 76 MMHG | TEMPERATURE: 96.7 F

## 2024-09-09 DIAGNOSIS — H66.001 NON-RECURRENT ACUTE SUPPURATIVE OTITIS MEDIA OF RIGHT EAR WITHOUT SPONTANEOUS RUPTURE OF TYMPANIC MEMBRANE: ICD-10-CM

## 2024-09-09 DIAGNOSIS — U07.1 COVID-19 VIRUS INFECTION: ICD-10-CM

## 2024-09-09 LAB
FLUAV RNA SPEC QL NAA+PROBE: NEGATIVE
FLUBV RNA SPEC QL NAA+PROBE: NEGATIVE
RSV RNA SPEC QL NAA+PROBE: NEGATIVE
SARS-COV-2 RNA RESP QL NAA+PROBE: POSITIVE

## 2024-09-09 PROCEDURE — 99213 OFFICE O/P EST LOW 20 MIN: CPT | Performed by: PHYSICIAN ASSISTANT

## 2024-09-09 PROCEDURE — 3077F SYST BP >= 140 MM HG: CPT | Performed by: PHYSICIAN ASSISTANT

## 2024-09-09 PROCEDURE — 0241U POCT CEPHEID COV-2, FLU A/B, RSV - PCR: CPT | Performed by: PHYSICIAN ASSISTANT

## 2024-09-09 PROCEDURE — 3078F DIAST BP <80 MM HG: CPT | Performed by: PHYSICIAN ASSISTANT

## 2024-09-09 RX ORDER — METHYLPREDNISOLONE 4 MG
4 TABLET, DOSE PACK ORAL DAILY
Qty: 21 TABLET | Refills: 0 | Status: SHIPPED | OUTPATIENT
Start: 2024-09-09

## 2024-09-09 RX ORDER — DEXTROMETHORPHAN HYDROBROMIDE AND PROMETHAZINE HYDROCHLORIDE 15; 6.25 MG/5ML; MG/5ML
5 SYRUP ORAL EVERY 6 HOURS PRN
Qty: 100 ML | Refills: 0 | Status: SHIPPED | OUTPATIENT
Start: 2024-09-09

## 2024-09-09 ASSESSMENT — ENCOUNTER SYMPTOMS
FEVER: 0
DIAPHORESIS: 0
CHILLS: 0
VOMITING: 0
ABDOMINAL PAIN: 0
SHORTNESS OF BREATH: 1
EYE DISCHARGE: 0
EYE PAIN: 0
NAUSEA: 0
SINUS PAIN: 1
EYE REDNESS: 0
WHEEZING: 0
DIARRHEA: 0
SORE THROAT: 0
CONSTIPATION: 0
HEADACHES: 0
DIZZINESS: 0
COUGH: 1

## 2024-09-10 NOTE — PROGRESS NOTES
"  Subjective:     Sanjeev Malagon  is a 59 y.o. male who presents for Congestion (Pressure on face, cough, sob, x4d )       He presents today with sinus congestion, sinus headaches, cough and intermittent episodes of shortness of breath during coughing episodes.  Symptoms ongoing over the last 4 days.  He is also experiencing abdominal pain due to the severe coughing episodes.  Notes recent close contact of his wife and daughter who have similar symptoms recently.  He denies any history of asthma.  No fevers, no severe chest pain, no nausea vomit, no abdominal pain, no diarrhea.  Has been using various over-the-counter occasions without relief.       Review of Systems   Constitutional:  Negative for chills, diaphoresis, fever and malaise/fatigue.   HENT:  Positive for congestion and sinus pain. Negative for ear discharge and sore throat.    Eyes:  Negative for pain, discharge and redness.   Respiratory:  Positive for cough and shortness of breath. Negative for wheezing.    Cardiovascular:  Negative for chest pain.   Gastrointestinal:  Negative for abdominal pain, constipation, diarrhea, nausea and vomiting.   Neurological:  Negative for dizziness and headaches.      Allergies   Allergen Reactions    Morphine Hives     Past Medical History:   Diagnosis Date    Hyperlipidemia     Hypertension         Objective:   BP (!) 140/76 (BP Location: Right arm, Patient Position: Sitting, BP Cuff Size: Adult long)   Pulse 96   Temp 35.9 °C (96.7 °F) (Temporal)   Resp 18   Ht 1.753 m (5' 9\")   Wt 104 kg (230 lb)   SpO2 93%   BMI 33.97 kg/m²   Physical Exam  Vitals and nursing note reviewed.   Constitutional:       General: He is not in acute distress.     Appearance: Normal appearance. He is not ill-appearing, toxic-appearing or diaphoretic.   HENT:      Head: Normocephalic.      Right Ear: Tympanic membrane, ear canal and external ear normal. There is no impacted cerumen.      Left Ear: Tympanic membrane, ear " canal and external ear normal. There is no impacted cerumen.      Nose: Congestion present. No rhinorrhea.      Mouth/Throat:      Mouth: Mucous membranes are moist.      Pharynx: No oropharyngeal exudate or posterior oropharyngeal erythema.   Eyes:      General:         Right eye: No discharge.         Left eye: No discharge.      Conjunctiva/sclera: Conjunctivae normal.   Cardiovascular:      Rate and Rhythm: Normal rate and regular rhythm.   Pulmonary:      Effort: Pulmonary effort is normal. No respiratory distress.      Breath sounds: Normal breath sounds. No stridor. No wheezing or rhonchi.   Musculoskeletal:      Cervical back: Neck supple.   Lymphadenopathy:      Cervical: No cervical adenopathy.   Neurological:      General: No focal deficit present.      Mental Status: He is alert and oriented to person, place, and time.   Psychiatric:         Mood and Affect: Mood normal.         Behavior: Behavior normal.         Thought Content: Thought content normal.         Judgment: Judgment normal.             Diagnostic testing:    Cephid COVID/Influenza/RSV -positive COVID, notified via [employer] message    Assessment/Plan:     Encounter Diagnoses   Name Primary?    COVID-19 virus infection     Non-recurrent acute suppurative otitis media of right ear without spontaneous rupture of tympanic membrane           Plan for care for today's complaint includes obtaining viral panel testing today, this was positive for COVID.  Did offer COVID antiviral medication but patient did elect to withhold from this medication after group decision making.  We will proceed with Medrol Dosepak, promethazine for viral URI and cough symptom support.  Started Augmentin for right-sided acute otitis media likely secondary to viral URI/COVID-19 infection.  Encouraged over-the-counter medication for additional symptom support.  Vital signs were stable during today's office visit, patient was overall well-appearing. Continue to monitor  symptoms and return to urgent care or follow-up with primary care provider if symptoms remain ongoing.  Follow-up in the emergency department if symptoms become severe, ER precautions discussed in office today..  Prescription for Medrol Dosepak, Augmentin provided.    See AVS Instructions below for written guidance provided to patient on after-visit management and care in addition to our verbal discussion during the visit.    Please note that this dictation was created using voice recognition software. I have attempted to correct all errors, but there may be sound-alike, spelling, grammar and possibly content errors that I did not discover before finalizing the note.    Lui Blankenship PA-C

## 2024-10-22 DIAGNOSIS — I10 ESSENTIAL HYPERTENSION: ICD-10-CM

## 2024-10-22 DIAGNOSIS — Z95.2 S/P AVR (AORTIC VALVE REPLACEMENT): ICD-10-CM

## 2024-10-23 RX ORDER — METOPROLOL TARTRATE 50 MG
50 TABLET ORAL 2 TIMES DAILY
Qty: 180 TABLET | Refills: 1 | Status: SHIPPED | OUTPATIENT
Start: 2024-10-23

## 2024-10-28 DIAGNOSIS — Z95.2 S/P AVR (AORTIC VALVE REPLACEMENT): ICD-10-CM

## 2024-10-28 RX ORDER — SALICYLIC ACID 40 %
81 ADHESIVE PATCH, MEDICATED TOPICAL
Qty: 90 TABLET | Refills: 3 | Status: SHIPPED | OUTPATIENT
Start: 2024-10-28

## 2024-11-08 ENCOUNTER — TELEPHONE (OUTPATIENT)
Dept: CARDIOLOGY | Facility: MEDICAL CENTER | Age: 59
End: 2024-11-08
Payer: COMMERCIAL

## 2024-11-08 DIAGNOSIS — I10 ESSENTIAL HYPERTENSION: ICD-10-CM

## 2024-11-08 DIAGNOSIS — E78.49 OTHER HYPERLIPIDEMIA: ICD-10-CM

## 2024-11-08 RX ORDER — ATORVASTATIN CALCIUM 10 MG/1
10 TABLET, FILM COATED ORAL
Qty: 90 TABLET | Refills: 0 | Status: SHIPPED | OUTPATIENT
Start: 2024-11-08

## 2024-11-08 RX ORDER — AMLODIPINE BESYLATE 10 MG/1
10 TABLET ORAL
Qty: 90 TABLET | Refills: 0 | Status: SHIPPED | OUTPATIENT
Start: 2024-11-08

## 2024-11-08 NOTE — TELEPHONE ENCOUNTER
VR     Caller: Era Malagon    Topic/issue: Medication refill has been denied due not not being seen in office however patient was told to come back in two years. Patient has been scheduled to see VR in January however he will run out of medication before then.     Medication:   - amLODIPine (NORVASC) 10 MG Tab   - atorvastatin (LIPITOR) 10 MG Tab    Pharmacy: [id number] - [place], [place] - [address] [[id number]]     Callback Number: [phone]    Thank you,  Flavia SCHWARTZ

## 2024-11-08 NOTE — TELEPHONE ENCOUNTER
Per VR note from 2/2023 patient was to return in one year. I will provide refills to get to January appointment. Must keep January appointment for further refills.

## 2024-12-28 DIAGNOSIS — E78.49 OTHER HYPERLIPIDEMIA: ICD-10-CM

## 2024-12-28 DIAGNOSIS — Z86.79 HISTORY OF SBE (SUBACUTE BACTERIAL ENDOCARDITIS): ICD-10-CM

## 2024-12-28 DIAGNOSIS — Z95.2 S/P AVR (AORTIC VALVE REPLACEMENT): ICD-10-CM

## 2024-12-28 DIAGNOSIS — I25.10 CORONARY ARTERY DISEASE INVOLVING NATIVE CORONARY ARTERY OF NATIVE HEART WITHOUT ANGINA PECTORIS: ICD-10-CM

## 2024-12-28 DIAGNOSIS — I10 ESSENTIAL HYPERTENSION: ICD-10-CM

## 2024-12-31 RX ORDER — LISINOPRIL 10 MG/1
10 TABLET ORAL DAILY
Qty: 90 TABLET | Refills: 0 | Status: SHIPPED | OUTPATIENT
Start: 2024-12-31

## 2024-12-31 RX ORDER — ATORVASTATIN CALCIUM 10 MG/1
10 TABLET, FILM COATED ORAL
Qty: 90 TABLET | Refills: 0 | Status: SHIPPED | OUTPATIENT
Start: 2024-12-31

## 2024-12-31 RX ORDER — AMLODIPINE BESYLATE 10 MG/1
10 TABLET ORAL
Qty: 90 TABLET | Refills: 0 | Status: SHIPPED | OUTPATIENT
Start: 2024-12-31

## 2024-12-31 NOTE — TELEPHONE ENCOUNTER
Is the patient due for a refill? Yes    Was the patient seen the past year? NO   Date of last office visit: 02.24.2023    Does the patient have an upcoming appointment?  Yes   If yes, When? 01.07.2025    Provider to refill:VR    Does the patient have skilled nursing Plus and need 100-day supply? (This applies to ALL medications) Patient does not have SCP

## 2025-01-07 ENCOUNTER — OFFICE VISIT (OUTPATIENT)
Dept: CARDIOLOGY | Facility: MEDICAL CENTER | Age: 60
End: 2025-01-07
Attending: INTERNAL MEDICINE
Payer: COMMERCIAL

## 2025-01-07 VITALS
OXYGEN SATURATION: 93 % | HEART RATE: 83 BPM | RESPIRATION RATE: 16 BRPM | WEIGHT: 230 LBS | DIASTOLIC BLOOD PRESSURE: 56 MMHG | BODY MASS INDEX: 34.07 KG/M2 | SYSTOLIC BLOOD PRESSURE: 118 MMHG | HEIGHT: 69 IN

## 2025-01-07 DIAGNOSIS — Z95.2 S/P AVR (AORTIC VALVE REPLACEMENT): ICD-10-CM

## 2025-01-07 DIAGNOSIS — E78.5 HYPERLIPIDEMIA, UNSPECIFIED HYPERLIPIDEMIA TYPE: ICD-10-CM

## 2025-01-07 DIAGNOSIS — I25.10 NONOBSTRUCTIVE ATHEROSCLEROSIS OF CORONARY ARTERY: ICD-10-CM

## 2025-01-07 DIAGNOSIS — I10 ESSENTIAL HYPERTENSION: ICD-10-CM

## 2025-01-07 PROCEDURE — 99213 OFFICE O/P EST LOW 20 MIN: CPT | Performed by: INTERNAL MEDICINE

## 2025-01-07 PROCEDURE — 99214 OFFICE O/P EST MOD 30 MIN: CPT | Performed by: INTERNAL MEDICINE

## 2025-01-07 PROCEDURE — 3078F DIAST BP <80 MM HG: CPT | Performed by: INTERNAL MEDICINE

## 2025-01-07 PROCEDURE — 3074F SYST BP LT 130 MM HG: CPT | Performed by: INTERNAL MEDICINE

## 2025-01-07 ASSESSMENT — ENCOUNTER SYMPTOMS
DYSPNEA ON EXERTION: 0
ORTHOPNEA: 0
ALTERED MENTAL STATUS: 0
COUGH: 0
DIZZINESS: 0
IRREGULAR HEARTBEAT: 0
WEIGHT GAIN: 0
SYNCOPE: 0
CLAUDICATION: 0
PND: 0
DIARRHEA: 0
ABDOMINAL PAIN: 0
PALPITATIONS: 0
CONSTIPATION: 0
NEAR-SYNCOPE: 0
SHORTNESS OF BREATH: 0
VOMITING: 0
FLANK PAIN: 0
DECREASED APPETITE: 0
HEARTBURN: 0
NAUSEA: 0
FEVER: 0
BACK PAIN: 0
DEPRESSION: 0
WEIGHT LOSS: 0
BLURRED VISION: 0

## 2025-01-08 NOTE — PROGRESS NOTES
Cardiology Note    Chief Complaint   Patient presents with    Follow-Up     Essential hypertension      Hyperlipidemia    Other     S/P AVR (aortic valve replacement)           History of Present Illness: Sanjeev Malagon is a 59 y.o. male PMH endocarditis following dental extraction setting bicuspid aortic valve, AS s/p SAVR bioprosthetic, HTN, HLD who presents for follow up.    Continues to do well. No cardiac complaints. Very active at work. No cardiovascular limitations. Compliant with medications and denies adverse effects. Had all his teeth pulled and no longer seeing dentist.     5/7/2020 REPLACEMENT, AORTIC VALVE (25 mm Inspiris Mederos pericardial valve)    Review of Systems   Constitutional: Negative for decreased appetite, fever, malaise/fatigue, weight gain and weight loss.   HENT:  Negative for congestion and nosebleeds.    Eyes:  Negative for blurred vision.   Cardiovascular:  Negative for chest pain, claudication, dyspnea on exertion, irregular heartbeat, leg swelling, near-syncope, orthopnea, palpitations, paroxysmal nocturnal dyspnea and syncope.   Respiratory:  Negative for cough and shortness of breath.    Endocrine: Negative for cold intolerance and heat intolerance.   Skin:  Negative for rash.   Musculoskeletal:  Negative for back pain.   Gastrointestinal:  Negative for abdominal pain, constipation, diarrhea, heartburn, melena, nausea and vomiting.   Genitourinary:  Negative for dysuria, flank pain and hematuria.   Neurological:  Negative for dizziness.   Psychiatric/Behavioral:  Negative for altered mental status and depression.          Past Medical History:   Diagnosis Date    Hyperlipidemia     Hypertension          Past Surgical History:   Procedure Laterality Date    AORTIC VALVE REPLACEMENT N/A 5/7/2020    Procedure: REPLACEMENT, AORTIC VALVE;  Surgeon: Katarina Markham M.D.;  Location: SURGERY [facility];  Service: Cardiothoracic    BEATRIZ N/A 5/7/2020    Procedure:  ECHOCARDIOGRAM, TRANSESOPHAGEAL;  Surgeon: Katarina Markham M.D.;  Location: SURGERY [facility];  Service: Cardiothoracic         Current Outpatient Medications   Medication Sig Dispense Refill    NON SPECIFIED every day. Allergy pill      amLODIPine (NORVASC) 10 MG Tab TAKE 1 TABLET BY MOUTH EVERY DAY 90 Tablet 0    atorvastatin (LIPITOR) 10 MG Tab TAKE 1 TABLET BY MOUTH EVERY DAY 90 Tablet 0    aspirin ([employer] ASPIRIN LOW DOSE) 81 MG EC tablet TAKE 1 TABLET BY MOUTH EVERY DAY 90 Tablet 3    metoprolol tartrate (LOPRESSOR) 50 MG Tab Take 1 Tablet by mouth 2 times a day. 180 Tablet 1    losartan (COZAAR) 25 MG Tab       aspirin 81 MG EC tablet Take 1 Tablet by mouth every day.      Ascorbic Acid (VITAMIN C PO) Take  by mouth.      multivitamin Tab Take 1 Tablet by mouth every day.      acetaminophen (TYLENOL) 325 MG Tab Take 2 Tabs by mouth every 6 hours as needed for Mild Pain or Fever (Mild Pain; (Pain scale 1-3); Temp greater than 100.5 F). 90 Tab 0    allopurinol (ZYLOPRIM) 100 MG Tab Take 200 mg by mouth 2 Times a Day.      Cholecalciferol (VITAMIN D3 PO) Take 1 Tab by mouth every day.      lisinopril (PRINIVIL) 10 MG Tab TAKE 1 TABLET BY MOUTH EVERY DAY. PLEASE CALL [phone] AND SCHEDULE A FOLLOW UP APPOINTMENT FOR FURTHER REFILLS. THANK YOU! (Patient not taking: Reported on 1/7/2025) 90 Tablet 0    methylPREDNISolone (MEDROL DOSEPAK) 4 MG Tablet Therapy Pack Take 1 Tablet by mouth every day. Follow schedule on package instructions. (Patient not taking: Reported on 1/7/2025) 21 Tablet 0    promethazine-dextromethorphan (PROMETHAZINE-DM) 6.25-15 MG/5ML syrup Take 5 mL by mouth every 6 hours as needed for Cough for up to 20 doses. (Patient not taking: Reported on 1/7/2025) 100 mL 0    meloxicam (MOBIC) 15 MG tablet  (Patient not taking: Reported on 1/7/2025)      benzonatate (TESSALON) 100 MG Cap Take 1 Capsule by mouth 3 times a day as needed for Cough. (Patient not taking: Reported on 1/7/2025) 21 Capsule  "0     No current facility-administered medications for this visit.         Allergies   Allergen Reactions    Morphine Hives         Family History   Problem Relation Age of Onset    Heart Disease Mother     Stroke Mother     Diabetes Mother     No Known Problems Father     Cancer Sister     Diabetes Maternal Grandmother          Social History     Socioeconomic History    Marital status:      Spouse name: Not on file    Number of children: Not on file    Years of education: Not on file    Highest education level: Not on file   Occupational History    Not on file   Tobacco Use    Smoking status: Never    Smokeless tobacco: Never   Vaping Use    Vaping status: Never Used   Substance and Sexual Activity    Alcohol use: Not Currently     Alcohol/week: 1.2 oz     Types: 2 Cans of beer per week     Comment: social     Drug use: Not Currently     Types: Cocaine     Comment: when young    Sexual activity: Not on file   Other Topics Concern    Not on file   Social History Narrative    Not on file     Social Drivers of Health     Financial Resource Strain: Not on file   Food Insecurity: Not on file   Transportation Needs: Not on file   Physical Activity: Not on file   Stress: Not on file   Social Connections: Not on file   Intimate Partner Violence: Not on file   Housing Stability: Not on file         Physical Exam:  Ambulatory Vitals  /56 (BP Location: Left arm, Patient Position: Sitting, BP Cuff Size: Adult)   Pulse 83   Resp 16   Ht 1.753 m (5' 9\")   Wt 104 kg (230 lb)   SpO2 93%    BP Readings from Last 4 Encounters:   01/07/25 118/56   09/09/24 (!) 140/76   06/16/24 (!) 148/64   09/30/23 138/70     Weight/BMI:   Vitals:    01/07/25 1633   BP: 118/56   Weight: 104 kg (230 lb)   Height: 1.753 m (5' 9\")    Body mass index is 33.97 kg/m².  Wt Readings from Last 4 Encounters:   01/07/25 104 kg (230 lb)   09/09/24 104 kg (230 lb)   06/16/24 105 kg (230 lb 9.6 oz)   09/30/23 99.9 kg (220 lb 3.2 oz) " "      Physical Exam  Constitutional:       General: He is not in acute distress.  HENT:      Head: Normocephalic and atraumatic.   Eyes:      Conjunctiva/sclera: Conjunctivae normal.      Pupils: Pupils are equal, round, and reactive to light.   Neck:      Vascular: No JVD.   Cardiovascular:      Rate and Rhythm: Normal rate and regular rhythm.      Heart sounds: Normal heart sounds. No murmur heard.     No friction rub. No gallop.   Pulmonary:      Effort: Pulmonary effort is normal. No respiratory distress.      Breath sounds: Normal breath sounds. No wheezing or rales.   Chest:      Chest wall: No tenderness.   Abdominal:      General: Bowel sounds are normal. There is no distension.      Palpations: Abdomen is soft.   Musculoskeletal:      Cervical back: Normal range of motion and neck supple.   Skin:     General: Skin is warm and dry.   Neurological:      Mental Status: He is alert and oriented to person, place, and time.   Psychiatric:         Mood and Affect: Affect normal.         Judgment: Judgment normal.         Lab Data Review:  No results found for: \"CHOLSTRLTOT\", \"LDL\", \"HDL\", \"TRIGLYCERIDE\"    Lab Results   Component Value Date/Time    SODIUM 138 01/11/2021 10:07 AM    POTASSIUM 5.0 01/11/2021 10:07 AM    CHLORIDE 107 01/11/2021 10:07 AM    CO2 22 01/11/2021 10:07 AM    GLUCOSE 96 01/11/2021 10:07 AM    BUN 15 01/11/2021 10:07 AM    CREATININE 0.72 01/11/2021 10:07 AM    BUNCREATRAT 23 (H) 06/19/2020 07:52 AM     CrCl cannot be calculated (Patient's most recent lab result is older than the maximum 7 days allowed.).  Lab Results   Component Value Date/Time    ALKPHOSPHAT 85 05/06/2020 11:05 AM    ASTSGOT 26 05/06/2020 11:05 AM    ALTSGPT 30 05/06/2020 11:05 AM    TBILIRUBIN 0.4 05/06/2020 11:05 AM      Lab Results   Component Value Date/Time    WBC 11.1 (H) 05/14/2020 02:36 AM     Lab Results   Component Value Date/Time    HBA1C 5.3 05/06/2020 11:05 AM     No components found for: \"TROP\"      Cardiac " Imaging and Procedures Review:      BEATRIZ 3/2020  Echocardiography Laboratory  CONCLUSIONS  The aortic valve was heavily thickened with a 5mm x 1cm lesion attached   to the non-coronary cusp.  There are multiple smaller mobile components and associated thickening   of the right and left coronary cusps.  There is likely leaflet perforation of the non-coronary cusp with mild   to moderate aortic insufficiency.  The valve appears trileaflet, however there could have been fusion of   two of the cusps which has now dehisced.  Mild mitral valve leaflet thickening but no evidence of javon   endocarditis.  There is thickening of the aorto-mitral valve curtain without evidence   of javon abscess formation.  Known severe aortic stenosis from previous TTE.   Normal left ventricular systolic function.  Left ventricular ejection fraction is visually estimated to be 65%.  Normal right ventricular size and systolic function.  No thrombus detected in the left atrial appendage.     Compared to the previous echocardiogram performed on 3/10/2020: the   mitral valve vegetation is no longer visible.     [facility] 3/10/20  FINDINGS:  I. HEMODYNAMICS:               Ao: 113/79 mmHg                II. CORONARY ANGIOGRAPHY:  Left Main: Moderate caliber short bifurcating no CAD.  Left Anterior Descending: Moderate caliber.  Moderate caliber first diagonal.  There is mild to moderate diffuse proximal to mid LAD CAD but no obstructive disease.  Left Circumflex: Large caliber dominant mild nonobstructive CAD.  Right Coronary: Moderate caliber nondominant mild nonobstructive CAD.     COMPLICATIONS: none apparent     CONCLUSIONS:  1.  Nonobstructive CAD.  2.  Normal caliber ascending arch and descending thoracic aorta and normal caliber abdominal aortoiliac system.  3.  Mild to moderate aortic insufficiency.    TTE 2/11/21  CONCLUSIONS  Compared to the images of the prior study done -3/10/20, AVR as been   performed. Ascending aorta is visualized better  on current study  Moderate concentric left ventricular hypertrophy.  Normal left ventricular systolic function.  Left ventricular ejection fraction is visually estimated to be 55%.  Known bioprosthetic aortic valve that is functioning normally with   normal transvalvular gradients.  Transvalvular gradients are - Peak: 17 mmHg, Mean:10  mmHg.  No aortic insufficiency.  Ascending aorta is thickened, likely due to surgical repair.  Ascending aorta diameter is 3.3  cm.  Estimated right ventricular systolic pressure  is 30 mmHg.  Trace mitral regurgitation.  Structurally normal mitral valve.  Normal left atrial size.    Medical Decision Making:  Problem List Items Addressed This Visit       Essential hypertension    S/P AVR (aortic valve replacement)    Nonobstructive atherosclerosis of coronary artery    Hyperlipidemia       Bioprosthetic SAVR 5/2020 - continue aspirin. TTE 10 years from implant or sooner if symptomatic. Antibiotic prophylaxis for high risk procedures.     HTN - controlled. Goal <130/80. Continue regimen.    HLD / nonobstructive CAD - continue aspirin. Continue statin. Pending lipids with primary. Threshold goal LDL <70.     It was my pleasure to meet with Mr. Malagon.

## 2025-03-31 DIAGNOSIS — I10 ESSENTIAL HYPERTENSION: ICD-10-CM

## 2025-03-31 DIAGNOSIS — Z95.2 S/P AVR (AORTIC VALVE REPLACEMENT): ICD-10-CM

## 2025-03-31 DIAGNOSIS — I25.10 CORONARY ARTERY DISEASE INVOLVING NATIVE CORONARY ARTERY OF NATIVE HEART WITHOUT ANGINA PECTORIS: ICD-10-CM

## 2025-03-31 DIAGNOSIS — Z86.79 HISTORY OF SBE (SUBACUTE BACTERIAL ENDOCARDITIS): ICD-10-CM

## 2025-03-31 NOTE — TELEPHONE ENCOUNTER
Is the patient due for a refill? Yes    Was the patient seen the last 15 months? Yes    Date of last office visit: 1.7.2025    Does the patient have an upcoming appointment?  No       Provider to refill:VR    Does the patient have [facility] Plus and need 100-day supply? (This applies to ALL medications) Patient does not have SCP

## 2025-04-02 NOTE — TELEPHONE ENCOUNTER
Rx refill for Lisinopril updated to 90 days per protocol, to be sent to preferred pharmacy pending provider's approval.    Per last OV 1/7/25, patient reported not taking med.    To VR.  Kindly review and approve if appropriate.  Thank you very much!

## 2025-04-08 RX ORDER — LISINOPRIL 10 MG/1
10 TABLET ORAL DAILY
Qty: 90 TABLET | Refills: 3 | Status: SHIPPED | OUTPATIENT
Start: 2025-04-08

## 2025-04-10 DIAGNOSIS — Z95.2 S/P AVR (AORTIC VALVE REPLACEMENT): ICD-10-CM

## 2025-04-10 DIAGNOSIS — I10 ESSENTIAL HYPERTENSION: ICD-10-CM

## 2025-04-11 RX ORDER — METOPROLOL TARTRATE 50 MG
50 TABLET ORAL 2 TIMES DAILY
Qty: 180 TABLET | Refills: 3 | Status: SHIPPED | OUTPATIENT
Start: 2025-04-11

## 2025-04-11 NOTE — TELEPHONE ENCOUNTER
Is the patient due for a refill? Yes    Was the patient seen the last 15 months? Yes    Date of last office visit: 01.07.2025    Does the patient have an upcoming appointment?  No   If yes, When? NA    Provider to refill:VR    Does the patient have [facility] Plus and need 100-day supply? (This applies to ALL medications) Patient does not have SCP

## 2025-05-07 DIAGNOSIS — I10 ESSENTIAL HYPERTENSION: ICD-10-CM

## 2025-05-07 RX ORDER — AMLODIPINE BESYLATE 10 MG/1
10 TABLET ORAL
Qty: 90 TABLET | Refills: 3 | Status: SHIPPED | OUTPATIENT
Start: 2025-05-07

## 2025-05-08 DIAGNOSIS — E78.49 OTHER HYPERLIPIDEMIA: ICD-10-CM

## 2025-05-08 NOTE — TELEPHONE ENCOUNTER
Is the patient due for a refill? Yes    Was the patient seen the last 15 months? Yes    Date of last office visit: 01/07/2025    Does the patient have an upcoming appointment?  No       Provider to refill:VR    Does the patient have [facility] Plus and need 100-day supply? (This applies to ALL medications) Patient does not have SCP

## 2025-05-09 RX ORDER — ATORVASTATIN CALCIUM 10 MG/1
10 TABLET, FILM COATED ORAL
Qty: 90 TABLET | Refills: 0 | Status: SHIPPED | OUTPATIENT
Start: 2025-05-09

## 2025-05-09 NOTE — TELEPHONE ENCOUNTER
[facility] refill for Atorvastatin placed and sent to preferred pharmacy. Last OV 1/7/25. Due for annual labs, orders placed.  Reminders sent via [employer].

## 2025-08-03 DIAGNOSIS — E78.49 OTHER HYPERLIPIDEMIA: ICD-10-CM

## 2025-08-04 RX ORDER — ATORVASTATIN CALCIUM 10 MG/1
10 TABLET, FILM COATED ORAL
Qty: 90 TABLET | Refills: 0 | Status: SHIPPED | OUTPATIENT
Start: 2025-08-04

## (undated) DEVICE — CATHETER THERMALDILUTION SWAN - (5EA/CA)

## (undated) DEVICE — SYS DLV COST CLS RM TEMP - INJECTATE (CO-SET II) (10EA/CA)

## (undated) DEVICE — SUTURE NONABSORBABLE ETHIBOND EXCEL 2-0 V-5 2 ARM BRAID GREEN WHITE (6EA/BX)

## (undated) DEVICE — SYSTEM CHEST DRAIN ADULT/PEDS W/AUTO TRANSFUSION CAPABILITY SAHARA (6EA/CA)

## (undated) DEVICE — MASK ANESTHESIA ADULT  - (100/CA)

## (undated) DEVICE — SUTURE 6-0 PROLENE RB-2 D/A 30 (36PK/BX)"

## (undated) DEVICE — SUTURE 2-0 ETHIBOND V-5 30 (12EA/BX)"

## (undated) DEVICE — GLOVE BIOGEL SZ 7.5 SURGICAL PF LTX - (50PR/BX 4BX/CA)

## (undated) DEVICE — TRAY MULTI-LUMEN 7FR PRESSURE W/MAX BARRIER AND BIOPATCH - (5/CA)

## (undated) DEVICE — SENSOR SPO2 NEO LNCS ADHESIVE (20/BX) SEE USER NOTES

## (undated) DEVICE — MICRODRIP PRIMARY VENTED 60 (48EA/CA) THIS WAS PART #2C8428 WHICH WAS DISCONTINUED

## (undated) DEVICE — PACK CV DRAPING/BASIN 2PART - (1/CA)

## (undated) DEVICE — STOPCOCK MALE 4-WAY - (50/CA)

## (undated) DEVICE — GOWN SURGEONS LARGE - (32/CA)

## (undated) DEVICE — BLADE STERNUM SAW SURGICAL 32.0 X 6.4 MM STERILE (1/EA)

## (undated) DEVICE — KIT INTROPERCUTANEOUS SHEATH - 8.5 FR W/MAX BARRIER AND BIOPATCH  (5/CA)

## (undated) DEVICE — SOD. CHL. INJ. 0.9% 1000 ML - (14EA/CA 60CA/PF)

## (undated) DEVICE — TRAY SURESTEP FOLEY TEMP SENSING 16FR (10EA/CA) ORDER  #18764 FOR TEMP FOLEY ONLY

## (undated) DEVICE — SODIUM CHL IRRIGATION 0.9% 1000ML (12EA/CA)

## (undated) DEVICE — TUBING CLEARLINK DUO-VENT - C-FLO (48EA/CA)

## (undated) DEVICE — SET EXTENSION WITH 2 PORTS (48EA/CA) ***PART #2C8610 IS A SUBSTITUTE*****

## (undated) DEVICE — ARMBOARD  SMALL IV 9 INLONG - (25EA/CA)

## (undated) DEVICE — SENSOR CEREBRAL AND SOMATIC MONITORING (20/CA)

## (undated) DEVICE — DEVICE KIT COR-KNOT COMBO2 DEVICES & 12 KNOTS PER KIT (6KT/CA)

## (undated) DEVICE — LACTATED RINGERS INJ 1000 ML - (14EA/CA 60CA/PF)

## (undated) DEVICE — GLOVE BIOGEL INDICATOR SZ 8 SURGICAL PF LTX - (50/BX 4BX/CA)

## (undated) DEVICE — SODIUM CHL. INJ. 0.9% 500ML (24EA/CA 50CA/PF)

## (undated) DEVICE — TRANSDUCER BIFURCATED MONITORING KIT (10EA/CA)

## (undated) DEVICE — ORGANIZER SUTURE GABBAY-FRAT - ER STERILE (3/SET 4ST/BX)

## (undated) DEVICE — WIRE STEEL 5-0 B&S 20 OHS - 5/PK 12PK/BX ITEM. D5329 OR D6625 CAN BE USED AS A SUB

## (undated) DEVICE — TUBE CHEST 32FR. RIGHT ANGLED (10EA/CA)

## (undated) DEVICE — BANDAGE ELASTIC 4 IN X 5 YDS - LATEX FREE(10/BX 5BX/CA)

## (undated) DEVICE — PACK E SUTURE USED FOR - OPEN HEART  (5/BX)

## (undated) DEVICE — SUTURE 4-0 30CM STRATAFIX SPIRAL PS-2 (12EA/BX)

## (undated) DEVICE — GLOVE BIOGEL PI ORTHO SZ 7 PF LF (40PR/BX)

## (undated) DEVICE — SUCTION INSTRUMENT YANKAUER BULBOUS TIP W/O VENT (50EA/CA)

## (undated) DEVICE — BONE WAX (12PK/BX)

## (undated) DEVICE — SUTURE 5-0 PROLENE C-1 D/A 24 (36PK/BX)"

## (undated) DEVICE — KIT ANESTHESIA W/CIRCUIT & 3/LT BAG W/FILTER (20EA/CA)

## (undated) DEVICE — KIT RADIAL ARTERY 20GA W/MAX BARRIER AND BIOPATCH  (5EA/CA) #10740 IS FOR THE SET RADIAL ARTERIAL

## (undated) DEVICE — NEPTUNE 4 PORT MANIFOLD - (20/PK)

## (undated) DEVICE — ADHESIVE DERMABOND HVD MINI (12EA/BX)

## (undated) DEVICE — SLEEVE, VASO, THIGH, MED

## (undated) DEVICE — DERMABOND ADVANCED - (12EA/BX)

## (undated) DEVICE — TUBE CONNECT SUCTION CLEAR 120 X 1/4" (50EA/CA)"

## (undated) DEVICE — SET BIFURCATED BLOOD - (48EA/CS)

## (undated) DEVICE — SET FLUID WARMING STANDARD FLOW - (10/CA)

## (undated) DEVICE — QUICKLOADS COR-KNOT TITANIUM - (6EA/PK 12PK/BX)

## (undated) DEVICE — ELECTRODE DUAL RETURN W/ CORD - (50/PK)

## (undated) DEVICE — SET LEADWIRE 5 LEAD BEDSIDE DISPOSABLE ECG (1SET OF 5/EA)

## (undated) DEVICE — SUTURE 4-0 PROLENE V-7 D/A (36PK/BX)

## (undated) DEVICE — INSERT STEALTH #5 - (10/BX)

## (undated) DEVICE — CANISTER SUCTION 3000ML MECHANICAL FILTER AUTO SHUTOFF MEDI-VAC NONSTERILE LF DISP  (40EA/CA)

## (undated) DEVICE — SUTURE 4-0 PROLENE RB-1 D/A 36 (36PK/BX)"

## (undated) DEVICE — CHLORAPREP 26 ML APPLICATOR - ORANGE TINT(25/CA)

## (undated) DEVICE — BLADE SURGICAL #15 - (50/BX 3BX/CA)

## (undated) DEVICE — LEAD PACING TEMP MYO - (12/BX)

## (undated) DEVICE — BAG RESUSCITATION DISPOSABLE - WITH MASK (10 EA/CA)

## (undated) DEVICE — ELECTRODE RADIOLUCNT SOLID GEL DEFIB PADS (12EA/CA)

## (undated) DEVICE — HEAD HOLDER JUNIOR/ADULT

## (undated) DEVICE — PROTECTOR ULNA NERVE - (36PR/CA)

## (undated) DEVICE — SUTURE OHS

## (undated) DEVICE — GOWN WARMING STANDARD FLEX - (30/CA)

## (undated) DEVICE — SUTURE 5-0 PROLENE RB-1 D/A 36 (36PK/BX)"

## (undated) DEVICE — GOWN SURGICAL XX-LARGE - (28EA/CA) SIRUS NON REINFORCED

## (undated) DEVICE — SUTURE 2-0 ETHIBOND V-5 - (6/BX)

## (undated) DEVICE — SUTURE 2-0 ETHIBOND V-5 (36PK/BX)

## (undated) DEVICE — D-5-W INJ. 500 ML - (24EA/CA)

## (undated) DEVICE — TUBE CHEST 32FR. STRAIGHT - (10EA/CA)

## (undated) DEVICE — FIBRILLAR SURGICEL 4X4 - 10/CA

## (undated) DEVICE — ELECTRODE 850 FOAM ADHESIVE - HYDROGEL RADIOTRNSPRNT (50/PK)